# Patient Record
Sex: FEMALE | Race: WHITE | NOT HISPANIC OR LATINO | Employment: FULL TIME | URBAN - METROPOLITAN AREA
[De-identification: names, ages, dates, MRNs, and addresses within clinical notes are randomized per-mention and may not be internally consistent; named-entity substitution may affect disease eponyms.]

---

## 2017-02-20 ENCOUNTER — GENERIC CONVERSION - ENCOUNTER (OUTPATIENT)
Dept: OTHER | Facility: OTHER | Age: 62
End: 2017-02-20

## 2017-02-23 ENCOUNTER — GENERIC CONVERSION - ENCOUNTER (OUTPATIENT)
Dept: OTHER | Facility: OTHER | Age: 62
End: 2017-02-23

## 2017-03-29 ENCOUNTER — ALLSCRIPTS OFFICE VISIT (OUTPATIENT)
Dept: OTHER | Facility: OTHER | Age: 62
End: 2017-03-29

## 2017-04-05 ENCOUNTER — ALLSCRIPTS OFFICE VISIT (OUTPATIENT)
Dept: OTHER | Facility: OTHER | Age: 62
End: 2017-04-05

## 2017-04-05 DIAGNOSIS — R60.0 LOCALIZED EDEMA: ICD-10-CM

## 2017-06-07 ENCOUNTER — HOSPITAL ENCOUNTER (OUTPATIENT)
Dept: RADIOLOGY | Facility: HOSPITAL | Age: 62
Discharge: HOME | End: 2017-06-07
Payer: COMMERCIAL

## 2017-06-07 ENCOUNTER — GENERIC CONVERSION - ENCOUNTER (OUTPATIENT)
Dept: OTHER | Facility: OTHER | Age: 62
End: 2017-06-07

## 2017-06-07 DIAGNOSIS — R60.0 LOCALIZED EDEMA: ICD-10-CM

## 2017-06-07 LAB
A/G RATIO (HISTORICAL): 1.6 (ref 1.2–2.2)
ALBUMIN SERPL BCP-MCNC: 4.3 G/DL (ref 3.5–5.5)
ALP SERPL-CCNC: 69 IU/L (ref 39–117)
AST SERPL W P-5'-P-CCNC: 15 IU/L (ref 0–40)
BILIRUB SERPL-MCNC: 0.8 MG/DL (ref 0–1.2)
BUN SERPL-MCNC: 10 MG/DL (ref 6–24)
BUN/CREA RATIO (HISTORICAL): 16 (ref 9–23)
CALCIUM SERPL-MCNC: 8.9 MG/DL (ref 8.7–10.2)
CHLORIDE SERPL-SCNC: 103 MMOL/L (ref 96–106)
CO2 SERPL-SCNC: 25 MMOL/L (ref 18–29)
CREAT SERPL-MCNC: 0.64 MG/DL (ref 0.57–1)
EGFR AFRICAN AMERICAN (HISTORICAL): 113 ML/MIN/1.73
EGFR-AMERICAN CALC (HISTORICAL): 98 ML/MIN/1.73
GLUCOSE SERPL-MCNC: 109 MG/DL (ref 65–99)
POTASSIUM SERPL-SCNC: 4.7 MMOL/L (ref 3.5–5.2)
SODIUM SERPL-SCNC: 144 MMOL/L (ref 134–144)
TOT. GLOBULIN, SERUM (HISTORICAL): 2.7 G/DL (ref 1.5–4.5)
TOTAL PROTEIN (HISTORICAL): 7 G/DL (ref 6–8.5)

## 2017-06-07 PROCEDURE — 93970 EXTREMITY STUDY: CPT

## 2017-06-08 ENCOUNTER — GENERIC CONVERSION - ENCOUNTER (OUTPATIENT)
Dept: OTHER | Facility: OTHER | Age: 62
End: 2017-06-08

## 2017-06-08 LAB — TSH SERPL DL<=0.05 MIU/L-ACNC: 1.72 UIU/ML (ref 0.45–4.5)

## 2017-06-22 ENCOUNTER — ALLSCRIPTS OFFICE VISIT (OUTPATIENT)
Dept: OTHER | Facility: OTHER | Age: 62
End: 2017-06-22

## 2017-06-22 LAB — HBA1C MFR BLD HPLC: 5.7 %

## 2017-07-06 ENCOUNTER — GENERIC CONVERSION - ENCOUNTER (OUTPATIENT)
Dept: OTHER | Facility: OTHER | Age: 62
End: 2017-07-06

## 2017-07-06 LAB
BASOPHILS # BLD AUTO: 0 X10E3/UL (ref 0–0.2)
BASOPHILS # BLD AUTO: 1 %
BUN SERPL-MCNC: 14 MG/DL (ref 6–24)
BUN/CREA RATIO (HISTORICAL): 21 (ref 9–23)
CHLORIDE SERPL-SCNC: 104 MMOL/L (ref 96–106)
CHOLEST SERPL-MCNC: 130 MG/DL (ref 100–199)
CO2 SERPL-SCNC: 22 MMOL/L (ref 18–29)
CREAT SERPL-MCNC: 0.66 MG/DL (ref 0.57–1)
DEPRECATED RDW RBC AUTO: 13.9 % (ref 12.3–15.4)
EGFR AFRICAN AMERICAN (HISTORICAL): 112 ML/MIN/1.73
EGFR-AMERICAN CALC (HISTORICAL): 97 ML/MIN/1.73
EOSINOPHIL # BLD AUTO: 0.2 X10E3/UL (ref 0–0.4)
EOSINOPHIL # BLD AUTO: 3 %
GLUCOSE SERPL-MCNC: 123 MG/DL (ref 65–99)
HCT VFR BLD AUTO: 39.7 % (ref 34–46.6)
HDLC SERPL-MCNC: 52 MG/DL
HGB BLD-MCNC: 13 G/DL (ref 11.1–15.9)
IMM.GRANULOCYTES (CD4/8) (HISTORICAL): 0 %
IMM.GRANULOCYTES (CD4/8) (HISTORICAL): 0 X10E3/UL (ref 0–0.1)
LDLC SERPL CALC-MCNC: 62 MG/DL (ref 0–99)
LYMPHOCYTES # BLD AUTO: 2.7 X10E3/UL (ref 0.7–3.1)
LYMPHOCYTES # BLD AUTO: 35 %
MCH RBC QN AUTO: 28.3 PG (ref 26.6–33)
MCHC RBC AUTO-ENTMCNC: 32.7 G/DL (ref 31.5–35.7)
MCV RBC AUTO: 87 FL (ref 79–97)
MONOCYTES # BLD AUTO: 0.5 X10E3/UL (ref 0.1–0.9)
MONOCYTES (HISTORICAL): 6 %
NEUTROPHILS # BLD AUTO: 4.2 X10E3/UL (ref 1.4–7)
NEUTROPHILS # BLD AUTO: 55 %
PLATELET # BLD AUTO: 304 X10E3/UL (ref 150–379)
POTASSIUM SERPL-SCNC: 4.5 MMOL/L (ref 3.5–5.2)
RBC (HISTORICAL): 4.59 X10E6/UL (ref 3.77–5.28)
SODIUM SERPL-SCNC: 143 MMOL/L (ref 134–144)
TRIGL SERPL-MCNC: 81 MG/DL (ref 0–149)
VLDLC SERPL CALC-MCNC: 16 MG/DL (ref 5–40)
WBC # BLD AUTO: 7.7 X10E3/UL (ref 3.4–10.8)

## 2017-07-07 LAB
CREATININE, URINE (HISTORICAL): 116 MG/DL
INTERPRETATION (HISTORICAL): NORMAL
MICROALBUM.,U,RANDOM (HISTORICAL): 32.2 UG/ML
MICROALBUMIN/CREATININE RATIO (HISTORICAL): 27.8 MG/G CREAT (ref 0–30)

## 2017-07-08 LAB
EST. AVERAGE GLUCOSE BLD GHB EST-MCNC: 128 MG/DL
HBA1C MFR BLD HPLC: 6.1 %HB

## 2017-07-11 ENCOUNTER — GENERIC CONVERSION - ENCOUNTER (OUTPATIENT)
Dept: OTHER | Facility: OTHER | Age: 62
End: 2017-07-11

## 2017-07-13 ENCOUNTER — ALLSCRIPTS OFFICE VISIT (OUTPATIENT)
Dept: OTHER | Facility: OTHER | Age: 62
End: 2017-07-13

## 2017-07-13 DIAGNOSIS — Z12.31 ENCOUNTER FOR SCREENING MAMMOGRAM FOR MALIGNANT NEOPLASM OF BREAST: ICD-10-CM

## 2017-07-20 ENCOUNTER — HOSPITAL ENCOUNTER (OUTPATIENT)
Dept: RADIOLOGY | Facility: HOSPITAL | Age: 62
Discharge: HOME | End: 2017-07-20
Payer: COMMERCIAL

## 2017-07-20 DIAGNOSIS — Z12.31 ENCOUNTER FOR SCREENING MAMMOGRAM FOR MALIGNANT NEOPLASM OF BREAST: ICD-10-CM

## 2017-07-20 PROCEDURE — G0202 SCR MAMMO BI INCL CAD: HCPCS

## 2017-07-24 ENCOUNTER — GENERIC CONVERSION - ENCOUNTER (OUTPATIENT)
Dept: OTHER | Facility: OTHER | Age: 62
End: 2017-07-24

## 2017-07-31 ENCOUNTER — GENERIC CONVERSION - ENCOUNTER (OUTPATIENT)
Dept: OTHER | Facility: OTHER | Age: 62
End: 2017-07-31

## 2017-08-09 ENCOUNTER — GENERIC CONVERSION - ENCOUNTER (OUTPATIENT)
Dept: OTHER | Facility: OTHER | Age: 62
End: 2017-08-09

## 2017-08-16 ENCOUNTER — ALLSCRIPTS OFFICE VISIT (OUTPATIENT)
Dept: OTHER | Facility: OTHER | Age: 62
End: 2017-08-16

## 2017-09-11 LAB
BUN SERPL-MCNC: 9 MG/DL (ref 6–24)
BUN/CREA RATIO (HISTORICAL): 13 (ref 9–23)
CHLORIDE SERPL-SCNC: 105 MMOL/L (ref 96–106)
CO2 SERPL-SCNC: 25 MMOL/L (ref 18–29)
CREAT SERPL-MCNC: 0.71 MG/DL (ref 0.57–1)
EGFR AFRICAN AMERICAN (HISTORICAL): 108 ML/MIN/1.73
EGFR-AMERICAN CALC (HISTORICAL): 94 ML/MIN/1.73
GLUCOSE SERPL-MCNC: 121 MG/DL (ref 65–99)
POTASSIUM SERPL-SCNC: 4.7 MMOL/L (ref 3.5–5.2)
SODIUM SERPL-SCNC: 145 MMOL/L (ref 134–144)

## 2017-09-12 ENCOUNTER — GENERIC CONVERSION - ENCOUNTER (OUTPATIENT)
Dept: OTHER | Facility: OTHER | Age: 62
End: 2017-09-12

## 2017-09-20 ENCOUNTER — ALLSCRIPTS OFFICE VISIT (OUTPATIENT)
Dept: OTHER | Facility: OTHER | Age: 62
End: 2017-09-20

## 2017-12-26 LAB
A/G RATIO (HISTORICAL): 1.7 (ref 1.2–2.2)
ALBUMIN SERPL BCP-MCNC: 4.3 G/DL (ref 3.6–4.8)
ALP SERPL-CCNC: 80 IU/L (ref 39–117)
AST SERPL W P-5'-P-CCNC: 16 IU/L (ref 0–40)
BILIRUB SERPL-MCNC: 1 MG/DL (ref 0–1.2)
BUN SERPL-MCNC: 11 MG/DL (ref 8–27)
BUN/CREA RATIO (HISTORICAL): 15 (ref 12–28)
CALCIUM SERPL-MCNC: 9.3 MG/DL (ref 8.7–10.3)
CHLORIDE SERPL-SCNC: 101 MMOL/L (ref 96–106)
CO2 SERPL-SCNC: 27 MMOL/L (ref 18–29)
CREAT SERPL-MCNC: 0.73 MG/DL (ref 0.57–1)
EGFR AFRICAN AMERICAN (HISTORICAL): 104 ML/MIN/1.73
EGFR-AMERICAN CALC (HISTORICAL): 90 ML/MIN/1.73
GLUCOSE SERPL-MCNC: 105 MG/DL (ref 65–99)
POTASSIUM SERPL-SCNC: 5 MMOL/L (ref 3.5–5.2)
SODIUM SERPL-SCNC: 142 MMOL/L (ref 134–144)
TOT. GLOBULIN, SERUM (HISTORICAL): 2.6 G/DL (ref 1.5–4.5)
TOTAL PROTEIN (HISTORICAL): 6.9 G/DL (ref 6–8.5)

## 2017-12-27 ENCOUNTER — GENERIC CONVERSION - ENCOUNTER (OUTPATIENT)
Dept: OTHER | Facility: OTHER | Age: 62
End: 2017-12-27

## 2018-01-09 NOTE — RESULT NOTES
Verified Results  COLONOSCOPY 20Jul2016 12:00AM Maribeth Kwan     Test Name Result Flag Reference   Colonoscopy SEE SCANNED IMAGE        Summary / No summary entered :      No summary entered   Documents attached :      Tobias Najera; Enc: [id number] - Image Encounter -      Maribeth Kwan - (Family Medicine) (Result Document)    Plan  Encounter for screening for malignant neoplasm of colon    · COLONOSCOPY ; every 5 years;  Last [id number]; Next 20Jul2021; Status:Active

## 2018-01-10 NOTE — RESULT NOTES
Verified Results  * XR HIP 2+ VIEW RIGHT [id number] 08:11AM Alisa Lacy Order Number: [id number]     Test Name Result Flag Reference   * XR HIP/PELV 2-3 VWS RIGHT (Report)     RIGHT HIP     INDICATION: Right hip pain  COMPARISON: None     VIEWS: AP pelvis and 2 coned down views of the hip; 3 images     FINDINGS:     There is no acute fracture or dislocation  No degenerative changes  No lytic or blastic lesions are seen  Soft tissues are unremarkable  IMPRESSION:     No acute osseous abnormality  Workstation performed: [id number]     Signed by:   Susan Rodriguez MD   5/16/16     * XR SPINE LUMBAR MINIMUM 4 VIEWS [id number] 08:11AM Alisa Lacy Order Number: [id number]     Test Name Result Flag Reference   XR SPINE LUMBAR MINIMUM 4 VIEWS (Report)     LUMBAR SPINE     INDICATION: Lumbar spine radiculopathy  Left sciatic pain  COMPARISON: None     VIEWS: AP, lateral, bilateral oblique and coned down projections; 5 images     FINDINGS:     There is minimal rotatory levoscoliosis  No subluxation  There is no radiographic evidence of acute fracture or destructive osseous lesion  Mild disc degenerative changes at L3-4  Visualized soft tissues appear unremarkable  IMPRESSION:     Mild degenerative changes at L3-4  Minimal rotatory levoscoliosis         Workstation performed: [id number]     Signed by:   Susan Rodriguez MD   5/16/16

## 2018-01-11 NOTE — RESULT NOTES
Discussion/Summary   NOrmal mammogram  [facility]     Verified Results  * MAMMO SCREENING BILATERAL W CAD [id number] 04:40PM Myriam Patiño Order Number: [id number]    - Patient Instructions: To schedule this appointment, please contact Central Scheduling at [phone]  Do not wear any perfume, powder, lotion or deodorant on breast or underarm area  Please bring your doctors order, referral (if needed) and insurance information with you on the day of the test  Failure to bring this information may result in this test being rescheduled  Arrive 15 minutes prior to your appointment time to register  On the day of your test, please bring any prior mammogram or breast studies with you that were not performed at a [facility]  Failure to bring prior exams may result in your test needing to be rescheduled  Test Name Result Flag Reference   MAMMO SCREENING BILATERAL W CAD (Report)     Patient History:   Patient is postmenopausal    Family history of unknown cancer in mother  Patient's BMI is 36 8  Reason for exam: screening, asymptomatic  Screening     Mammo Screening Bilateral W CAD: July 20, 2017 - Check In #:    [de-identified]   Bilateral CC and MLO view(s) were taken  Technologist: Miguel Ashby   Prior study comparison: May 16, 2016, mammo screening bilateral W   CAD, performed at [address]  [facility]  December 8, 2014, left breast diagnostic mammogram performed at    [place] [address]  November 19, 2014, left    breast screening mammogram performed at [place] [address]  September 5, 2013, bilateral screening mammogram    performed at [place] [address]  Lynette 15, 2012,   bilateral screening mammogram performed at [place] [address]  June 11, 2011, bilateral screening mammogram    performed at [place] [address]   January 15,    2010, bilateral screening mammogram performed at [facility]  There are scattered fibroglandular densities  No dominant soft    tissue mass, architectural distortion or suspicious    calcifications are noted  The skin and nipple contours are    within normal limits  No evidence of malignancy  No significant   change when compared to the prior studies  1  No evidence of malignancy  2  No significant change when compared with the prior study  ACR BI-RADSï¾® Assessments: BiRad:1 - Negative     Recommendation:   Routine screening mammogram of both breasts in 1 year  Analyzed by CAD     The patient is scheduled in a reminder system  8-10% of cancers will be missed on mammography  Management of a    palpable abnormality must be based on clinical grounds  Patients   will be notified of their results via letter from our facility  Accredited by [employer] and FDA       Transcription Location: [facility] 98: [id number]     Risk Value(s):   Tyrer-Cuzick 10 Year: 1 300%, Tyrer-Cuzick Lifetime: 3 500%,    Myriad Table: 1 5%, ELIZABETH 5 Year: 1 1%, NCI Lifetime: 6 2%   Signed by:   Yenifer Damon MD   7/20/17

## 2018-01-11 NOTE — RESULT NOTES
Verified Results  ([address] [facility]) Comp   Metabolic Panel (13) [id number] 08:04AM Alexandria Bruner     Test Name Result Flag Reference   Glucose, Serum 109 mg/dL H 65-99   BUN 10 mg/dL  6-24   Creatinine, Serum 0 64 mg/dL  0 57-1 00   BUN/Creatinine Ratio 16  9-23   Sodium, Serum 144 mmol/L  134-144   Potassium, Serum 4 7 mmol/L  3 5-5 2   Chloride, Serum 103 mmol/L     Carbon Dioxide, Total 25 mmol/L  18-29   Calcium, Serum 8 9 mg/dL  8 7-10 2   Protein, Total, Serum 7 0 g/dL  6 0-8 5   Albumin, Serum 4 3 g/dL  3 5-5 5   Globulin, Total 2 7 g/dL  1 5-4 5   A/G Ratio 1 6  1 2-2 2   Bilirubin, Total 0 8 mg/dL  0 0-1 2   Alkaline Phosphatase, S 69 IU/L     AST (SGOT) 15 IU/L  0-40   eGFR If NonAfricn Am 98 mL/min/1 73  >59   eGFR If Africn Am 113 mL/min/1 73  >59     (LC) TSH Rfx on Abnormal to Free T4 [id number] 08:04AM Alexandria Bruner     Test Name Result Flag Reference   TSH 1 720 uIU/mL  0 450-4 500

## 2018-01-12 VITALS
WEIGHT: 212 LBS | RESPIRATION RATE: 12 BRPM | HEART RATE: 76 BPM | SYSTOLIC BLOOD PRESSURE: 140 MMHG | TEMPERATURE: 97.5 F | DIASTOLIC BLOOD PRESSURE: 80 MMHG | HEIGHT: 61 IN | BODY MASS INDEX: 40.02 KG/M2

## 2018-01-12 VITALS
HEART RATE: 88 BPM | BODY MASS INDEX: 40.4 KG/M2 | TEMPERATURE: 97.8 F | HEIGHT: 61 IN | SYSTOLIC BLOOD PRESSURE: 140 MMHG | DIASTOLIC BLOOD PRESSURE: 80 MMHG | OXYGEN SATURATION: 98 % | WEIGHT: 214 LBS | RESPIRATION RATE: 16 BRPM

## 2018-01-12 NOTE — RESULT NOTES
Verified Results  COLONOSCOPY 20Jul2017 12:00AM Diamond Peterson     Test Name Result Flag Reference   Colonoscopy SEE SCANNED IMAGE        Summary / No summary entered :      No summary entered   Documents attached :      Radha Ortega; Enc: [id number] - Image Encounter -      Diamond Peterson - (Family Medicine) (Result Document)    Plan  Encounter for screening for malignant neoplasm of colon    · COLONOSCOPY ; every 5 years;  Last [id number]; Next [id number]; Status:Active

## 2018-01-12 NOTE — RESULT NOTES
Verified Results  ([address] [facility]) Lipid Panel [id number] 07:55AM [employer]     Test Name Result Flag Reference   Cholesterol, Total 130 mg/dL  100-199   Triglycerides 81 mg/dL  0-149   HDL Cholesterol 52 mg/dL  >39   VLDL Cholesterol Neil 16 mg/dL  5-40   LDL Cholesterol Calc 62 mg/dL  0-99     () Hemoglobin A1c [id number] 07:55AM [employer]     Test Name Result Flag Reference   Hemoglobin A1c 6 1 %Hb     Reference Range:  American Diabetes Association (ADA) Guidelines:  <5 7: Decreased risk for diabetes  5 7 - 6 4: Increased risk for diabetes  >6 4: Ongoing Hyperglycemia of any cause  <7 0: Glycemic control for adults with diabetes   Estimated Average Glucose 128 mg/dL       (1) MICROALBUMIN CREATININE RATIO, RANDOM URINE [id number] 07:55AM [employer]     Test Name Result Flag Reference   Creatinine, Urine 116 0 mg/dL  Not Estab  Microalbumin, Urine 32 2 ug/mL  Not Estab  Microalb/Creat Ratio 27 8 mg/g creat  0 0-30 0     () Basic Metabolic Panel (7) [id number] 07:55AM [employer]     Test Name Result Flag Reference   Glucose, Serum 123 mg/dL H 65-99   BUN 14 mg/dL  6-24   Creatinine, Serum 0 66 mg/dL  0 57-1 00   BUN/Creatinine Ratio 21  9-23   Sodium, Serum 143 mmol/L  134-144   Potassium, Serum 4 5 mmol/L  3 5-5 2   Chloride, Serum 104 mmol/L     Carbon Dioxide, Total 22 mmol/L  18-29   eGFR If NonAfricn Am 97 mL/min/1 73  >59   eGFR If Africn Am 112 mL/min/1 73  >59     [facility]) Cardiovascular Risk Assessment [id number] 07:55AM [employer]     Test Name Result Flag Reference   Interpretation Note     -------------------------------  CARDIOVASCULAR REPORT:  -------------------------------  Current available clinical information suggests the  patient's risk is at least LOW  One major CHD risk factor is  present (age over 54)  If the patient has CHD or a CHD risk  equivalent, the risk category is high   If patient does not  have CHD or a CHD risk equivalent, consider use of the  Pooled Cohort Equations to estimate 10-year CVD risk, as  individuals with greater than 7 5% risk may warrant more  intensive therapy  The calculator can be found at:  http://tools  cardiosource org/RDZDK-Hcgs-Ebpjcwhfh/  -  Insulin resistance, obesity, excessive alcohol use, smoking,  liver disease, and certain medications can cause secondary  dyslipidemia  Consider evaluation if clinically indicated  -  Therapeutic lifestyle changes are always valuable to achieve  optimal blood lipid status (diet, exercise, weight  management)  -------------------------------  LIPID MANAGEMENT  Select one patient risk category based upon medical history  and clinical judgment  Additional risk factors such as  personal or family history of premature CHD, smoking, and  hypertension modify a patient's goals of therapy  In CVD  prevention, the intensity of therapy should be adjusted to  the level of patient risk  MODERATE intensity statin therapy  generally results in an average LDL-C reduction of 30% to  less than 50% from the untreated baseline  Examples include  (daily doses): atorvastatin 10-20 mg, rosuvastatin 5-10 mg,  simvastatin 20-40 mg, pravastatin 40-80 mg, lovastatin 40  mg  HIGH intensity statin therapy generally results in an  average LDL-C reduction of 50% or more from the untreated  baseline  Examples include (daily doses): atorvastatin 40-80  mg and rosuvastatin 20 mg   -------------------------------  LOW RISK ASSESSMENT AND TREATMENT SUGGESTIONS  -------------------------------  LDL-C is optimal, was 111 and now is 62 mg/dL  Non-HDL  Cholesterol is optimal, was 131 and now is 78 mg/dL    -  Considerations for use of statin therapy include family  history of premature atherosclerotic disease, elevated  coronary artery calcium score, ankle-brachial index < 0 9,  elevated CRP, or elevated 10-year or lifetime CVD risk   -------------------------------  INTERMEDIATE RISK ASSESSMENT AND TREATMENT SUGGESTIONS  -------------------------------  LDL-C is optimal, was 111 and now is 62 mg/dL  Non-HDL  Cholesterol is optimal, was 131 and now is 78 mg/dL  -  Consider measurement of LDL particle number or Apo B to  adjudicate need for further LDL lowering therapy  Factors  that may influence statin use include family history of  premature atherosclerotic disease, elevated coronary artery  calcium score, ankle-brachial index < 0 9, elevated CRP, or  elevated 10-year or lifetime CVD risk  If statin cannot be  tolerated or increased, alternatives include use of an  intestinal agent (ezetimibe or bile acid sequestrant) or  niacin   -------------------------------  HIGH RISK ASSESSMENT AND TREATMENT SUGGESTIONS  -------------------------------  LDL-C is optimal, was 111 and now is 62 mg/dL  Non-HDL  Cholesterol is optimal, was 131 and now is 78 mg/dL  -  Continue statin if in use  Consider measurement of LDL  particle number or Apo B to adjudicate need for further LDL  lowering therapy  If statin cannot be tolerated or  increased, alternatives include use of an intestinal agent  (ezetimibe or bile acid sequestrant) or niacin   -------------------------------  DISCLAIMER  These assessments and treatment suggestions are provided as  a convenience in support of the physician-patient  relationship and are not intended to replace the physician's  clinical judgment  They are derived from the national  guidelines in addition to other evidence and expert opinion  The clinician should consider this information within the  context of clinical opinion and the individual patient  SEE GUIDANCE FOR CARDIOVASCULAR REPORT: National Heart,  Lung, and Blood [place]'s Third Report of the NCEP Expert  Panel on Detection, Evaluation and Treatment of High Blood  Cholesterol in Adults (ATP III) (2002  NIH publication  );  Alisson et al  Diabetes Care 2008; 31(4):811-82;  Yobani et al  Clin Chem 2009; 55(3):407-419; Lissy Oropeza et  al  2013 ACC/AHA guideline on the treatment of blood  cholesterol to reduce atherosclerotic cardiovascular risk in  adults: a report of the [employer] of  Cardiology/American Heart Association Task Force on Practice  Guidelines  Circulation 9663;330(DVDJI 2):S1? S45  PDF Image

## 2018-01-12 NOTE — RESULT NOTES
Verified Results  [facility]) Basic Metabolic Panel (7) [id number] 06:39AM Jose Rosas     Test Name Result Flag Reference   Glucose, Serum 121 mg/dL H 65-99   BUN 9 mg/dL  6-24   Creatinine, Serum 0 71 mg/dL  0 57-1 00   BUN/Creatinine Ratio 13  9-23   Sodium, Serum 145 mmol/L H 134-144   Potassium, Serum 4 7 mmol/L  3 5-5 2   Chloride, Serum 105 mmol/L     Carbon Dioxide, Total 25 mmol/L  18-29   eGFR If NonAfricn Am 94 mL/min/1 73  >59   eGFR If Africn Am 108 mL/min/1 73  >59

## 2018-01-13 VITALS
BODY MASS INDEX: 40.22 KG/M2 | SYSTOLIC BLOOD PRESSURE: 160 MMHG | HEIGHT: 61 IN | RESPIRATION RATE: 16 BRPM | DIASTOLIC BLOOD PRESSURE: 90 MMHG | WEIGHT: 213 LBS | TEMPERATURE: 97.6 F | HEART RATE: 88 BPM

## 2018-01-13 VITALS
WEIGHT: 208 LBS | RESPIRATION RATE: 14 BRPM | SYSTOLIC BLOOD PRESSURE: 120 MMHG | HEIGHT: 61 IN | HEART RATE: 84 BPM | BODY MASS INDEX: 39.27 KG/M2 | DIASTOLIC BLOOD PRESSURE: 80 MMHG | TEMPERATURE: 98.1 F

## 2018-01-14 VITALS
HEIGHT: 61 IN | WEIGHT: 209 LBS | SYSTOLIC BLOOD PRESSURE: 112 MMHG | TEMPERATURE: 97.2 F | HEART RATE: 64 BPM | BODY MASS INDEX: 39.46 KG/M2 | RESPIRATION RATE: 12 BRPM | DIASTOLIC BLOOD PRESSURE: 70 MMHG

## 2018-01-14 NOTE — RESULT NOTES
Discussion/Summary   No DVT/deep vein thrombosis/clots on doppler study  [facility]     Verified Results  VAS LOWER LIMB VENOUS DUPLEX STUDY, COMPLETE BILATERAL 07Jun2017 01:57PM Lily Ho Order Number: [id number]    - Patient Instructions: To schedule this appointment, please contact Central Scheduling at [phone]  Test Name Result Flag Reference   VAS LOWER LIMB VENOUS DUPLEX STUDY, COMPLETE BILATERAL (Report)     [facility] REPORT   CLINICAL:   Indications: Localized edema [R60 0]  Patient presents with bilateral lower extremity edema and pain, left > right x   2 months  Patient has Lupus   The patient has no history of CAD, DVT or malignancy  The patient current BMI   is 39 67, Weight is 210 lb and Height is 61 in  FINDINGS:      Segment Right      Left          Impression    Impression       CFV   Normal (Patent) Normal (Patent)             CONCLUSION:   Impression:   RIGHT LOWER LIMB:   No evidence of acute or chronic deep vein thrombosis  No evidence of superficial thrombophlebitis noted  Doppler evaluation shows a normal response to distal augmentation maneuvers  Popliteal, posterior tibial and anterior tibial arterial Doppler waveforms are   triphasic      LEFT LOWER LIMB:   No evidence of acute or chronic deep vein thrombosis  No evidence of superficial thrombophlebitis noted  Doppler evaluation shows a normal response to distal augmentation maneuvers  Popliteal, posterior tibial and anterior tibial arterial Doppler waveforms are   triphasic      Pulsatile waveforms in the venous system may suggest heart failure or tricuspid   valve insufficiency        SIGNATURE:   Electronically Signed by: Lesly Weaver MD, RPVI on 2017-06-07 04:50:06 PM

## 2018-01-15 VITALS
WEIGHT: 215 LBS | SYSTOLIC BLOOD PRESSURE: 140 MMHG | BODY MASS INDEX: 40.59 KG/M2 | TEMPERATURE: 97.8 F | HEIGHT: 61 IN | DIASTOLIC BLOOD PRESSURE: 90 MMHG | RESPIRATION RATE: 16 BRPM | HEART RATE: 86 BPM

## 2018-01-15 NOTE — RESULT NOTES
Verified Results  * MAMMO SCREENING BILATERAL W CAD [id number] 08:33AM Bautista Porter Order Number: [id number]     Test Name Result Flag Reference   MAMMO SCREENING BILATERAL W CAD (Report)     Patient History:   Patient is postmenopausal    Patient's BMI is 36 8  Reason for exam: screening (asymptomatic)  Mammo Screening Bilateral W CAD: May 16, 2016 - Check In #:    [de-identified]   Bilateral MLO, CC, and XCCL view(s) were taken  Technologist: RT Kiet(R)(M)   Prior study comparison: December 8, 2014, left breast diagnostic    mammogram, performed at [place] [address]  November 19, 2014, left breast screening mammogram, performed at    [place] [address]  There are scattered fibroglandular densities  No dominant soft tissue mass, architectural distortion or    suspicious calcifications are noted  The skin and nipple    contours are within normal limits  Left breast focal asymmetry is unchanged in size for at least 3    years suggesting benign etiology  No evidence of malignancy  No significant changes   when compared with prior studies  ASSESSMENT: BiRad:1 - Negative     Recommendation:   Routine screening mammogram of both breasts in 1 year  A    reminder letter will be scheduled  Analyzed by CAD     8-10% of cancers will be missed on mammography  Management of a    palpable abnormality must be based on clinical grounds  Patients   will be notified of their results via letter from our facility  Accredited by [employer] and FDA       Transcription Location:  [place] 98: [id number]     Risk Value(s):   Tyrer-Cuzick 10 Year: 1 292%, Tyrer-Cuzick Lifetime: 3 600%,    Myriad Table: 1 5%, ELIZABETH 5 Year: 1 1%, NCI Lifetime: 6 3%   Signed by:   Esdras Umana MD   7/12/16

## 2018-01-16 NOTE — RESULT NOTES
Verified Results  * MAMMO SCREENING BILATERAL W CAD [id number] 08:33AM Angela Juarez Order Number: [id number]     Test Name Result Flag Reference   MAMMO SCREENING BILATERAL W CAD (Report)     Patient History:   Patient is postmenopausal    Patient's BMI is 36 8  Reason for exam: screening (asymptomatic)  Mammo Screening Bilateral W CAD: May 16, 2016 - Check In #:    [de-identified]   Bilateral MLO, CC, and XCCL view(s) were taken  Technologist: Haily Mcdermott RT(R)(M)   Prior study comparison: December 8, 2014, left breast diagnostic    mammogram performed at [place] [address]  November 19, 2014, left breast screening mammogram performed at    [place] [address]  There are scattered fibroglandular densities  No dominant soft tissue mass, architectural distortion or    suspicious calcifications are noted  The skin and nipple    contours are within normal limits  Left breast focal asymmetry is unchanged in size for at least 3    years suggesting benign etiology  No evidence of malignancy  No significant changes   when compared with prior studies  ASSESSMENT: BiRad:1 - Negative     Recommendation:   Routine screening mammogram of both breasts in 1 year  A    reminder letter will be scheduled  Analyzed by CAD     8-10% of cancers will be missed on mammography  Management of a    palpable abnormality must be based on clinical grounds  Patients   will be notified of their results via letter from our facility  Accredited by [employer] and FDA       Transcription Location:  [place] 98: [id number]     Risk Value(s):   Tyrer-Cuzick 10 Year: 1 292%, Tyrer-Cuzick Lifetime: 3 600%,    Myriad Table: 1 5%, ELIZABETH 5 Year: 1 1%, NCI Lifetime: 6 3%   Signed by:   Shane Tinoco MD   5/16/16

## 2018-01-17 NOTE — RESULT NOTES
Discussion/Summary   CBC normal  KH     Verified Results  (1) CBC/PLT/DIFF [id number] 07:55AM Megan Portillo     Test Name Result Flag Reference   WBC 7 7 x10E3/uL  3 4-10 8   RBC 4 59 x10E6/uL  3 77-5 28   Hemoglobin 13 0 g/dL  11 1-15 9   Hematocrit 39 7 %  34 0-46  6   MCV 87 fL  79-97   MCH 28 3 pg  26 6-33 0   MCHC 32 7 g/dL  31 5-35 7   RDW 13 9 %  12 3-15 4   Platelets 778 C88X0/AY  150-379   Neutrophils 55 %     Lymphs 35 %     Monocytes 6 %     Eos 3 %     Basos 1 %     Neutrophils (Absolute) 4 2 x10E3/uL  1 4-7 0   Lymphs (Absolute) 2 7 x10E3/uL  0 7-3 1   Monocytes(Absolute) 0 5 x10E3/uL  0 1-0 9   Eos (Absolute) 0 2 x10E3/uL  0 0-0 4   Baso (Absolute) 0 0 x10E3/uL  0 0-0 2   Immature Granulocytes 0 %     Immature Grans (Abs) 0 0 x10E3/uL  0 0-0 1

## 2018-01-17 NOTE — RESULT NOTES
Message   pap normal, notify pt   Verified Results  ([address] [facility]) Pap IG, rfx HPV all pth 09Aug2016 12:00AM Jadon Dumont   [id number]  No  of containers  [place] 01 CYTYC Thin Prep Vial     Test Name Result Flag Reference   DIAGNOSIS: Comment     NEGATIVE FOR INTRAEPITHELIAL LESION AND MALIGNANCY  Specimen adequacy: Comment     Satisfactory for evaluation  Endocervical component may not be  distinguished in cases of atrophy  Clinician provided ICD10: Comment     Z01 [id number]   Performed by: Rajat Polk Cytotechnologist (ASCP)     [facility] Note: Comment     The Pap smear is a screening test designed to aid in the detection of  premalignant and malignant conditions of the uterine cervix  It is not a  diagnostic procedure and should not be used as the sole means of detecting  cervical cancer  Both false-positive and false-negative reports do occur  Test Methodology: Comment     This liquid based ThinPrep(R) pap test was screened with the  use of an image guided system    Comment     The HPV DNA reflex criteria were not met with this specimen result  therefore, no HPV testing was performed  Plan  Encounter for gynecological examination without abnormal finding    · (LC) Pap IG, rfx HPV all pth ; every 1 year;  Last 09Aug2016; Next 09Aug2017;  Status:Active

## 2018-01-23 NOTE — RESULT NOTES
Verified Results  ([address] [facility]) Comp   Metabolic Panel (13) [id number] 09:49AM [place]     Test Name Result Flag Reference   Glucose, Serum 105 mg/dL H 65-99   BUN 11 mg/dL  8-27   Creatinine, Serum 0 73 mg/dL  0 57-1 00   BUN/Creatinine Ratio 15  12-28   Sodium, Serum 142 mmol/L  134-144   Potassium, Serum 5 0 mmol/L  3 5-5 2   Chloride, Serum 101 mmol/L     Carbon Dioxide, Total 27 mmol/L  18-29   Calcium, Serum 9 3 mg/dL  8 7-10 3   Protein, Total, Serum 6 9 g/dL  6 0-8 5   Albumin, Serum 4 3 g/dL  3 6-4 8   Globulin, Total 2 6 g/dL  1 5-4 5   A/G Ratio 1 7  1 2-2 2   Bilirubin, Total 1 0 mg/dL  0 0-1 2   Alkaline Phosphatase, S 80 IU/L     AST (SGOT) 16 IU/L  0-40   eGFR If NonAfricn Am 90 mL/min/1 73  >59   eGFR If Africn Am 104 mL/min/1 73  >59

## 2018-02-13 PROBLEM — R60.0 EDEMA EXTREMITIES: Status: ACTIVE | Noted: 2017-04-05

## 2018-02-16 ENCOUNTER — OFFICE VISIT (OUTPATIENT)
Dept: FAMILY MEDICINE CLINIC | Facility: CLINIC | Age: 63
End: 2018-02-16
Payer: COMMERCIAL

## 2018-02-16 VITALS
SYSTOLIC BLOOD PRESSURE: 140 MMHG | DIASTOLIC BLOOD PRESSURE: 80 MMHG | WEIGHT: 202 LBS | HEART RATE: 96 BPM | TEMPERATURE: 98.2 F | HEIGHT: 61 IN | BODY MASS INDEX: 38.14 KG/M2

## 2018-02-16 DIAGNOSIS — I10 BENIGN ESSENTIAL HYPERTENSION: ICD-10-CM

## 2018-02-16 DIAGNOSIS — B07.0 PLANTAR WART: Primary | ICD-10-CM

## 2018-02-16 DIAGNOSIS — R73.01 IMPAIRED FASTING GLUCOSE: ICD-10-CM

## 2018-02-16 DIAGNOSIS — E78.5 HYPERLIPIDEMIA, UNSPECIFIED HYPERLIPIDEMIA TYPE: ICD-10-CM

## 2018-02-16 PROCEDURE — 99213 OFFICE O/P EST LOW 20 MIN: CPT | Performed by: NURSE PRACTITIONER

## 2018-02-16 RX ORDER — LISINOPRIL 10 MG/1
TABLET ORAL
COMMUNITY
Start: 2017-06-22 | End: 2018-02-16 | Stop reason: SDUPTHER

## 2018-02-16 RX ORDER — LISINOPRIL 10 MG/1
10 TABLET ORAL DAILY
Qty: 90 TABLET | Refills: 1 | Status: SHIPPED | OUTPATIENT
Start: 2018-02-16 | End: 2018-03-29 | Stop reason: DRUGHIGH

## 2018-02-16 RX ORDER — EZETIMIBE 10 MG/1
TABLET ORAL DAILY
COMMUNITY
Start: 2016-08-24 | End: 2018-02-16 | Stop reason: SDUPTHER

## 2018-02-16 RX ORDER — EZETIMIBE 10 MG/1
10 TABLET ORAL DAILY
Qty: 90 TABLET | Refills: 1 | Status: SHIPPED | OUTPATIENT
Start: 2018-02-16 | End: 2018-08-20 | Stop reason: SDUPTHER

## 2018-02-16 RX ORDER — GLUCOSAMINE HCL 500 MG
1000 TABLET ORAL 2 TIMES DAILY
COMMUNITY
Start: 2014-11-05

## 2018-02-16 RX ORDER — AMPICILLIN TRIHYDRATE 250 MG
1 CAPSULE ORAL DAILY
COMMUNITY
Start: 2016-08-24

## 2018-02-16 RX ORDER — ASPIRIN 81 MG/1
81 TABLET ORAL DAILY
COMMUNITY

## 2018-02-16 RX ORDER — AMOXICILLIN 500 MG
2 CAPSULE ORAL 2 TIMES DAILY
COMMUNITY
Start: 2016-08-24 | End: 2022-02-23

## 2018-02-16 RX ORDER — ATORVASTATIN CALCIUM 20 MG/1
TABLET, FILM COATED ORAL
COMMUNITY
Start: 2016-08-24 | End: 2018-02-16 | Stop reason: SDUPTHER

## 2018-02-16 RX ORDER — ATORVASTATIN CALCIUM 20 MG/1
20 TABLET, FILM COATED ORAL DAILY
Qty: 90 TABLET | Refills: 1 | Status: SHIPPED | OUTPATIENT
Start: 2018-02-16 | End: 2018-02-19 | Stop reason: SDUPTHER

## 2018-02-16 NOTE — PROGRESS NOTES
Chief Complaint   Patient presents with    Follow-up     review labs        Patient ID: Vandana Rae is a 63 y o  female  HTN:Pt presents for follow up hypertension  Reports medication compliance with meds as listed in medication list  Pt denies cardiovascular sx of concern or new events at the time of the visit or since the previous visit  BP readings outside the office as reported by the patient are normotensive/at goal      New complaint over the past month patient reports pain in the left heel  She states the pain is worse when walking with vertical load  She has not treated this with anything at home  She does not identify any alleviating factors  The pattern is worse since onset  She also notes some sores in area where the pain is  Past Medical History:   Diagnosis Date    Arthropathy     ONSET: [id number]    Cellulitis     ONSET: 25RKS2491    Costochondritis     ONSET: 13THX0946    Dermatitis     ONSET: 69EGZ2337    Hemorrhagic detachment of retinal pigment epithelium     ONSET: 53DJO7368    Hypertension     LAST ASSESSED: 45UHH1471    Ingrown nail     LAST ASSESSED: 31NXL0824    Labyrinthitis     ONSET: 31NLA4041    Lymphadenopathy     ONSET: 59KFC7262    Myalgia     ONSET: 16CJG1455    Myositis     ONSET: 60JMP2656    Nonallopathic lesion     ONSET: 13EKV9095    Shortness of breath     ONSET: 52GOD8204    Systemic lupus erythematosus (HCC)     LAST ASSESSED: 74XIU5927    Tinea corporis     ONSET: 05POO9241    Vertigo     LAST ASSESSED: 25IIK4075       History reviewed  No pertinent surgical history      Patient Active Problem List   Diagnosis    Benign essential hypertension    Edema extremities    Hyperlipidemia    Impaired fasting glucose    MTHFR mutation (HCC)    Osteopenia    Systemic lupus erythematosus (HCC)    Thrombocytosis (HCC)    Vitamin D deficiency    Plantar wart       Family History   Problem Relation Age of Onset    Stomach cancer Mother MALIGNANT NEOPLASM    Hypertension Father     Coronary artery disease Sister     Coronary artery disease Brother     Other Brother      CARDIAC PACEMAKER, CARDIOMEGALY       Immunization History   Administered Date(s) Administered    Influenza Quadrivalent Preservative Free 3 years and older IM 09/25/2014    Influenza TIV (IM) 09/05/2009, 09/23/2010, 10/01/2011, 09/26/2013, 09/20/2017    Td (adult), adsorbed 07/14/1997    Tdap 05/26/2016       Allergies   Allergen Reactions    Other      Reaction Date: [id number]; Annotation - [id number]: rash   madlpn adhesive tape    Penicillins Rash     Reaction Date: [id number]; Annotation - [id number]: jstaceyw       Current Outpatient Prescriptions   Medication Sig Dispense Refill    aspirin (ECOTRIN LOW STRENGTH) 81 mg EC tablet Take 81 mg by mouth daily      atorvastatin (LIPITOR) 20 mg tablet Take by mouth      Cholecalciferol (VITAMIN D3) 3000 units TABS Take 1,000 Units by mouth 2 (two) times a day        Coenzyme Q10 (COQ10) 200 MG CAPS Take 1 capsule by mouth daily      ezetimibe (ZETIA) 10 mg tablet Take by mouth daily      lisinopril (ZESTRIL) 10 mg tablet Take by mouth      metFORMIN (GLUCOPHAGE) 500 mg tablet Take by mouth 2 (two) times a day      Omega-3 Fatty Acids (FISH OIL) 1200 MG CAPS Take 2 capsules by mouth 2 (two) times a day       No current facility-administered medications for this visit  Social History     Social History    Marital status: Single     Spouse name: N/A    Number of children: N/A    Years of education: N/A     Social History Main Topics    Smoking status: Never Smoker    Smokeless tobacco: Never Used    Alcohol use Yes      Comment: rare    Drug use: No    Sexual activity: Not Asked     Other Topics Concern    None     Social History Narrative    DAILY COLA CONSUMPTION (1 CANS/DAY)       Review of Systems   Constitutional: Negative  HENT: Negative  Eyes: Negative  Respiratory: Negative      Cardiovascular: Negative  Gastrointestinal: Negative  Endocrine: Negative  Genitourinary: Negative  Musculoskeletal: Positive for arthralgias  Skin: Negative  Allergic/Immunologic: Negative  Neurological: Negative  Hematological: Negative  Psychiatric/Behavioral: Negative  Objective:    /80 (BP Location: Left arm, Patient Position: Sitting, Cuff Size: Adult)   Pulse 96   Temp 98 2 °F (36 8 °C) (Temporal)   Ht 5' 1" (1 549 m)   Wt 91 6 kg (202 lb)   BMI 38 17 kg/m²        Physical Exam   Constitutional: She is oriented to person, place, and time  She appears well-developed and well-nourished  No distress  HENT:   Head: Normocephalic and atraumatic  Right Ear: External ear normal    Left Ear: External ear normal    Eyes: Conjunctivae are normal  No scleral icterus  Neck: Neck supple  No JVD present  Cardiovascular: Normal rate, regular rhythm and normal heart sounds  Pulmonary/Chest: Effort normal and breath sounds normal    Abdominal: Soft  There is no tenderness  Musculoskeletal: She exhibits no edema  Left heel tenderness with a very large cluster of multiple plantar warts  Neurological: She is alert and oriented to person, place, and time  Skin: Skin is warm and dry  Psychiatric: She has a normal mood and affect  Assessment/Plan:    No problem-specific Assessment & Plan notes found for this encounter  Diagnoses and all orders for this visit:    Plantar wart  Comments:  Self schedule with Dr Lisandro Gongora for further evaluation and management  Orders:  -     Ambulatory referral to Podiatry; Future    Benign essential hypertension  Comments:  Mild elevation in blood pressure today  This is out of character  Return to the office in one month for blood pressure check after lifestyle modification in p    Impaired fasting glucose    Other orders  -     atorvastatin (LIPITOR) 20 mg tablet; Take by mouth  -     Coenzyme Q10 (COQ10) 200 MG CAPS;  Take 1 capsule by mouth daily  -     ezetimibe (ZETIA) 10 mg tablet; Take by mouth daily  -     Omega-3 Fatty Acids (FISH OIL) 1200 MG CAPS; Take 2 capsules by mouth 2 (two) times a day  -     lisinopril (ZESTRIL) 10 mg tablet; Take by mouth  -     metFORMIN (GLUCOPHAGE) 500 mg tablet; Take by mouth 2 (two) times a day  -     Cholecalciferol (VITAMIN D3) 3000 units TABS; Take 1,000 Units by mouth 2 (two) times a day    -     aspirin (ECOTRIN LOW STRENGTH) 81 mg EC tablet; Take 81 mg by mouth daily            Patient Instructions   Return in one month for blood pressure check  I am hopeful she will not need an increase in blood pressure medication once her lifestyle modification is in place  Begin Paleo diet and the Wh[employer] protocol lifestyle template  Work toward the ketogenic template this will also help her impaired fasting glucose  Continue to monitor glucose levels as lifestyle modification is implemented  Schedule with Dr  group so to have a planter's wart is a dressed                      Lockie Edgard

## 2018-02-16 NOTE — PATIENT INSTRUCTIONS
Return in one month for blood pressure check  I am hopeful she will not need an increase in blood pressure medication once her lifestyle modification is in place  Begin Paleo diet and the Wh[employer] protocol lifestyle template  Work toward the ketogenic template this will also help her impaired fasting glucose  Continue to monitor glucose levels as lifestyle modification is implemented  Schedule with Dr  group so to have a planter's wart is a dressed

## 2018-02-19 DIAGNOSIS — E78.5 HYPERLIPIDEMIA, UNSPECIFIED HYPERLIPIDEMIA TYPE: ICD-10-CM

## 2018-02-19 RX ORDER — ATORVASTATIN CALCIUM 20 MG/1
20 TABLET, FILM COATED ORAL
Qty: 90 TABLET | Refills: 1 | Status: SHIPPED | OUTPATIENT
Start: 2018-02-19 | End: 2018-08-15 | Stop reason: SDUPTHER

## 2018-03-29 ENCOUNTER — OFFICE VISIT (OUTPATIENT)
Dept: FAMILY MEDICINE CLINIC | Facility: CLINIC | Age: 63
End: 2018-03-29
Payer: COMMERCIAL

## 2018-03-29 VITALS
HEIGHT: 61 IN | HEART RATE: 92 BPM | BODY MASS INDEX: 38.33 KG/M2 | WEIGHT: 203 LBS | SYSTOLIC BLOOD PRESSURE: 140 MMHG | TEMPERATURE: 97.4 F | RESPIRATION RATE: 16 BRPM | DIASTOLIC BLOOD PRESSURE: 80 MMHG

## 2018-03-29 DIAGNOSIS — I10 BENIGN ESSENTIAL HYPERTENSION: ICD-10-CM

## 2018-03-29 PROCEDURE — 99213 OFFICE O/P EST LOW 20 MIN: CPT | Performed by: NURSE PRACTITIONER

## 2018-03-29 RX ORDER — LISINOPRIL 20 MG/1
20 TABLET ORAL DAILY
Qty: 30 TABLET | Refills: 3 | Status: SHIPPED | OUTPATIENT
Start: 2018-03-29 | End: 2018-05-25 | Stop reason: SDUPTHER

## 2018-03-29 NOTE — PROGRESS NOTES
Chief Complaint   Patient presents with    Blood Pressure Check        Patient ID: Jim Galloway is a 63 y o  female  HTN: Pt presents for follow up hypertension  Reports medication compliance with meds as listed in medication list  Pt denies cardiovascular sx of concern or new events at the time of the visit or since the previous visit  BP readings outside the office as reported by the patient are at or near stage I htn  BP readings today and at the last office visit were stage I hypertension range  Past Medical History:   Diagnosis Date    Arthropathy     ONSET: [id number]    Cellulitis     ONSET: 02UWC6667    Costochondritis     ONSET: 63PPH9693    Dermatitis     ONSET: 22QJI9224    Hemorrhagic detachment of retinal pigment epithelium     ONSET: 53WYS7458    Hypertension     LAST ASSESSED: 95VDM1728    Ingrown nail     LAST ASSESSED: 23UGW3720    Labyrinthitis     ONSET: 79HID7755    Lymphadenopathy     ONSET: 08FCQ2102    Myalgia     ONSET: 17GYN1633    Myositis     ONSET: 57UID6398    Nonallopathic lesion     ONSET: 79DIY3703    Shortness of breath     ONSET: 96MSX0479    Systemic lupus erythematosus (HCC)     LAST ASSESSED: 08TEA7302    Tinea corporis     ONSET: 33GBK1621    Vertigo     LAST ASSESSED: 17EEF7716       History reviewed  No pertinent surgical history      Patient Active Problem List   Diagnosis    Benign essential hypertension    Edema extremities    Hyperlipidemia    Impaired fasting glucose    MTHFR mutation (HCC)    Osteopenia    Systemic lupus erythematosus (HCC)    Thrombocytosis (HCC)    Vitamin D deficiency    Plantar wart       Family History   Problem Relation Age of Onset    Stomach cancer Mother      MALIGNANT NEOPLASM    Hypertension Father     Coronary artery disease Sister     Coronary artery disease Brother     Other Brother      CARDIAC PACEMAKER, CARDIOMEGALY       Immunization History   Administered Date(s) Administered    Influenza Quadrivalent Preservative Free 3 years and older IM 09/25/2014    Influenza TIV (IM) 09/05/2009, 09/23/2010, 10/01/2011, 09/26/2013, 09/20/2017    Td (adult), adsorbed 07/14/1997    Tdap 05/26/2016       Allergies   Allergen Reactions    Other      Reaction Date: [id number]; Annotation - [id number]: rash   madlpn adhesive tape    Penicillins Rash     Reaction Date: [id number]; Annotation - [id number]: jjw       Current Outpatient Prescriptions   Medication Sig Dispense Refill    aspirin (ECOTRIN LOW STRENGTH) 81 mg EC tablet Take 81 mg by mouth daily      atorvastatin (LIPITOR) 20 mg tablet Take 1 tablet (20 mg total) by mouth daily at bedtime for 90 days 90 tablet 1    Cholecalciferol (VITAMIN D3) 3000 units TABS Take 1,000 Units by mouth 2 (two) times a day        Coenzyme Q10 (COQ10) 200 MG CAPS Take 1 capsule by mouth daily      ezetimibe (ZETIA) 10 mg tablet Take 1 tablet (10 mg total) by mouth daily 90 tablet 1    metFORMIN (GLUCOPHAGE) 500 mg tablet Take 1 tablet (500 mg total) by mouth 2 (two) times a day 180 tablet 1    Omega-3 Fatty Acids (FISH OIL) 1200 MG CAPS Take 2 capsules by mouth 2 (two) times a day      lisinopril (ZESTRIL) 20 mg tablet Take 1 tablet (20 mg total) by mouth daily 30 tablet 3     No current facility-administered medications for this visit  Social History     Social History    Marital status: Single     Spouse name: N/A    Number of children: N/A    Years of education: N/A     Social History Main Topics    Smoking status: Never Smoker    Smokeless tobacco: Never Used    Alcohol use Yes      Comment: rare    Drug use: No    Sexual activity: Not Asked     Other Topics Concern    None     Social History Narrative    DAILY COLA CONSUMPTION (1 CANS/DAY)       Review of Systems   Constitutional: Negative  HENT: Negative  Eyes: Negative  Respiratory: Negative  Cardiovascular: Negative  Gastrointestinal: Negative  Endocrine: Negative  Genitourinary: Negative  Musculoskeletal: Negative  Skin: Negative  Allergic/Immunologic: Negative  Neurological: Negative  Hematological: Negative  Psychiatric/Behavioral: Negative  Objective:    /80 (BP Location: Left arm, Patient Position: Sitting, Cuff Size: Large)   Pulse 92   Temp (!) 97 4 °F (36 3 °C) (Tympanic)   Resp 16   Ht 5' 1" (1 549 m)   Wt 92 1 kg (203 lb)   BMI 38 36 kg/m²        Physical Exam   Constitutional: She is oriented to person, place, and time  She appears well-developed and well-nourished  No distress  HENT:   Head: Normocephalic  Right Ear: External ear normal    Left Ear: External ear normal    Eyes: Conjunctivae are normal  No scleral icterus  Neck: No JVD present  Cardiovascular: Normal rate, regular rhythm and normal heart sounds  Pulmonary/Chest: Effort normal and breath sounds normal    Musculoskeletal: She exhibits no edema  Neurological: She is alert and oriented to person, place, and time  Skin: Skin is warm and dry  Psychiatric: She has a normal mood and affect  Assessment/Plan:    No problem-specific Assessment & Plan notes found for this encounter  Diagnoses and all orders for this visit:    Benign essential hypertension  Comments:  Mild elevation in blood pressure today  This is out of character  Return to the office in one month for blood pressure check after lifestyle modification in p  Orders:  -     lisinopril (ZESTRIL) 20 mg tablet; Take 1 tablet (20 mg total) by mouth daily            There are no Patient Instructions on file for this visit                    Rod Redmond

## 2018-05-25 DIAGNOSIS — I10 BENIGN ESSENTIAL HYPERTENSION: ICD-10-CM

## 2018-05-25 RX ORDER — LISINOPRIL 20 MG/1
20 TABLET ORAL DAILY
Qty: 30 TABLET | Refills: 0 | Status: SHIPPED | OUTPATIENT
Start: 2018-05-25 | End: 2018-05-30 | Stop reason: SDUPTHER

## 2018-05-30 DIAGNOSIS — I10 BENIGN ESSENTIAL HYPERTENSION: ICD-10-CM

## 2018-05-30 RX ORDER — LISINOPRIL 20 MG/1
20 TABLET ORAL DAILY
Qty: 90 TABLET | Refills: 0 | Status: SHIPPED | OUTPATIENT
Start: 2018-05-30 | End: 2018-08-29 | Stop reason: SDUPTHER

## 2018-06-13 ENCOUNTER — OFFICE VISIT (OUTPATIENT)
Dept: FAMILY MEDICINE CLINIC | Facility: CLINIC | Age: 63
End: 2018-06-13
Payer: COMMERCIAL

## 2018-06-13 VITALS
HEART RATE: 76 BPM | HEIGHT: 61 IN | DIASTOLIC BLOOD PRESSURE: 72 MMHG | BODY MASS INDEX: 38.21 KG/M2 | SYSTOLIC BLOOD PRESSURE: 132 MMHG | WEIGHT: 202.4 LBS | RESPIRATION RATE: 14 BRPM

## 2018-06-13 DIAGNOSIS — E78.5 HYPERLIPIDEMIA, UNSPECIFIED HYPERLIPIDEMIA TYPE: ICD-10-CM

## 2018-06-13 DIAGNOSIS — E55.9 VITAMIN D DEFICIENCY: ICD-10-CM

## 2018-06-13 DIAGNOSIS — R73.01 IMPAIRED FASTING GLUCOSE: ICD-10-CM

## 2018-06-13 DIAGNOSIS — Z15.89 MTHFR MUTATION: ICD-10-CM

## 2018-06-13 DIAGNOSIS — Z13.820 ENCOUNTER FOR SCREENING FOR OSTEOPOROSIS: Primary | ICD-10-CM

## 2018-06-13 DIAGNOSIS — I10 BENIGN ESSENTIAL HYPERTENSION: ICD-10-CM

## 2018-06-13 PROCEDURE — 3075F SYST BP GE 130 - 139MM HG: CPT | Performed by: NURSE PRACTITIONER

## 2018-06-13 PROCEDURE — 99213 OFFICE O/P EST LOW 20 MIN: CPT | Performed by: NURSE PRACTITIONER

## 2018-06-13 PROCEDURE — 3078F DIAST BP <80 MM HG: CPT | Performed by: NURSE PRACTITIONER

## 2018-06-13 NOTE — PATIENT INSTRUCTIONS
Do due for blood work any time on or after the 7th of July  After that blood work is complete see me for a checkup and a repeat blood pressure check

## 2018-06-13 NOTE — PROGRESS NOTES
Chief Complaint   Patient presents with    Follow-up     hypertension        Patient ID: Mali Pickard is a 63 y o  female  Hypertension: Pt presents for follow up hypertension  Reports medication compliance with meds as listed in medication list  Pt denies cardiovascular sx of concern or new events at the time of the visit or since the previous visit  BP readings outside the office as reported by the patient are normotensive/at goal   Patient had an incremental increase in lisinopril after the last visit  There is an interval improvement in her blood pressure today  Past Medical History:   Diagnosis Date    Arthropathy     ONSET: [id number]    Cellulitis     ONSET: 72HGP9784    Costochondritis     ONSET: 61PTC1995    Dermatitis     ONSET: 19ORQ1860    Hemorrhagic detachment of retinal pigment epithelium     ONSET: 00QBI2737    Hypertension     LAST ASSESSED: 48CPH6266    Ingrown nail     LAST ASSESSED: 95IMB6139    Labyrinthitis     ONSET: 98PPO0223    Lymphadenopathy     ONSET: 69CMO5108    Myalgia     ONSET: 06OAD9467    Myositis     ONSET: 62LXY0340    Nonallopathic lesion     ONSET: 99VOV6381    Shortness of breath     ONSET: 16XWN4510    Systemic lupus erythematosus (HCC)     LAST ASSESSED: 45WPO2704    Tinea corporis     ONSET: 95GBT2257    Vertigo     LAST ASSESSED: 63MHZ4665       History reviewed  No pertinent surgical history      Patient Active Problem List   Diagnosis    Benign essential hypertension    Edema extremities    Hyperlipidemia    Impaired fasting glucose    MTHFR mutation (HCC)    Osteopenia    Systemic lupus erythematosus (HCC)    Thrombocytosis (HCC)    Vitamin D deficiency    Plantar wart       Family History   Problem Relation Age of Onset    Stomach cancer Mother      MALIGNANT NEOPLASM    Hypertension Father     Coronary artery disease Sister     Coronary artery disease Brother     Other Brother      CARDIAC PACEMAKER, CARDIOMEGALY Immunization History   Administered Date(s) Administered    Influenza Quadrivalent Preservative Free 3 years and older IM 09/25/2014    Influenza TIV (IM) 09/05/2009, 09/23/2010, 10/01/2011, 09/26/2013, 09/20/2017    Td (adult), adsorbed 07/14/1997    Tdap 05/26/2016       Allergies   Allergen Reactions    Other      Reaction Date: [id number]; Annotation - [id number]: rash   madlpn adhesive tape    Penicillins Rash     Reaction Date: [id number]; Annotation - [id number]: [email or web address]       Current Outpatient Prescriptions   Medication Sig Dispense Refill    aspirin (ECOTRIN LOW STRENGTH) 81 mg EC tablet Take 81 mg by mouth daily      Cholecalciferol (VITAMIN D3) 3000 units TABS Take 1,000 Units by mouth 2 (two) times a day        Coenzyme Q10 (COQ10) 200 MG CAPS Take 1 capsule by mouth daily      ezetimibe (ZETIA) 10 mg tablet Take 1 tablet (10 mg total) by mouth daily 90 tablet 1    lisinopril (ZESTRIL) 20 mg tablet Take 1 tablet (20 mg total) by mouth daily (Patient taking differently: Take 10 mg by mouth daily  ) 90 tablet 0    metFORMIN (GLUCOPHAGE) 500 mg tablet Take 1 tablet (500 mg total) by mouth 2 (two) times a day 180 tablet 1    Omega-3 Fatty Acids (FISH OIL) 1200 MG CAPS Take 2 capsules by mouth 2 (two) times a day      atorvastatin (LIPITOR) 20 mg tablet Take 1 tablet (20 mg total) by mouth daily at bedtime for 90 days 90 tablet 1     No current facility-administered medications for this visit  Social History     Social History    Marital status: Single     Spouse name: N/A    Number of children: N/A    Years of education: N/A     Social History Main Topics    Smoking status: Never Smoker    Smokeless tobacco: Never Used    Alcohol use Yes      Comment: rare    Drug use: No    Sexual activity: Not Asked     Other Topics Concern    None     Social History Narrative    DAILY COLA CONSUMPTION (1 CANS/DAY)       Review of Systems   Constitutional: Negative  HENT: Negative      Eyes: Negative  Respiratory: Negative  Cardiovascular: Negative  Gastrointestinal: Negative  Endocrine: Negative  Genitourinary: Negative  Musculoskeletal: Negative  Skin: Negative  Allergic/Immunologic: Negative  Neurological: Negative  Hematological: Negative  Psychiatric/Behavioral: Negative  Objective:    /72 (BP Location: Left arm, Patient Position: Sitting, Cuff Size: Large)   Pulse 76   Resp 14   Ht 5' 1" (1 549 m)   Wt 91 8 kg (202 lb 6 4 oz)   BMI 38 24 kg/m²        Physical Exam   Constitutional: She is oriented to person, place, and time  She appears well-developed and well-nourished  No distress  HENT:   Head: Normocephalic  Right Ear: External ear normal    Left Ear: External ear normal    Eyes: Conjunctivae are normal  No scleral icterus  Neck: No JVD present  Cardiovascular: Normal rate, regular rhythm and normal heart sounds  Pulmonary/Chest: Effort normal and breath sounds normal    Musculoskeletal: She exhibits no edema  Neurological: She is alert and oriented to person, place, and time  Skin: Skin is warm and dry  Psychiatric: She has a normal mood and affect  Assessment/Plan:    No problem-specific Assessment & Plan notes found for this encounter  Diagnoses and all orders for this visit:    Encounter for screening for osteoporosis  -     DXA bone density spine hip and pelvis; Future    Benign essential hypertension  Comments:  Interval improvement post increase in lisinopril, repeat blood pressure in one month  Orders:  -     Lipid panel; Future  -     Comprehensive metabolic panel; Future  -     Microalbumin / creatinine urine ratio    Hyperlipidemia, unspecified hyperlipidemia type  Comments:  Lipid levels at next lab interval   Orders:  -     Lipid panel; Future  -     Comprehensive metabolic panel; Future  -     TSH, 3rd generation with T4 reflex;  Future    MTHFR mutation [facility])  Comments:  Check homocystine with next lab interval   Orders:  -     Comprehensive metabolic panel; Future  -     Homocysteine, serum; Future    Vitamin D deficiency  Comments:  Check level with next lab interval   Orders:  -     Vitamin D 25 hydroxy; Future    Impaired fasting glucose  Comments:  Check fasting glucose and A1c with next lab interval   Orders:  -     Comprehensive metabolic panel; Future  -     Microalbumin / creatinine urine ratio  -     Hemoglobin A1C; Future            Patient Instructions   Do due for blood work any time on or after the 7th of July  After that blood work is complete see me for a checkup and a repeat blood pressure check                      Karie De La O

## 2018-07-12 LAB
25(OH)D3+25(OH)D2 SERPL-MCNC: 27.9 NG/ML (ref 30–100)
ALBUMIN SERPL-MCNC: 3.9 G/DL (ref 3.6–4.8)
ALBUMIN/GLOB SERPL: 1.4 {RATIO} (ref 1.2–2.2)
ALP SERPL-CCNC: 79 IU/L (ref 39–117)
ALT SERPL-CCNC: 17 IU/L (ref 0–32)
AMBIG ABBREV DEFAULT: NORMAL
AST SERPL-CCNC: 15 IU/L (ref 0–40)
BILIRUB SERPL-MCNC: 0.9 MG/DL (ref 0–1.2)
BUN SERPL-MCNC: 10 MG/DL (ref 8–27)
BUN/CREAT SERPL: 14 (ref 12–28)
CALCIUM SERPL-MCNC: 9 MG/DL (ref 8.7–10.3)
CHLORIDE SERPL-SCNC: 103 MMOL/L (ref 96–106)
CHOLEST SERPL-MCNC: 143 MG/DL (ref 100–199)
CO2 SERPL-SCNC: 26 MMOL/L (ref 20–29)
CREAT SERPL-MCNC: 0.74 MG/DL (ref 0.57–1)
GLOBULIN SER-MCNC: 2.7 G/DL (ref 1.5–4.5)
GLUCOSE SERPL-MCNC: 116 MG/DL (ref 65–99)
HBA1C MFR BLD: 5.8 % (ref 4.8–5.6)
HCYS SERPL-SCNC: 8.9 UMOL/L (ref 0–15)
HDLC SERPL-MCNC: 46 MG/DL
LDLC SERPL CALC-MCNC: 77 MG/DL (ref 0–99)
MICRODELETION SYND BLD/T FISH: NORMAL
POTASSIUM SERPL-SCNC: 5.3 MMOL/L (ref 3.5–5.2)
PROT SERPL-MCNC: 6.6 G/DL (ref 6–8.5)
SL AMB EGFR AFRICAN AMERICAN: 102 ML/MIN/1.73
SL AMB EGFR NON AFRICAN AMERICAN: 88 ML/MIN/1.73
SL AMB VLDL CHOLESTEROL CALC: 20 MG/DL (ref 5–40)
SODIUM SERPL-SCNC: 143 MMOL/L (ref 134–144)
TRIGL SERPL-MCNC: 99 MG/DL (ref 0–149)
TSH SERPL DL<=0.005 MIU/L-ACNC: 2.57 UIU/ML (ref 0.45–4.5)

## 2018-07-17 ENCOUNTER — OFFICE VISIT (OUTPATIENT)
Dept: OBGYN CLINIC | Facility: CLINIC | Age: 63
End: 2018-07-17
Payer: OTHER MISCELLANEOUS

## 2018-07-17 ENCOUNTER — APPOINTMENT (OUTPATIENT)
Dept: RADIOLOGY | Facility: CLINIC | Age: 63
End: 2018-07-17
Payer: OTHER MISCELLANEOUS

## 2018-07-17 VITALS
SYSTOLIC BLOOD PRESSURE: 128 MMHG | HEART RATE: 76 BPM | WEIGHT: 201 LBS | DIASTOLIC BLOOD PRESSURE: 81 MMHG | BODY MASS INDEX: 36.99 KG/M2 | HEIGHT: 62 IN

## 2018-07-17 DIAGNOSIS — M22.41 CHONDROMALACIA OF PATELLOFEMORAL JOINT, RIGHT: Primary | ICD-10-CM

## 2018-07-17 DIAGNOSIS — M22.2X1 PATELLOFEMORAL SYNDROME OF RIGHT KNEE: ICD-10-CM

## 2018-07-17 DIAGNOSIS — M25.561 RIGHT KNEE PAIN, UNSPECIFIED CHRONICITY: ICD-10-CM

## 2018-07-17 PROCEDURE — 73562 X-RAY EXAM OF KNEE 3: CPT

## 2018-07-17 PROCEDURE — 99204 OFFICE O/P NEW MOD 45 MIN: CPT | Performed by: ORTHOPAEDIC SURGERY

## 2018-07-17 RX ORDER — ATORVASTATIN CALCIUM 20 MG/1
20 TABLET, FILM COATED ORAL DAILY
COMMUNITY
End: 2018-09-19 | Stop reason: SDUPTHER

## 2018-07-17 RX ORDER — MELOXICAM 7.5 MG/1
7.5 TABLET ORAL DAILY
Qty: 30 TABLET | Refills: 1 | Status: SHIPPED | OUTPATIENT
Start: 2018-07-17 | End: 2018-09-19

## 2018-07-17 NOTE — PROGRESS NOTES
Assessment/Plan:  1  Chondromalacia of patellofemoral joint, right  meloxicam (MOBIC) 7 5 mg tablet    Ambulatory referral to Physical Therapy   2  Patellofemoral syndrome of right knee  meloxicam (MOBIC) 7 5 mg tablet    Ambulatory referral to Physical Therapy   3  Right knee pain, unspecified chronicity  XR knee 3 vw right non injury     Gillian Morales is a pleasant 51-year-old female presenting for right knee pain after a fall at work 3 months ago  After review of her history, exam, and all available imaging, we believe that she has chondromalacia of the right patellofemoral joint with mild osteoarthritis  We explained that she does not have a surgical issue, and that should be amenable to conservative treatment  We fit her today with a lateral J brace that she should wear while active to help improve the patellofemoral tracking  We have also referred her to formal physical therapy for strengthening and stretching of the right lower extremity with the focus on the quadriceps  Additionally, we have given her a daily anti-inflammatory to take with food  She was educated about potential side effects and reasons to discontinue the medication  We would like to see her back in 2 months for a clinical re-evaluation, she will not need new x-rays at that time  All of her questions were answered today  Subjective: Right knee pain    Patient ID: Marlyn Vo is a 63 y o  female  Maria E Valadez is a pleasant 51-year-old female presenting for evaluation of right knee pain  She was at work on April 8, 2018, when she was struck by car door and fell directly onto her right knee onto a concrete slab  She was able to ambulate afterwards  She was evaluated by providers at the doctor is in, and given anti-inflammatories  She underwent an MRI at the end of May which she brought with her today  She has not tried any physical therapy or braces    She is not currently taking an anti-inflammatory, but rather Tylenol, which does provide benefit  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  She has difficulty after prolonged standing  She describes mostly anterior knee pain and pain with bending, squatting, kneeling, and lifting  Occasionally, she will have a sensation that the knee may give way but it does not  She does not have any locking or catching  Review of Systems   Constitutional: Negative  HENT: Negative  Eyes: Negative  Respiratory: Negative  Cardiovascular: Negative  Gastrointestinal: Negative  Endocrine: Negative  Genitourinary: Negative  Musculoskeletal: Positive for arthralgias, joint swelling and myalgias  Skin: Negative  Allergic/Immunologic: Negative  Neurological: Negative  Hematological: Negative  Psychiatric/Behavioral: Negative  Past Medical History:   Diagnosis Date    Arthropathy     ONSET: [id number]    Cellulitis     ONSET: 86MDW5933    Costochondritis     ONSET: 28RVC8575    Dermatitis     ONSET: 86DKH3945    Hemorrhagic detachment of retinal pigment epithelium     ONSET: 99GLY7586    Hypertension     LAST ASSESSED: 08CAZ0768    Ingrown nail     LAST ASSESSED: 59FLC1454    Labyrinthitis     ONSET: 64ZBZ9360    Lymphadenopathy     ONSET: 14QCA5964    Myalgia     ONSET: 96BOP8885    Myositis     ONSET: 39XBK2532    Nonallopathic lesion     ONSET: 62TGF2773    Shortness of breath     ONSET: 96LJV2243    Systemic lupus erythematosus (HCC)     LAST ASSESSED: 21TIC6754    Tinea corporis     ONSET: 98YQE6928    Vertigo     LAST ASSESSED: 84LNO9046       No past surgical history on file  Family History   Problem Relation Age of Onset    Stomach cancer Mother         MALIGNANT NEOPLASM    Hypertension Father     Coronary artery disease Sister     Coronary artery disease Brother     Other Brother         CARDIAC PACEMAKER, CARDIOMEGALY       Social History     Occupational History    Not on file  Social History Main Topics    Smoking status: Never Smoker    Smokeless tobacco: Never Used    Alcohol use Yes      Comment: rare    Drug use: No    Sexual activity: Not on file         Current Outpatient Prescriptions:     aspirin (ECOTRIN LOW STRENGTH) 81 mg EC tablet, Take 81 mg by mouth daily, Disp: , Rfl:     atorvastatin (LIPITOR) 20 mg tablet, Take 20 mg by mouth daily, Disp: , Rfl:     Cholecalciferol (VITAMIN D3) 3000 units TABS, Take 1,000 Units by mouth 2 (two) times a day  , Disp: , Rfl:     Coenzyme Q10 (COQ10) 200 MG CAPS, Take 1 capsule by mouth daily, Disp: , Rfl:     lisinopril (ZESTRIL) 20 mg tablet, Take 1 tablet (20 mg total) by mouth daily (Patient taking differently: Take 10 mg by mouth daily  ), Disp: 90 tablet, Rfl: 0    metFORMIN (GLUCOPHAGE) 500 mg tablet, Take 1 tablet (500 mg total) by mouth 2 (two) times a day, Disp: 180 tablet, Rfl: 1    Omega-3 Fatty Acids (FISH OIL) 1200 MG CAPS, Take 2 capsules by mouth 2 (two) times a day, Disp: , Rfl:     atorvastatin (LIPITOR) 20 mg tablet, Take 1 tablet (20 mg total) by mouth daily at bedtime for 90 days, Disp: 90 tablet, Rfl: 1    ezetimibe (ZETIA) 10 mg tablet, Take 1 tablet (10 mg total) by mouth daily, Disp: 90 tablet, Rfl: 1    meloxicam (MOBIC) 7 5 mg tablet, Take 1 tablet (7 5 mg total) by mouth daily, Disp: 30 tablet, Rfl: 1    Allergies   Allergen Reactions    Other      Reaction Date: [id number]; Annotation - [id number]: rash   madlpn adhesive tape    Penicillins Rash     Reaction Date: [id number]; Annotation - [id number]: jjw       Objective:  Vitals:    07/17/18 1711   BP: 128/81   Pulse: 76       Body mass index is 36 76 kg/m²  Right Knee Exam     Tenderness   The patient is experiencing tenderness in the medial joint line, lateral joint line and patella (tender lateral patellar facet)      Range of Motion   Extension:  0 normal   Flexion:  120 (positive PFG, mild crepitus) normal     Muscle Strength     The patient has normal right knee strength  Tests   Kim:  Medial - negative Lateral - negative  Lachman:  Anterior - negative      Drawer:       Anterior - negative      Varus: negative  Valgus: negative  Patellar Apprehension: negative    Other   Erythema: absent  Scars: absent  Sensation: normal  Pulse: present  Swelling: none  Other tests: no effusion present    Comments:  Collateral ligaments stable at 0, 30, and 90 degrees  Negative Apley/Kim  Thigh and calf soft and nontender  Normal sensation L2-S1  Ambulates with slightly antalgic gait on the right          Observations     Right Knee   Negative for effusion  Physical Exam   Constitutional: She is oriented to person, place, and time  She appears well-developed and well-nourished  Body mass index is 36 76 kg/m²  HENT:   Head: Normocephalic and atraumatic  Eyes: EOM are normal    Neck: Normal range of motion  Cardiovascular: Intact distal pulses  Pulmonary/Chest: Effort normal    Musculoskeletal:        Right knee: She exhibits no effusion  See ortho exam   Neurological: She is alert and oriented to person, place, and time  Skin: Skin is warm and dry  Psychiatric: She has a normal mood and affect  Her behavior is normal  Judgment and thought content normal        I have personally reviewed pertinent films in PACS of the x-rays taken today of her right knee which demonstrate mild degenerative changes of the medial and patellofemoral compartments with mild joint space narrowing  There is marginal osteophytes of each compartment  There is a slight lateral tilt to the patella on the sunrise view  We also reviewed the previous MRI, but there is no report available  By our interpretation, there is no damage to the collateral or cruciate ligaments  The menisci appear intact  She does have chondromalacia of the medial and patellofemoral compartments with a small joint effusion

## 2018-07-20 ENCOUNTER — TELEPHONE (OUTPATIENT)
Dept: OBGYN CLINIC | Facility: CLINIC | Age: 63
End: 2018-07-20

## 2018-07-20 NOTE — TELEPHONE ENCOUNTER
Call from Jenelle Mckinnon, Workers comp  Call back # [phone]  Fax # [phone]  Dr Gail Baker is requesting an updated work status note  Please fax to [phone]  Thank you

## 2018-07-23 ENCOUNTER — TELEPHONE (OUTPATIENT)
Dept: OBGYN CLINIC | Facility: HOSPITAL | Age: 63
End: 2018-07-23

## 2018-07-23 NOTE — TELEPHONE ENCOUNTER
Please write/fax a note stating that she may return to work without any restrictions dated today  She may wear the knee brace for comfort  Thank you!

## 2018-07-23 NOTE — TELEPHONE ENCOUNTER
Pt  Called stating that her J brace is too tight and it takes her a half an hour to get on since getting it, but now it cant go on and it has given her blisters and cuts  Pt  Stated that she feels this is the wrong size brace and would like to know what to do since the brace cannot go on  Please advise  Thank You

## 2018-07-24 ENCOUNTER — HOSPITAL ENCOUNTER (OUTPATIENT)
Dept: RADIOLOGY | Facility: HOSPITAL | Age: 63
Discharge: HOME | End: 2018-07-24
Payer: COMMERCIAL

## 2018-07-24 DIAGNOSIS — Z13.820 ENCOUNTER FOR SCREENING FOR OSTEOPOROSIS: ICD-10-CM

## 2018-07-24 PROCEDURE — 77080 DXA BONE DENSITY AXIAL: CPT

## 2018-07-24 NOTE — TELEPHONE ENCOUNTER
She may come back to the office on a weds or Friday to have the brace adjusted and re-fitted  Until she sees one of our fitters she should discontinue its use

## 2018-07-24 NOTE — TELEPHONE ENCOUNTER
She is not available until 7/31  I put her on the schedule  Let me know if she does not need an appt and is just able to drop in  I will take her out of the slot

## 2018-07-25 NOTE — TELEPHONE ENCOUNTER
She does not need an appointment with Dr Jennifer Lucas or me for the brace adjustment  The brace fitting reps are in our office on Wednesdays and Fridays from approximately 8-4:30  She can drop in on one of those days, and they will help adjust the brace  I can give the fitters a heads up if Jody Jamie knows when she will be able to come in

## 2018-07-26 ENCOUNTER — EVALUATION (OUTPATIENT)
Dept: PHYSICAL THERAPY | Facility: CLINIC | Age: 63
End: 2018-07-26
Payer: OTHER MISCELLANEOUS

## 2018-07-26 DIAGNOSIS — M22.41 CHONDROMALACIA OF PATELLOFEMORAL JOINT, RIGHT: Primary | ICD-10-CM

## 2018-07-26 DIAGNOSIS — M22.2X1 PATELLOFEMORAL SYNDROME OF RIGHT KNEE: ICD-10-CM

## 2018-07-26 PROCEDURE — 97110 THERAPEUTIC EXERCISES: CPT | Performed by: PHYSICAL THERAPIST

## 2018-07-26 PROCEDURE — G8990 OTHER PT/OT CURRENT STATUS: HCPCS | Performed by: PHYSICAL THERAPIST

## 2018-07-26 PROCEDURE — G8991 OTHER PT/OT GOAL STATUS: HCPCS | Performed by: PHYSICAL THERAPIST

## 2018-07-26 PROCEDURE — 97161 PT EVAL LOW COMPLEX 20 MIN: CPT | Performed by: PHYSICAL THERAPIST

## 2018-07-26 NOTE — PROGRESS NOTES
PT Evaluation     Today's date: 2018  Patient name: Pedro Phillips  : 1955  MRN: [id number]  Referring provider: Renay Flaherty  Dx:   Encounter Diagnosis     ICD-10-CM    1  Chondromalacia of patellofemoral joint, right M22 41 Ambulatory referral to Physical Therapy   2  Patellofemoral syndrome of right knee M22 2X1 Ambulatory referral to Physical Therapy                  Assessment  Impairments: abnormal muscle firing, abnormal or restricted ROM, activity intolerance, impaired physical strength, lacks appropriate home exercise program, pain with function, weight-bearing intolerance and poor body mechanics    Assessment details: Pedro Phillips is a 63 y o  female who presents to the clinic with signs and symptoms consistent with patella femoral pain syndrome and possible arthritic changes s/p fall in April  Patient reports having great days with her knee, but notes days with pain and stiffness after prolonged sitting or standing  Patient presents with the above listed impairments  She is very motivated to be rid of pain and should benefit from skilled physical therapy  Thank you for the referral       Barriers to therapy: Impairment Goals:  1  Patient will decrease complaints of pain to 3/10 at worst in 2 weeks  2  Patient will increase hip abduction MMT to 4/5 in 4 weeks  3  Patient will increase right knee extension to 0 degrees in 4 weeks     Functional Goals:  1  Patient will be independent with HEP in 2 weeks  2  Patient will ascend/descend flight of stairs without crawling in 4 weeks  3  Patient will sleep for full 8 hours without pain in 4 weeks  4    Patient will complete full 8 hour work shift standing with minimal complaints of pain in 4 weeks    Understanding of Dx/Px/POC: good   Prognosis: good    Plan  Patient would benefit from: skilled physical therapy  Planned modality interventions: electrical stimulation/Russian stimulation, cryotherapy and TENS  Planned therapy interventions: abdominal trunk stabilization, balance/weight bearing training, body mechanics training, community reintegration, flexibility, functional ROM exercises, graded activity, graded exercise, home exercise program, work reintegration, transfer training, therapeutic training, therapeutic activities, therapeutic exercise, strengthening, stretching, postural training, neuromuscular re-education, Moody taping, manual therapy, joint mobilization and massage  Frequency: 2x week  Duration in weeks: 4  Treatment plan discussed with: patient        Subjective Evaluation    History of Present Illness  Mechanism of injury: Patient reports her injury occurred  getting hit by a car door at work and falling on both knees  She reports she was having check-ups every Friday at a doctors office through her work  She then received an MRI  where a medial and lateral meniscal tear was found  She was also found (+) for patellar chondromalacia  She underwent an x-ray  which was (-) for a fracture and was then referred to physical therapy  She received a knee brace, but it began to break down her skin  She is scheduled to get a new brace this week  Pain Location:  No prior limitations noted   Occupation:   at [employer] - primarily sedentary, [place] [id number] on weekends - primarily on feet   Prior Functional Limitations:  AGG:  Prolonged sitting, prolonged standing, ascending/descending steps, unable to sleep a full night   Ease:  Walking around, Ice   Patient Goals:  "I want to be able to sleep a full night and I want to walk up the steps without having to crawl "  Pain  Current pain ratin  At best pain ratin  At worst pain ratin  Quality: throbbing          Objective     Tenderness     Right Knee   Tenderness in the inferior patella       Active Range of Motion   Left Knee   Flexion: 117 degrees   Extension: 0 degrees     Right Knee   Flexion: 122 degrees   Extension: -3 degrees     Strength/Myotome Testing     Right Hip   Planes of Motion   Flexion: 3+  Abduction: 3+    Left Knee   Flexion: 4  Extension: 4    Right Knee   Flexion: 4  Extension: 3+    Tests     Right Knee   Positive medial Kim  Negative active quad and lateral Kim             Precautions:  Lupus     Daily Treatment Diary     Manuals 7/26            PROM             Mobs                                       Exercise Diary              Bike             Recumbent Bike             Quad Sets 1x10            SLR             SL Hip ABD             Clamshells             Hamstring Curls             X-Walks             Mini-Squats             Terminal Knee Extension             Hamstring S             Prone Quad S                                                                                           HEP Review RAT                                                   Modalities             CP PRN

## 2018-07-30 ENCOUNTER — APPOINTMENT (OUTPATIENT)
Dept: PHYSICAL THERAPY | Facility: CLINIC | Age: 63
End: 2018-07-30
Payer: OTHER MISCELLANEOUS

## 2018-08-01 ENCOUNTER — OFFICE VISIT (OUTPATIENT)
Dept: PHYSICAL THERAPY | Facility: CLINIC | Age: 63
End: 2018-08-01
Payer: OTHER MISCELLANEOUS

## 2018-08-01 DIAGNOSIS — M22.2X1 PATELLOFEMORAL SYNDROME OF RIGHT KNEE: ICD-10-CM

## 2018-08-01 DIAGNOSIS — M22.41 CHONDROMALACIA OF PATELLOFEMORAL JOINT, RIGHT: Primary | ICD-10-CM

## 2018-08-01 PROCEDURE — 97140 MANUAL THERAPY 1/> REGIONS: CPT | Performed by: PHYSICAL THERAPIST

## 2018-08-01 PROCEDURE — 97112 NEUROMUSCULAR REEDUCATION: CPT | Performed by: PHYSICAL THERAPIST

## 2018-08-01 PROCEDURE — 97110 THERAPEUTIC EXERCISES: CPT | Performed by: PHYSICAL THERAPIST

## 2018-08-01 NOTE — PROGRESS NOTES
Daily Note     Today's date: 2018  Patient name: Manny Serrano  : 1955  MRN: [id number]  Referring provider: Jane Chiu  Dx:   Encounter Diagnosis     ICD-10-CM    1  Chondromalacia of patellofemoral joint, right M22 41    2  Patellofemoral syndrome of right knee M22 2X1        Start Time: 1745  Stop Time: 1830  Total time in clinic (min): 45 minutes    Subjective:  "I worked over the weekend and was on my feet all day long  It bothered me a bit, but it's been a little bit better "      Objective: See treatment diary below      Assessment: Tolerated treatment well  Patient demonstrated fatigue post treatment and would benefit from continued PT  Higher level activities including step-up and single leg stance on foam gave patient an increase in pain today  Focus needs to be on increased strengthening of the quadriceps as well as hip for increased joint stability  The patient received a bigger brace today, and we discussed the goals of eventually not needing the brace  She should continue to see progress with future visits  Plan: Continue per plan of care  Progress treatment as tolerated          Precautions:  Lupus     Daily Treatment Diary     Manuals            PROM  RT, PT           Mobs  Patellar, RT                                     Exercise Diary              Bike             Recumbent Bike             Quad Sets 1x10 X10, 5" holds           SLR             SL Hip ABD  2x10           Clamshells  Red, 2x10           Hamstring Curls             X-Walks  Red 20' x 2           Mini-Squats             Terminal Knee Extension  Green 2x10           Hamstring S             Prone Quad S             Single Leg Stance  30"x3           Single Leg Stance Foam  30"x1           Step Up  x3 - pain                                                  HEP Review RAT                                                   Modalities             CP PRN

## 2018-08-06 ENCOUNTER — APPOINTMENT (OUTPATIENT)
Dept: PHYSICAL THERAPY | Facility: CLINIC | Age: 63
End: 2018-08-06
Payer: OTHER MISCELLANEOUS

## 2018-08-08 ENCOUNTER — APPOINTMENT (OUTPATIENT)
Dept: PHYSICAL THERAPY | Facility: CLINIC | Age: 63
End: 2018-08-08
Payer: OTHER MISCELLANEOUS

## 2018-08-13 ENCOUNTER — APPOINTMENT (OUTPATIENT)
Dept: PHYSICAL THERAPY | Facility: CLINIC | Age: 63
End: 2018-08-13
Payer: OTHER MISCELLANEOUS

## 2018-08-15 ENCOUNTER — OFFICE VISIT (OUTPATIENT)
Dept: PHYSICAL THERAPY | Facility: CLINIC | Age: 63
End: 2018-08-15
Payer: OTHER MISCELLANEOUS

## 2018-08-15 ENCOUNTER — OFFICE VISIT (OUTPATIENT)
Dept: FAMILY MEDICINE CLINIC | Facility: CLINIC | Age: 63
End: 2018-08-15
Payer: COMMERCIAL

## 2018-08-15 VITALS
BODY MASS INDEX: 36.76 KG/M2 | SYSTOLIC BLOOD PRESSURE: 142 MMHG | DIASTOLIC BLOOD PRESSURE: 70 MMHG | WEIGHT: 201 LBS | TEMPERATURE: 98.1 F | RESPIRATION RATE: 14 BRPM | HEART RATE: 72 BPM

## 2018-08-15 DIAGNOSIS — I10 BENIGN ESSENTIAL HYPERTENSION: ICD-10-CM

## 2018-08-15 DIAGNOSIS — R73.01 IMPAIRED FASTING GLUCOSE: Primary | ICD-10-CM

## 2018-08-15 DIAGNOSIS — Z12.39 BREAST CANCER SCREENING: ICD-10-CM

## 2018-08-15 DIAGNOSIS — E87.5 SERUM POTASSIUM ELEVATED: ICD-10-CM

## 2018-08-15 DIAGNOSIS — M22.41 CHONDROMALACIA OF PATELLOFEMORAL JOINT, RIGHT: Primary | ICD-10-CM

## 2018-08-15 DIAGNOSIS — M22.2X1 PATELLOFEMORAL SYNDROME OF RIGHT KNEE: ICD-10-CM

## 2018-08-15 DIAGNOSIS — E78.5 HYPERLIPIDEMIA, UNSPECIFIED HYPERLIPIDEMIA TYPE: ICD-10-CM

## 2018-08-15 DIAGNOSIS — E55.9 VITAMIN D DEFICIENCY: ICD-10-CM

## 2018-08-15 DIAGNOSIS — Z15.89 MTHFR MUTATION: ICD-10-CM

## 2018-08-15 PROCEDURE — 97112 NEUROMUSCULAR REEDUCATION: CPT | Performed by: PHYSICAL THERAPIST

## 2018-08-15 PROCEDURE — 97110 THERAPEUTIC EXERCISES: CPT | Performed by: PHYSICAL THERAPIST

## 2018-08-15 PROCEDURE — 97140 MANUAL THERAPY 1/> REGIONS: CPT | Performed by: PHYSICAL THERAPIST

## 2018-08-15 PROCEDURE — 99214 OFFICE O/P EST MOD 30 MIN: CPT | Performed by: NURSE PRACTITIONER

## 2018-08-15 NOTE — PROGRESS NOTES
Chief Complaint   Patient presents with    Blood Pressure Check     review labs, dexa        Patient ID: Pedro Phillips is a 63 y o  female  HTN:  Patient's blood pressure today is stage I hypertension  She does not monitor blood pressure at home  She states she is not currently taking meloxicam for her musculoskeletal pain/lupus  She does report compliance with her blood pressure medication and denies side effects on same  She denies any cardiovascular symptoms of concern at the time of the visit  She states she feels this out of character elevation in her blood pressure may be due to working a lot of overtime and not sleeping well  She denies any pain at the time of the visit  Of note there is a mild increase in her serum potassium on her most recent lab assessment  She has no known history of renal disease  Some question if the specimen could of been somewhat hemolyzed  Hyperlipidemia/MTHFR SNP:Pt presents for follow up hyperlipidemia  Pt reports medication compliance to medications for disorder as seen in medication legend  Pt denies cardiovascular symptoms of concern at the time of the visit or events of concern at the time of the visit or since the last visit  Pt denies medication side effects  Most recent lipid assessment shows her numbers to be at goal   Her most recent homocystine level is also at goal between eight and nine  Impaired fasting glucose:  Patient's most recent blood work shows interval improvement in her glycohemoglobin  Her glucose continues to be elevated over 100  Her current management is lifestyle management only  Her low glycemic diet has provided an interval reduction in her glycohemoglobin  Vit D deficiency:  Patient's most recent vitamin-D level is sub optimal   She also has a coexisting autoimmune disorder/lupus  Her vitamin-D supplementation at home is that contained within her calcium only    She does not take additional vitamin D on a daily basis  Past Medical History:   Diagnosis Date    Arthropathy     ONSET: [id number]    Cellulitis     ONSET: [id number]    Costochondritis     ONSET: 33QLI3083    Dermatitis     ONSET: 33TXB1547    Hemorrhagic detachment of retinal pigment epithelium     ONSET: 77CZH1790    Hypertension     LAST ASSESSED: 93ZGU6791    Ingrown nail     LAST ASSESSED: 84MZS6232    Labyrinthitis     ONSET: 49KZX1671    Lymphadenopathy     ONSET: 12PVM2060    Myalgia     ONSET: 20TFM3756    Myositis     ONSET: 21ULJ3103    Nonallopathic lesion     ONSET: 96SWF7738    Shortness of breath     ONSET: 89ZON8462    Systemic lupus erythematosus (HCC)     LAST ASSESSED: 72SAM1920    Tinea corporis     ONSET: 00ZVD6141    Vertigo     LAST ASSESSED: 00SZQ3616       History reviewed  No pertinent surgical history  Patient Active Problem List   Diagnosis    Benign essential hypertension    Edema extremities    Hyperlipidemia    Impaired fasting glucose    MTHFR mutation (HCC)    Osteopenia    Systemic lupus erythematosus (HCC)    Thrombocytosis (HCC)    Vitamin D deficiency    Plantar wart    Serum potassium elevated    Breast cancer screening       Family History   Problem Relation Age of Onset    Stomach cancer Mother         MALIGNANT NEOPLASM    Hypertension Father     Coronary artery disease Sister     Coronary artery disease Brother     Other Brother         CARDIAC PACEMAKER, CARDIOMEGALY       Immunization History   Administered Date(s) Administered    Influenza Quadrivalent Preservative Free 3 years and older IM 09/25/2014    Influenza TIV (IM) 09/05/2009, 09/23/2010, 10/01/2011, 09/26/2013, 09/20/2017    Td (adult), adsorbed 07/14/1997    Tdap 05/26/2016       Allergies   Allergen Reactions    Other      Reaction Date: [id number]; Annotation - [id number]: rash   madlpn adhesive tape    Penicillins Rash     Reaction Date: [id number];  Annotation - [id number]: dayron       Current Outpatient Prescriptions   Medication Sig Dispense Refill    aspirin (ECOTRIN LOW STRENGTH) 81 mg EC tablet Take 81 mg by mouth daily      atorvastatin (LIPITOR) 20 mg tablet Take 20 mg by mouth daily      Cholecalciferol (VITAMIN D3) 3000 units TABS Take 1,000 Units by mouth 2 (two) times a day        Coenzyme Q10 (COQ10) 200 MG CAPS Take 1 capsule by mouth daily      ezetimibe (ZETIA) 10 mg tablet Take 1 tablet (10 mg total) by mouth daily 90 tablet 1    lisinopril (ZESTRIL) 20 mg tablet Take 1 tablet (20 mg total) by mouth daily (Patient taking differently: Take 10 mg by mouth daily  ) 90 tablet 0    meloxicam (MOBIC) 7 5 mg tablet Take 1 tablet (7 5 mg total) by mouth daily 30 tablet 1    metFORMIN (GLUCOPHAGE) 500 mg tablet Take 1 tablet (500 mg total) by mouth 2 (two) times a day 180 tablet 1    Omega-3 Fatty Acids (FISH OIL) 1200 MG CAPS Take 2 capsules by mouth 2 (two) times a day       No current facility-administered medications for this visit  Social History     Social History    Marital status: Single     Spouse name: N/A    Number of children: N/A    Years of education: N/A     Social History Main Topics    Smoking status: Never Smoker    Smokeless tobacco: Never Used    Alcohol use Yes      Comment: rare    Drug use: No    Sexual activity: Not Asked     Other Topics Concern    None     Social History Narrative    DAILY COLA CONSUMPTION (1 CANS/DAY)       Review of Systems   Constitutional: Negative  HENT: Negative  Eyes: Negative  Respiratory: Negative  Cardiovascular: Negative  Gastrointestinal: Negative  Endocrine: Negative  Genitourinary: Negative  Musculoskeletal: Negative  Skin: Negative  Allergic/Immunologic: Negative  Neurological: Negative  Hematological: Negative  Psychiatric/Behavioral: Negative            Objective:    /70 (BP Location: Left arm, Patient Position: Sitting, Cuff Size: Adult)   Pulse 72   Temp 98 1 °F (36 7 °C) (Temporal)   Resp 14   Wt 91 2 kg (201 lb)   BMI 36 76 kg/m²        Physical Exam   Constitutional: She is oriented to person, place, and time  She appears well-developed and well-nourished  No distress  HENT:   Head: Normocephalic  Right Ear: External ear normal    Left Ear: External ear normal    Eyes: Conjunctivae are normal  No scleral icterus  Neck: No JVD present  Cardiovascular: Normal rate, regular rhythm and normal heart sounds  Pulmonary/Chest: Effort normal and breath sounds normal    Musculoskeletal: She exhibits no edema  Neurological: She is alert and oriented to person, place, and time  Skin: Skin is warm and dry  Psychiatric: She has a normal mood and affect           Orders Only on 07/11/2018   Component Date Value Ref Range Status    SL AMB GLUCOSE 07/11/2018 116* 65 - 99 mg/dL Final    BUN 07/11/2018 10  8 - 27 mg/dL Final    Creatinine, Serum 07/11/2018 0 74  0 57 - 1 00 mg/dL Final    eGFR Non  07/11/2018 88  >59 mL/min/1 73 Final    SL AMB EGFR  07/11/2018 102  >59 mL/min/1 73 Final    SL AMB BUN/CREATININE RATIO 07/11/2018 14  12 - 28 Final    SL AMB SODIUM 07/11/2018 143  134 - 144 mmol/L Final    SL AMB POTASSIUM 07/11/2018 5 3* 3 5 - 5 2 mmol/L Final    SL AMB CHLORIDE 07/11/2018 103  96 - 106 mmol/L Final    SL AMB CARBON DIOXIDE 07/11/2018 26  20 - 29 mmol/L Final    CALCIUM 07/11/2018 9 0  8 7 - 10 3 mg/dL Final    SL AMB PROTEIN, TOTAL 07/11/2018 6 6  6 0 - 8 5 g/dL Final    Serum Albumin 07/11/2018 3 9  3 6 - 4 8 g/dL Final    Globulin, Total 07/11/2018 2 7  1 5 - 4 5 g/dL Final    SL AMB ALBUMIN/GLOBULIN RATIO 07/11/2018 1 4  1 2 - 2 2 Final    SL AMB BILIRUBIN, TOTAL 07/11/2018 0 9  0 0 - 1 2 mg/dL Final    Alk Phos Isoenzymes 07/11/2018 79  39 - 117 IU/L Final    SL AMB AST 07/11/2018 15  0 - 40 IU/L Final    SL AMB ALT 07/11/2018 17  0 - 32 IU/L Final    Cholesterol, Total 07/11/2018 143  100 - 199 mg/dL Final  Triglycerides 07/11/2018 99  0 - 149 mg/dL Final    HDL 07/11/2018 46  >39 mg/dL Final     AMB VLDL CHOLESTEROL CALC 07/11/2018 20  5 - 40 mg/dL Final    LDL Direct 07/11/2018 77  0 - 99 mg/dL Final    Hemoglobin A1C 07/11/2018 5 8* 4 8 - 5 6 % Final    Comment:          Pre-diabetes: 5 7 - 6 4           Diabetes: >6 4           Glycemic control for adults with diabetes: <7 0      25-HYDROXY VIT D 07/11/2018 27 9* 30 0 - 100 0 ng/mL Final    Comment: Vitamin D deficiency has been defined by the [place] of  Medicine and an Endocrine Society practice guideline as a  level of serum 25-OH vitamin D less than 20 ng/mL (1,2)  The Endocrine Society went on to further define vitamin D  insufficiency as a level between 21 and 29 ng/mL (2)  1  IOM ([place] of Medicine)  2010  Dietary reference     intakes for calcium and D  [address] [facility]: The     [facility]  2  Ervin MF, Tanesha VALDEZ, Michelle BROWNE, et al      Evaluation, treatment, and prevention of vitamin D     deficiency: an Endocrine Society clinical practice     guideline  JCEM  2011 Jul; 96(7):1911-30   TSH 07/11/2018 2 570  0 450 - 4 500 uIU/mL Final    Homocyst(e)ine, P/S 07/11/2018 8 9  0 0 - 15 0 umol/L Final    AMBIG ABBREV DEFAULT 07/11/2018 Comment   Final    Comment: A hand-written panel/profile was received from your office  In  accordance with the [facility] Ambiguous Test Code Policy dated July 9547, we have completed your order by using the closest currently  or formerly recognized AMA panel  We have assigned Lipid Panel,  Test Code #399892 to this request  If this is not the testing you  wished to receive on this specimen, please contact the [facility]  Client Inquiry/Technical Services Department to clarify the test  order  We appreciate your business         AMB INTERPRETATION 07/11/2018 Note   Final    Comment: -------------------------------  CARDIOVASCULAR REPORT:  -------------------------------  Current available clinical information suggests the  patient's risk is at least LOW  One major CHD risk factor is  present (age over 54)  If the patient has CHD or a CHD risk  equivalent, the risk category is high  If patient does not  have CHD or a CHD risk equivalent, consider use of the  Pooled Cohort Equations to estimate 10-year CVD risk, as  individuals with greater than 7 5% risk may warrant more  intensive therapy  The calculator can be found at:  http://tools  cardiosource org/HEFEC-Jznz-Climadobt/  -  Insulin resistance, obesity, excessive alcohol use, smoking,  liver disease, and certain medications can cause secondary  dyslipidemia  Consider evaluation if clinically indicated  -  Patient was not fasting, interpret assessment and treatment  suggestions with caution  Therapeutic lifestyle changes are  always valuable to achieve optimal blood lipid status (diet,  exercise, weight manageme                           nt)   -------------------------------  LIPID MANAGEMENT  Select one patient risk category based upon medical history  and clinical judgment  Additional risk factors such as  personal or family history of premature CHD, smoking, and  hypertension modify a patient's goals of therapy  In CVD  prevention, the intensity of therapy should be adjusted to  the level of patient risk  MODERATE intensity statin therapy  generally results in an average LDL-C reduction of 30% to  less than 50% from the untreated baseline  Examples include  (daily doses): atorvastatin 10-20 mg, rosuvastatin 5-10 mg,  simvastatin 20-40 mg, pravastatin 40-80 mg, lovastatin 40  mg  HIGH intensity statin therapy generally results in an  average LDL-C reduction of 50% or more from the untreated  baseline   Examples include (daily doses): atorvastatin 40-80  mg and rosuvastatin 20 mg   -------------------------------  LOW RISK ASSESSMENT AND TREATMENT SUGGESTIONS  -------------------------------  LDL-C is optimal, was 62 a nd now is 77 mg/dL  Non-HDL  Cholesterol is optimal, was 78 and now is 97 mg/dL  -  Considerations for use of statin therapy include family  history of premature atherosclerotic disease, elevated  coronary artery calcium score, ankle-brachial index < 0 9,  elevated CRP, or elevated 10-year CVD risk   -------------------------------  INTERMEDIATE RISK ASSESSMENT AND TREATMENT SUGGESTIONS  -------------------------------  LDL-C is optimal, was 62 and now is 77 mg/dL  Non-HDL  Cholesterol is optimal, was 78 and now is 97 mg/dL  -  Consider measurement of LDL particle number or Apo B to  adjudicate need for further LDL lowering therapy  Factors  that may influence statin use include family history of  premature atherosclerotic disease, elevated coronary artery  calcium score, ankle-brachial index < 0 9, elevated CRP, or  elevated 10-year CVD risk  If statin cannot be tolerated or  increased, alternatives include use of an intestinal agent  (ezetimibe or bile acid sequestrant) or niacin                              -------------------------------  HIGH RISK ASSESSMENT AND TREATMENT SUGGESTIONS  -------------------------------  LDL-C is normal, was 62 and now is 77 mg/dL  Non-HDL  Cholesterol is optimal, was 78 and now is 97 mg/dL  -  If at least a 50% LDL reduction from baseline has not been  achieved, begin or increase statin  Consider measurement of  LDL particle number or Apo B to adjudicate need for further  LDL lowering therapy  If statin cannot be tolerated or  increased, alternatives include use of an intestinal agent  (ezetimibe or bile acid sequestrant) or niacin   -------------------------------  DISCLAIMER  These assessments and treatment suggestions are provided as  a convenience in support of the physician-patient  relationship and are not intended to replace the physician's  clinical judgment  They are derived from national guidelines  in addition to other evidence and expert opinion  The  clinician should consider this information within the  context of clinical opinion and the                            individual patient  SEE GUIDANCE FOR CARDIOVASCULAR REPORT: Roberto Segovia et al  2013  ACC/AHA guideline on the treatment of blood cholesterol to  reduce atherosclerotic cardiovascular risk in adults: a  report of the [employer] of Cardiology/American Heart  Association Task Force on Practice Guidelines  Circulation  2014; 129 (suppl 2): S1?S45; Yobani et al  Clin Chem 2009;  55(3):407-419; Alisson et al  Diabetes Care 2008;  31(4):811-82  Assessment/Plan:    No problem-specific Assessment & Plan notes found for this encounter  Diagnoses and all orders for this visit:    Impaired fasting glucose  Comments:  Interval improvement in glycohemoglobin  [address] [facility]  Benign essential hypertension  Comments:  Stage I hypertension reading today  Monitor at home and bring log to repeat office eval one month  Hyperlipidemia, unspecified hyperlipidemia type  Comments:  Current lipids at goal with current medications  No changes today  MTHFR mutation ([facility] [address] )  Comments:  Homocystine at goal     Vitamin D deficiency  Comments:  Begin 2000 international units of vitamin-D with food daily  Repeat level in three months  Serum potassium elevated  Comments:  Out of character, no history of renal disease, no pertinent medications, question hemolysis of the sample  Repeat level to be sure  Orders:  -     Potassium; Future    Breast cancer screening  Comments:  Complete mammogram  Orders:  -     Mammo screening bilateral w cad; Future            Patient Instructions   Your due for repeat potassium level in a week or two  You do not have to fast for that  Monitor blood pressure at home and write them down bring that log to your blood pressure check in a month  Call me if he need any medicine refills                      Anna Sanchez

## 2018-08-15 NOTE — PATIENT INSTRUCTIONS
Your due for repeat potassium level in a week or two  You do not have to fast for that  Monitor blood pressure at home and write them down bring that log to your blood pressure check in a month  Call me if he need any medicine refills

## 2018-08-15 NOTE — PROGRESS NOTES
Daily Note     Today's date: 8/15/2018  Patient name: Sin Lockhart  : 1955  MRN: [id number]  Referring provider: Jaleesa Albright  Dx:   Encounter Diagnosis     ICD-10-CM    1  Chondromalacia of patellofemoral joint, right M22 41    2  Patellofemoral syndrome of right knee M22 2X1        Start Time: 1800  Stop Time: 1845  Total time in clinic (min): 45 minutes    Subjective:  "My knee is still bothering me  My hardest thing is still going up and down the steps  I'm excited to be back in therapy and hopefully get my knee better "      Objective: See treatment diary below      Assessment: Tolerated treatment well  Patient demonstrated fatigue post treatment and would benefit from continued PT  Patient has finally been cleared to return to physical therapy  Main complaints continue to be instability and issues gong up and down steps  Manual overpressure was provided to the inferior patella during lunges on the step with decreased complaints of pain  Patient may benefit from taping to the inferior patella/fat pad  Needs to continue strengthening to support her knee  Plan: Continue per plan of care  Progress treatment as tolerated          Daily Treatment Diary     Manuals 7/26 8/1 8/15          PROM  RT, PT           Mobs  Patellar, RT Patella, RT                                     Exercise Diary              Bike             Recumbent Bike             Quad Sets 1x10 X10, 5" holds NP          SLR   2 5# 2x10          SL Hip ABD  2x10 1 5# 3x10          Clamshells  Red, 2x10 Red 2x10          Hamstring Curls             X-Walks  Red 20' x 2 Green 15'x4          Mini-Squats             Terminal Knee Extension  Green 2x10 NP          Hamstring S             Prone Quad S             Single Leg Stance  30"x3 NP          Single Leg Stance Foam  30"x1 NP          Step Up  x3 - pain Right leg lunging with increased WB           Bridges    2x10          Wobble Board    Both Directions 30" x 5 HEP Review RAT                                                   Modalities             CP PRN

## 2018-08-20 ENCOUNTER — OFFICE VISIT (OUTPATIENT)
Dept: PHYSICAL THERAPY | Facility: CLINIC | Age: 63
End: 2018-08-20
Payer: OTHER MISCELLANEOUS

## 2018-08-20 DIAGNOSIS — M22.41 CHONDROMALACIA OF PATELLOFEMORAL JOINT, RIGHT: Primary | ICD-10-CM

## 2018-08-20 DIAGNOSIS — E78.5 HYPERLIPIDEMIA, UNSPECIFIED HYPERLIPIDEMIA TYPE: ICD-10-CM

## 2018-08-20 DIAGNOSIS — R73.01 IMPAIRED FASTING GLUCOSE: ICD-10-CM

## 2018-08-20 DIAGNOSIS — M22.2X1 PATELLOFEMORAL SYNDROME OF RIGHT KNEE: ICD-10-CM

## 2018-08-20 DIAGNOSIS — I10 BENIGN ESSENTIAL HYPERTENSION: ICD-10-CM

## 2018-08-20 PROCEDURE — 97110 THERAPEUTIC EXERCISES: CPT | Performed by: PHYSICAL THERAPIST

## 2018-08-20 PROCEDURE — 97140 MANUAL THERAPY 1/> REGIONS: CPT | Performed by: PHYSICAL THERAPIST

## 2018-08-20 PROCEDURE — 97112 NEUROMUSCULAR REEDUCATION: CPT | Performed by: PHYSICAL THERAPIST

## 2018-08-20 RX ORDER — LISINOPRIL 10 MG/1
10 TABLET ORAL DAILY
Qty: 90 TABLET | Refills: 1 | Status: SHIPPED | OUTPATIENT
Start: 2018-08-20 | End: 2018-09-19 | Stop reason: SDUPTHER

## 2018-08-20 RX ORDER — EZETIMIBE 10 MG/1
10 TABLET ORAL DAILY
Qty: 90 TABLET | Refills: 1 | Status: SHIPPED | OUTPATIENT
Start: 2018-08-20 | End: 2020-03-25 | Stop reason: SDUPTHER

## 2018-08-20 NOTE — PROGRESS NOTES
Daily Note     Today's date: 2018  Patient name: Zulema Haas  : 1955  MRN: [id number]  Referring provider: Fernando Turner  Dx:   Encounter Diagnosis     ICD-10-CM    1  Chondromalacia of patellofemoral joint, right M22 41    2  Patellofemoral syndrome of right knee M22 2X1        Start Time: 1710  Stop Time: 1755  Total time in clinic (min): 45 minutes    Subjective:  "My knee popped at work yesterday but it actually felt good  I was sore after the last visit "      Objective: See treatment diary below      Assessment: Tolerated treatment well  Patient would benefit from continued PT  Patient was able to complete step ups today without any complaints of pain  General soreness noted throughout the session, but no pain  Patient should continue to progress with further skilled physical therapy  Quad strengthening needs to be increased to help support the knee  Plan: Progress treatment as tolerated  Daily Treatment Diary     Manuals 7/26 8/1 8/15 8/20         PROM  RT, PT  RAT, hip add, hip abd, hams           Mobs  Patellar, RT Patella, RT  Patella, RAT                                   Exercise Diary              Bike             Recumbent Bike             Quad Sets 1x10 X10, 5" holds NP NP         SLR   2 5# 2x10 2 5# 2x10         SL Hip ABD  2x10 1 5# 3x10 1 5# 3x10         SL Hip Adduciton    0# 2x10         Clamshells  Red, 2x10 Red 2x10 Green 2x10         Hamstring Curls             X-Walks  Red 20' x 2 Green 15'x4 NP         Mini-Squats             Terminal Knee Extension  Green 2x10 NP NP         Hamstring S             Prone Quad S             Single Leg Stance  30"x3 NP DC         Single Leg Stance Foam  30"x1 NP 30"x3         Step Up  x3 - pain Right leg lunging with increased WB  Right leg full step up x 15         Bridges    2x10 2x10         Wobble Board    Both Directions 30" x 5          Lateral step down     x20         HEP Review RAT Modalities             CP PRN

## 2018-08-22 ENCOUNTER — EVALUATION (OUTPATIENT)
Dept: PHYSICAL THERAPY | Facility: CLINIC | Age: 63
End: 2018-08-22
Payer: OTHER MISCELLANEOUS

## 2018-08-22 DIAGNOSIS — M22.41 CHONDROMALACIA OF PATELLOFEMORAL JOINT, RIGHT: Primary | ICD-10-CM

## 2018-08-22 DIAGNOSIS — M22.2X1 PATELLOFEMORAL SYNDROME OF RIGHT KNEE: ICD-10-CM

## 2018-08-22 PROCEDURE — 97110 THERAPEUTIC EXERCISES: CPT | Performed by: PHYSICAL THERAPIST

## 2018-08-22 PROCEDURE — G8991 OTHER PT/OT GOAL STATUS: HCPCS | Performed by: PHYSICAL THERAPIST

## 2018-08-22 PROCEDURE — 97140 MANUAL THERAPY 1/> REGIONS: CPT | Performed by: PHYSICAL THERAPIST

## 2018-08-22 PROCEDURE — G8990 OTHER PT/OT CURRENT STATUS: HCPCS | Performed by: PHYSICAL THERAPIST

## 2018-08-22 PROCEDURE — 97112 NEUROMUSCULAR REEDUCATION: CPT | Performed by: PHYSICAL THERAPIST

## 2018-08-22 NOTE — PROGRESS NOTES
PT Re-Evaluation     Today's date: 2018  Patient name: Demetrius Vigil  : 1955  MRN: [id number]  Referring provider: Franci Bob  Dx:   Encounter Diagnosis     ICD-10-CM    1  Chondromalacia of patellofemoral joint, right M22 41    2  Patellofemoral syndrome of right knee M22 2X1        Start Time: 1700  Stop Time: 1755  Total time in clinic (min): 55 minutes    Assessment  Impairments: abnormal muscle firing, activity intolerance, impaired physical strength, pain with function, weight-bearing intolerance and poor body mechanics    Assessment details: Demetrius Vigil is a 63 y o  female who has had 5 physical therapy visits so far for her right knee  There has been slight delays in her ability to attend therapy logistical issues  At this point, the patient has increased her pain free ROM, but still has pain with prolonged weight bearing and with ascending and descending steps  Most of her pain complaints are focused around the inferior patella  The patient has shown independence with her HEP and is motivated to continue therapy to reach all of her functional goals  At this time, the patient is slowly progressing and should meet her goals with continued physical therapy  Understanding of Dx/Px/POC: good   Prognosis: good    Goals  Impairment Goals:  1  Patient will decrease complaints of pain to 3/10 at worst in 2 weeks - PARTIALLY MET   2  Patient will increase hip abduction MMT to 4/5 in 4 weeks - PARTIALLY MET   3  Patient will increase right knee extension to 0 degrees in 4 weeks - MET     Functional Goals:  1  Patient will be independent with HEP in 2 weeks - MET   2  Patient will ascend/descend flight of stairs without crawling in 4 weeks - PARTIALLY MET   3  Patient will sleep for full 8 hours without pain in 4 weeks - MET   4    Patient will complete full 8 hour work shift standing with minimal complaints of pain in 4 weeks - PARTIALLY MET         Plan  Patient would benefit from: skilled physical therapy  Planned modality interventions: electrical stimulation/Russian stimulation, cryotherapy and TENS  Planned therapy interventions: abdominal trunk stabilization, balance/weight bearing training, body mechanics training, community reintegration, flexibility, functional ROM exercises, graded activity, graded exercise, home exercise program, work reintegration, transfer training, therapeutic training, therapeutic activities, therapeutic exercise, strengthening, stretching, postural training, neuromuscular re-education, Moody taping, manual therapy, joint mobilization, massage, patient education, ADL retraining and muscle pump exercises  Frequency: 2x week  Duration in weeks: 4  Treatment plan discussed with: patient        Subjective Evaluation    History of Present Illness  Mechanism of injury: Patient reports she is 50% improved, 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"  She reports she wants to know why she has days in a row where she feels "nothing" but other days she has pain  She is encouraged by her progress and wants to continue  Pain Location:  Lateral knee cap  Occupation:   at [employer] - primarily sedentary, [place] on weekends - primarily on feet   Prior Functional Limitations:  No limitations prior   AGG: Prolonged standing > 3 hours, ascending/descending steps,   Ease:  Relaxing and elevating  Patient Goals:  "I want to be able to sleep a full night and I want to walk up the steps without having to crawl "  Pain  Current pain ratin  At best pain ratin  At worst pain ratin  Quality: throbbing          Objective     Tenderness     Right Knee   Tenderness in the inferior patella       Active Range of Motion   Left Knee   Flexion: 125 degrees   Extension: 0 degrees     Right Knee   Flexion: 125 degrees   Extension: 0 degrees     Strength/Myotome Testing     Right Hip   Planes of Motion   Flexion: 3+  Abduction: 3+    Left Knee   Flexion: 4+  Extension: 4+    Right Knee   Flexion: 4  Extension: 4+    Tests     Right Knee   Negative active quad, lateral Kim and medial Kim  Flowsheet Rows      Most Recent Value   PT/OT G-Codes   Current Score  51   Projected Score  79   FOTO information reviewed  Yes   Assessment Type  Re-evaluation   G code set  Other PT/OT Primary   Other PT Primary Current Status ()  CK   Other PT Primary Goal Status ()  CH          Precautions:  Lupus     Daily Treatment Diary     Daily Treatment Diary     Manuals 7/26 8/1 8/15 8/20 8/22        PROM  RT, PT  RAT, hip add, hip abd, hams   NP        Mobs  Patellar, RT Patella, RT  Patella, RAT Patellar Mobs         Taping      Fat Pad         IASTM      RAT - Distal IT band        Exercise Diary              Bike             Recumbent Bike             Quad Sets 1x10 X10, 5" holds NP NP         SLR   2 5# 2x10 2 5# 2x10         SL Hip ABD  2x10 1 5# 3x10 1 5# 3x10         SL Hip Adduciton    0# 2x10         Clamshells  Red, 2x10 Red 2x10 Green 2x10         Hamstring Curls             X-Walks  Red 20' x 2 Green 15'x4 NP         Mini-Squats             Terminal Knee Extension  Green 2x10 NP NP         Hamstring S             Prone Quad S             Single Leg Stance  30"x3 NP DC         Single Leg Stance Foam  30"x1 NP 30"x3         Step Up  x3 - pain Right leg lunging with increased WB  Right leg full step up x 15 Right Leg full step up and step down x 20        Bridges    2x10 2x10 NP        Wobble Board    Both Directions 30" x 5          Lateral step down     x20 NP        Lunges on Bosu Ball     x20                     HEP Review RAT    RAT        Review of Foto / Re-Evaluation     RAT                                  Modalities             CP PRN

## 2018-08-27 ENCOUNTER — OFFICE VISIT (OUTPATIENT)
Dept: PHYSICAL THERAPY | Facility: CLINIC | Age: 63
End: 2018-08-27
Payer: OTHER MISCELLANEOUS

## 2018-08-27 DIAGNOSIS — M22.2X1 PATELLOFEMORAL SYNDROME OF RIGHT KNEE: ICD-10-CM

## 2018-08-27 DIAGNOSIS — M22.41 CHONDROMALACIA OF PATELLOFEMORAL JOINT, RIGHT: Primary | ICD-10-CM

## 2018-08-27 PROCEDURE — 97140 MANUAL THERAPY 1/> REGIONS: CPT | Performed by: PHYSICAL THERAPIST

## 2018-08-27 PROCEDURE — 97112 NEUROMUSCULAR REEDUCATION: CPT | Performed by: PHYSICAL THERAPIST

## 2018-08-27 PROCEDURE — 97110 THERAPEUTIC EXERCISES: CPT | Performed by: PHYSICAL THERAPIST

## 2018-08-27 NOTE — PROGRESS NOTES
Daily Note     Today's date: 2018  Patient name: Pedro Phillips  : 1955  MRN: [id number]  Referring provider: Renay Flaherty  Dx:   Encounter Diagnosis     ICD-10-CM    1  Chondromalacia of patellofemoral joint, right M22 41    2  Patellofemoral syndrome of right knee M22 2X1        Start Time: 1715  Stop Time: 1800  Total time in clinic (min): 45 minutes    Subjective:  "The worst pain I had over the weekend was like a 4/10"      Objective: See treatment diary below      Assessment: Tolerated treatment well  Patient would benefit from continued PT  Patient demonstrated difficulty completing hamstring focused bridges  This muscle imbalance may be contributing to her knee pain  Patient is reporting decreased symptoms and ability to complete full shift at work without increase in pain  Patient was able to complete step-ups today without increase in pain and without resting  We are making progress and patient should continue to benefit from physical therapy  Plan: Progress treatment as tolerated  Precautions:  Lupus     Daily Treatment Diary     Manuals 7/26 8/1 8/15 8/20 8/22 8/27       PROM  RT, PT  RAT, hip add, hip abd, hams   NP Hamstrings       Mobs  Patellar, RT Patella, RT  Patella, RAT Patellar Mobs  Patellar Mobs       Taping      Fat Pad  Fat Pad        IASTM      RAT - Distal IT band        Exercise Diary              Bike             Recumbent Bike             Quad Sets 1x10 X10, 5" holds NP NP         SLR   2 5# 2x10 2 5# 2x10  3# 2x10       SL Hip ABD  2x10 1 5# 3x10 1 5# 3x10  3# 2x10       SL Hip Adduciton    0# 2x10         Clamshells  Red, 2x10 Red 2x10 Green 2x10  Green 3x10       Hamstring Curls             X-Walks  Red 20' x 2 Green 15'x4 NP  NP       Mini-Squats             Terminal Knee Extension  Green 2x10 NP NP  DC       Hamstring S             Prone Quad S             Single Leg Stance  30"x3 NP DC         Single Leg Stance Foam  30"x1 NP 30"x3  NP Step Up  x3 - pain Right leg lunging with increased WB  Right leg full step up x 15 Right Leg full step up and step down x 20 Right leg full step up and down x 30       Bridges    2x10 2x10 NP x30 full leg on ball / x   30 hamstring on ball       Wobble Board    Both Directions 30" x 5   NP       Lateral step down     x20 NP NP       Lunges on Bosu Ball     x20        Leg Press       55# 3x10       HEP Review RAT    RAT        Review of Foto / Re-Evaluation     RAT                                  Modalities             CP PRN

## 2018-08-29 ENCOUNTER — APPOINTMENT (OUTPATIENT)
Dept: PHYSICAL THERAPY | Facility: CLINIC | Age: 63
End: 2018-08-29
Payer: OTHER MISCELLANEOUS

## 2018-08-29 DIAGNOSIS — I10 BENIGN ESSENTIAL HYPERTENSION: ICD-10-CM

## 2018-08-29 RX ORDER — LISINOPRIL 20 MG/1
TABLET ORAL
Qty: 90 TABLET | Refills: 0 | Status: SHIPPED | OUTPATIENT
Start: 2018-08-29 | End: 2018-09-19

## 2018-08-30 ENCOUNTER — OFFICE VISIT (OUTPATIENT)
Dept: PHYSICAL THERAPY | Facility: CLINIC | Age: 63
End: 2018-08-30
Payer: OTHER MISCELLANEOUS

## 2018-08-30 DIAGNOSIS — M22.41 CHONDROMALACIA OF PATELLOFEMORAL JOINT, RIGHT: Primary | ICD-10-CM

## 2018-08-30 DIAGNOSIS — M22.2X1 PATELLOFEMORAL SYNDROME OF RIGHT KNEE: ICD-10-CM

## 2018-08-30 PROCEDURE — 97140 MANUAL THERAPY 1/> REGIONS: CPT | Performed by: PHYSICAL THERAPIST

## 2018-08-30 PROCEDURE — 97112 NEUROMUSCULAR REEDUCATION: CPT | Performed by: PHYSICAL THERAPIST

## 2018-08-30 PROCEDURE — 97110 THERAPEUTIC EXERCISES: CPT | Performed by: PHYSICAL THERAPIST

## 2018-08-30 NOTE — PROGRESS NOTES
Daily Note     Today's date: 2018  Patient name: Owen Drake  : 1955  MRN: [id number]  Referring provider: Mainor Rojas  Dx:   Encounter Diagnosis     ICD-10-CM    1  Chondromalacia of patellofemoral joint, right M22 41    2  Patellofemoral syndrome of right knee M22 2X1        Start Time: 93  Stop Time:   Total time in clinic (min): 54 minutes    Subjective:  "I was able to climb up my steps without crawling  I really didn't have any pain in my knee "      Objective: See treatment diary below      Assessment: Tolerated treatment well  Patient would benefit from continued PT  Patient has done well with therapy  She continues to report decreased symptoms in her knee and increased ability to perform functional activities at home and at work  We will look to continue strengthening her LE for continued increased functional performance  Plan: Progress treatment as tolerated  Precautions:  Lupus     Daily Treatment Diary     Manuals 7/26 8/1 8/15 8/20 8/22 8/27 8/30      PROM  RT, PT  RAT, hip add, hip abd, hams   NP Hamstrings Hamstrings       Mobs  Patellar, RT Patella, RT  Patella, RAT Patellar Mobs  Patellar Mobs NP      Taping      Fat Pad  Fat Pad  Fat Pad      IASTM      RAT - Distal IT band  NP      Exercise Diary              Bike             Recumbent Bike             Quad Sets 1x10 X10, 5" holds NP NP         SLR   2 5# 2x10 2 5# 2x10  3# 2x10 3# 3x10      SL Hip ABD  2x10 1 5# 3x10 1 5# 3x10  3# 2x10 3# 3x10      SL Hip Adduciton    0# 2x10         Clamshells  Red, 2x10 Red 2x10 Green 2x10  Green 3x10 Blue 3x10      Hamstring Curls             X-Walks  Red 20' x 2 Green 15'x4 NP  NP       Mini-Squats             Terminal Knee Extension  Green 2x10 NP NP  DC       Hamstring S             Prone Quad S             Single Leg Stance  30"x3 NP DC         Single Leg Stance Foam  30"x1 NP 30"x3  NP       Step Up  x3 - pain Right leg lunging with increased WB  Right leg full step up x 15 Right Leg full step up and step down x 20 Right leg full step up and down x 30 x30      Bridges    2x10 2x10 NP x30 full leg on ball / x   30 hamstring on ball x50 full leg on ball - unable to tolerate hamsrings today      Wobble Board    Both Directions 30" x 5   NP NP      Lateral step down     x20 NP NP NP      Lunges on Bosu Ball     x20        Leg Press       55# 3x10 80# 3x10      HEP Review RAT    RAT        Review of Foto / Re-Evaluation     RAT                                  Modalities             CP PRN

## 2018-09-06 ENCOUNTER — OFFICE VISIT (OUTPATIENT)
Dept: PHYSICAL THERAPY | Facility: CLINIC | Age: 63
End: 2018-09-06
Payer: OTHER MISCELLANEOUS

## 2018-09-06 DIAGNOSIS — M22.41 CHONDROMALACIA OF PATELLOFEMORAL JOINT, RIGHT: Primary | ICD-10-CM

## 2018-09-06 DIAGNOSIS — M22.2X1 PATELLOFEMORAL SYNDROME OF RIGHT KNEE: ICD-10-CM

## 2018-09-06 PROCEDURE — 97112 NEUROMUSCULAR REEDUCATION: CPT | Performed by: PHYSICAL THERAPIST

## 2018-09-06 PROCEDURE — 97110 THERAPEUTIC EXERCISES: CPT | Performed by: PHYSICAL THERAPIST

## 2018-09-06 PROCEDURE — 97140 MANUAL THERAPY 1/> REGIONS: CPT | Performed by: PHYSICAL THERAPIST

## 2018-09-06 NOTE — PROGRESS NOTES
Daily Note     Today's date: 2018  Patient name: Chapis Carrillo  : 1955  MRN: [id number]  Referring provider: Chris Bullard  Dx:   Encounter Diagnosis     ICD-10-CM    1  Chondromalacia of patellofemoral joint, right M22 41    2  Patellofemoral syndrome of right knee M22 2X1        Start Time: 162  Stop Time:   Total time in clinic (min): 60 minutes    Subjective:  "My knee is really tender and sore today  At work over the weekend I was kneeling a lot and I a lot of moving around "      Objective: See treatment diary below      Assessment: Tolerated treatment well  Patient would benefit from continued PT  Patient has been steadily improving with physical therapy  She has been able to attend more consistently and has had the results to show for it  Her pain continues to decrease and she is able to report increased functional performance  Her main complaints at this time are deep squats and ascending/descending stairs  Plan: Progress treatment as tolerated  Precautions:  Lupus     Daily Treatment Diary     Manuals 7/26 8/1 8/15 8/20 8/22 8/27 8/30 9/6     PROM  RT, PT  RAT, hip add, hip abd, hams   NP Hamstrings Hamstrings  Hamstrings, Patellar Tendon S      Mobs  Patellar, RT Patella, RT  Patella, RAT Patellar Mobs  Patellar Mobs NP NP     Taping      Fat Pad  Fat Pad  Fat Pad Fat Pad      IASTM      RAT - Distal IT band  NP Distal IT Band     Exercise Diary              Bike             Recumbent Bike             Quad Sets 1x10 X10, 5" holds NP NP         SLR   2 5# 2x10 2 5# 2x10  3# 2x10 3# 3x10 3# 3x10     SL Hip ABD  2x10 1 5# 3x10 1 5# 3x10  3# 2x10 3# 3x10 3# 3x10     SL Hip Adduciton    0# 2x10         Clamshells  Red, 2x10 Red 2x10 Green 2x10  Green 3x10 Blue 3x10 Blue 3x10     Hamstring Curls             X-Walks  Red 20' x 2 Green 15'x4 NP  NP       Mini-Squats             Terminal Knee Extension  Green 2x10 NP NP  DC       Hamstring S             Prone Quad S Single Leg Stance  30"x3 NP DC         Single Leg Stance Foam  30"x1 NP 30"x3  NP       Step Up  x3 - pain Right leg lunging with increased WB  Right leg full step up x 15 Right Leg full step up and step down x 20 Right leg full step up and down x 30 x30 x10     Bridges    2x10 2x10 NP x30 full leg on ball / x   30 hamstring on ball x50 full leg on ball - unable to tolerate hamsrings today x30 full leg / x15 hamstring     Wobble Board    Both Directions 30" x 5   NP NP NP     Lateral step down     x20 NP NP NP NP     Lunges on Bosu Ball     x20        Leg Press       55# 3x10 80# 3x10 NP     Squats         x30     HEP Review RAT    RAT        Review of Foto / Re-Evaluation     RAT                                  Modalities             CP PRN

## 2018-09-10 ENCOUNTER — OFFICE VISIT (OUTPATIENT)
Dept: PHYSICAL THERAPY | Facility: CLINIC | Age: 63
End: 2018-09-10
Payer: OTHER MISCELLANEOUS

## 2018-09-10 DIAGNOSIS — M22.2X1 PATELLOFEMORAL SYNDROME OF RIGHT KNEE: ICD-10-CM

## 2018-09-10 DIAGNOSIS — M22.41 CHONDROMALACIA OF PATELLOFEMORAL JOINT, RIGHT: Primary | ICD-10-CM

## 2018-09-10 PROCEDURE — 97140 MANUAL THERAPY 1/> REGIONS: CPT | Performed by: PHYSICAL THERAPIST

## 2018-09-10 PROCEDURE — 97112 NEUROMUSCULAR REEDUCATION: CPT | Performed by: PHYSICAL THERAPIST

## 2018-09-10 PROCEDURE — 97110 THERAPEUTIC EXERCISES: CPT | Performed by: PHYSICAL THERAPIST

## 2018-09-10 NOTE — PROGRESS NOTES
Daily Note     Today's date: 9/10/2018  Patient name: Zach Phelan  : 1955  MRN: [id number]  Referring provider: César Meade  Dx:   Encounter Diagnosis     ICD-10-CM    1  Chondromalacia of patellofemoral joint, right M22 41    2  Patellofemoral syndrome of right knee M22 2X1        Start Time: 1718  Stop Time: 1805  Total time in clinic (min): 47 minutes    Subjective:  "My knee feels really good  I didn't have any pain with it at work over the weekend "      Objective: See treatment diary below      Assessment: Tolerated treatment well  Patient would benefit from continued PT  Patient did not note any pain at all during her therapy session today  Step-ups were completed safely with proper form and no verbal cues  Patient is progressing well towards all of her goals and will be nearing a discharge soon  Plan: Progress treatment as tolerated  Precautions:  Lupus     Daily Treatment Diary     Manuals 7/26 8/1 8/15 8/20 8/22 8/27 8/30 9/6 9/10    PROM  RT, PT  RAT, hip add, hip abd, hams   NP Hamstrings Hamstrings  Hamstrings, Patellar Tendon S  NP    Mobs  Patellar, RT Patella, RT  Patella, RAT Patellar Mobs  Patellar Mobs NP NP Patellar Mobs     Taping      Fat Pad  Fat Pad  Fat Pad Fat Pad  NP    IASTM      RAT - Distal IT band  NP Distal IT Band NP    STM          Fat Pad STM     Exercise Diary              Bike             Recumbent Bike             Quad Sets 1x10 X10, 5" holds NP NP         SLR   2 5# 2x10 2 5# 2x10  3# 2x10 3# 3x10 3# 3x10 3# 3x12    SL Hip ABD  2x10 1 5# 3x10 1 5# 3x10  3# 2x10 3# 3x10 3# 3x10 3# 3x12    SL Hip Adduciton    0# 2x10         Clamshells  Red, 2x10 Red 2x10 Green 2x10  Green 3x10 Blue 3x10 Blue 3x10 Black 3x12    Hamstring Curls             X-Walks  Red 20' x 2 Green 15'x4 NP  NP       Mini-Squats             Terminal Knee Extension  Green 2x10 NP NP  DC       Hamstring S             Prone Quad S             Single Leg Stance  30"x3 NP DC Single Leg Stance Foam  30"x1 NP 30"x3  NP       Step Up  x3 - pain Right leg lunging with increased WB  Right leg full step up x 15 Right Leg full step up and step down x 20 Right leg full step up and down x 30 x30 x10 x30    Bridges    2x10 2x10 NP x30 full leg on ball / x   30 hamstring on ball x50 full leg on ball - unable to tolerate hamsrings today x30 full leg / x15 hamstring x20 glutes / x 20 hamstring    Wobble Board    Both Directions 30" x 5   NP NP NP NP    Lateral step down     x20 NP NP NP NP     Lunges on Bosu Ball     x20        Leg Press       55# 3x10 80# 3x10 NP 80# 3x10    Squats         x30     HEP Review RAT    RAT        Review of Foto / Re-Evaluation     RAT                                  Modalities             CP PRN

## 2018-09-12 ENCOUNTER — OFFICE VISIT (OUTPATIENT)
Dept: PHYSICAL THERAPY | Facility: CLINIC | Age: 63
End: 2018-09-12
Payer: OTHER MISCELLANEOUS

## 2018-09-12 DIAGNOSIS — M22.2X1 PATELLOFEMORAL SYNDROME OF RIGHT KNEE: ICD-10-CM

## 2018-09-12 DIAGNOSIS — M22.41 CHONDROMALACIA OF PATELLOFEMORAL JOINT, RIGHT: Primary | ICD-10-CM

## 2018-09-12 PROCEDURE — 97112 NEUROMUSCULAR REEDUCATION: CPT | Performed by: PHYSICAL THERAPIST

## 2018-09-12 PROCEDURE — 97110 THERAPEUTIC EXERCISES: CPT | Performed by: PHYSICAL THERAPIST

## 2018-09-12 PROCEDURE — 97140 MANUAL THERAPY 1/> REGIONS: CPT | Performed by: PHYSICAL THERAPIST

## 2018-09-12 NOTE — PROGRESS NOTES
Daily Note     Today's date: 2018  Patient name: Becca Rowland  : 1955  MRN: [id number]  Referring provider: Ghassan Rodriguez  Dx:   Encounter Diagnosis     ICD-10-CM    1  Chondromalacia of patellofemoral joint, right M22 41    2  Patellofemoral syndrome of right knee M22 2X1        Start Time: 173  Stop Time:   Total time in clinic (min): 43 minutes    Subjective:  "I'm feeling really good  The most pain I've had is when I'm standing for over 4 hours on my shift at work on the weekend  I'm not allowed to sit down so it gets hard at the end of the day "      Objective: See treatment diary below      Assessment: Tolerated treatment well  Patient would benefit from continued PT  Patient tolerated high level moves very well today without any complaints of pain  No pain with bosu ball lunges, steps, or squats  Patient progressing well towards discharge  Plan: Progress treatment as tolerated  Precautions:  Lupus     Daily Treatment Diary     Manuals 7/26 8/1 8/15 8/20 8/22 8/27 8/30 9/6 9/10 9/12   PROM  RT, PT  RAT, hip add, hip abd, hams   NP Hamstrings Hamstrings  Hamstrings, Patellar Tendon S  NP Hamstrings   Mobs  Patellar, RT Patella, RT  Patella, RAT Patellar Mobs  Patellar Mobs NP NP Patellar Mobs  Patellar Mobs    Taping      Fat Pad  Fat Pad  Fat Pad Fat Pad  NP NP   IASTM      RAT - Distal IT band  NP Distal IT Band NP NP   STM          Fat Pad STM  NP   Exercise Diary              Bike             Recumbent Bike             Quad Sets 1x10 X10, 5" holds NP NP         SLR   2 5# 2x10 2 5# 2x10  3# 2x10 3# 3x10 3# 3x10 3# 3x12 4# 3x10   SL Hip ABD  2x10 1 5# 3x10 1 5# 3x10  3# 2x10 3# 3x10 3# 3x10 3# 3x12 4# 3x10   SL Hip Adduciton    0# 2x10         Clamshells  Red, 2x10 Red 2x10 Green 2x10  Green 3x10 Blue 3x10 Blue 3x10 Black 3x12 Black 3x12   Hamstring Curls             X-Walks  Red 20' x 2 Green 15'x4 NP  NP       Mini-Squats             Terminal Knee Extension Green 2x10 NP NP  DC       Hamstring S             Prone Quad S             Single Leg Stance  30"x3 NP DC         Single Leg Stance Foam  30"x1 NP 30"x3  NP       Step Up  x3 - pain Right leg lunging with increased WB  Right leg full step up x 15 Right Leg full step up and step down x 20 Right leg full step up and down x 30 x30 x10 x30 x45   Bridges    2x10 2x10 NP x30 full leg on ball / x   30 hamstring on ball x50 full leg on ball - unable to tolerate hamsrings today x30 full leg / x15 hamstring x20 glutes / x 20 hamstring NP   Wobble Board    Both Directions 30" x 5   NP NP NP NP    Lateral step down     x20 NP NP NP NP     Lunges on Bosu Ball     x20     x30   Bosu ball step up          x30   Leg Press       55# 3x10 80# 3x10 NP 80# 3x10 80# 3x12   Squats         x30     HEP Review RAT    RAT        Review of Foto / Re-Evaluation     RAT                                  Modalities             CP PRN

## 2018-09-17 ENCOUNTER — OFFICE VISIT (OUTPATIENT)
Dept: PHYSICAL THERAPY | Facility: CLINIC | Age: 63
End: 2018-09-17
Payer: OTHER MISCELLANEOUS

## 2018-09-17 DIAGNOSIS — M22.41 CHONDROMALACIA OF PATELLOFEMORAL JOINT, RIGHT: Primary | ICD-10-CM

## 2018-09-17 DIAGNOSIS — M22.2X1 PATELLOFEMORAL SYNDROME OF RIGHT KNEE: ICD-10-CM

## 2018-09-17 PROCEDURE — 97110 THERAPEUTIC EXERCISES: CPT | Performed by: PHYSICAL THERAPIST

## 2018-09-17 PROCEDURE — 97112 NEUROMUSCULAR REEDUCATION: CPT | Performed by: PHYSICAL THERAPIST

## 2018-09-17 PROCEDURE — 97140 MANUAL THERAPY 1/> REGIONS: CPT | Performed by: PHYSICAL THERAPIST

## 2018-09-17 NOTE — PROGRESS NOTES
Daily Note     Today's date: 2018  Patient name: Zach Phelan  : 1955  MRN: [id number]  Referring provider: César Meade  Dx:   Encounter Diagnosis     ICD-10-CM    1  Chondromalacia of patellofemoral joint, right M22 41    2  Patellofemoral syndrome of right knee M22 2X1        Start Time:   Stop Time:   Total time in clinic (min): 44 minutes    Subjective:  "My knee's a little tender today  It felt pretty good over the weekend at my job  I tried to take the brace off a bit, but I had to take it back on  I can't sit at work so it makes it really hard  I tried to pivot at work and I heard a pop in my knee  I think that's where it's tender at"      Objective: See treatment diary below      Assessment: Tolerated treatment well  Patient demonstrated fatigue post treatment  Patient was able to report fatigue s/p session today  There was some slight tenderness to the lateral joint line and the patient was able to note some tenderness during [place]  Patient has not had any high reports of pain and has been able to complete her jobs without any decrease in function  At this time, the patient will discharge from physical therapy at her next visit  Plan: Potential discharge next visit  Progress treatment as tolerated  Precautions:  Lupus     Daily Treatment Diary     Manuals 9/17 8/1 8/15 8/20 8/22 8/27 8/30 9/6 9/10 9/12   PROM NP RT, PT  RAT, hip add, hip abd, hams   NP Hamstrings Hamstrings  Hamstrings, Patellar Tendon S  NP Hamstrings   Mobs NP Patellar, RT Patella, RT  Patella, RAT Patellar Mobs  Patellar Mobs NP NP Patellar Mobs  Patellar Mobs    Taping      Fat Pad  Fat Pad  Fat Pad Fat Pad  NP NP   IASTM  Laeral knee joint line    RAT - Distal IT band  NP Distal IT Band NP NP   STM          Fat Pad STM  NP   Exercise Diary              Bike             Recumbent Bike             IT Band S 10" x 5            Quad Sets  X10, 5" holds NP NP         SLR NP  2 5# 2x10 2 5# 2x10  3# 2x10 3# 3x10 3# 3x10 3# 3x12 4# 3x10   SL Hip ABD NP 2x10 1 5# 3x10 1 5# 3x10  3# 2x10 3# 3x10 3# 3x10 3# 3x12 4# 3x10   Clamshells Black 3x12 Red, 2x10 Red 2x10 Green 2x10  Green 3x10 Blue 3x10 Blue 3x10 Black 3x12 Black 3x12   Hamstring Curls             X-Walks  Red 20' x 2 Green 15'x4 NP  NP       Mini-Squats             Terminal Knee Extension  Green 2x10 NP NP  DC       Hamstring S             Prone Quad S             Single Leg Stance  30"x3 NP DC         Single Leg Stance Foam 30" x 3 30"x1 NP 30"x3  NP       Step Up x30 x3 - pain Right leg lunging with increased WB  Right leg full step up x 15 Right Leg full step up and step down x 20 Right leg full step up and down x 30 x30 x10 x30 x45   Bridges  x30 glutes / x 20 hamstrings w/ rollouts  2x10 2x10 NP x30 full leg on ball / x   30 hamstring on ball x50 full leg on ball - unable to tolerate hamsrings today x30 full leg / x15 hamstring x20 glutes / x 20 hamstring NP   Wobble Board    Both Directions 30" x 5   NP NP NP NP    Lateral step down     x20 NP NP NP NP     Lunges on Bosu Ball     x20     x30   Bosu ball step up          x30   Leg Press  100# 3x10     55# 3x10 80# 3x10 NP 80# 3x10 80# 3x12   Squats         x30     HEP Review RAT    RAT        Review of Foto / Re-Evaluation     RAT                                  Modalities             CP PRN

## 2018-09-19 ENCOUNTER — OFFICE VISIT (OUTPATIENT)
Dept: FAMILY MEDICINE CLINIC | Facility: CLINIC | Age: 63
End: 2018-09-19
Payer: COMMERCIAL

## 2018-09-19 ENCOUNTER — EVALUATION (OUTPATIENT)
Dept: PHYSICAL THERAPY | Facility: CLINIC | Age: 63
End: 2018-09-19
Payer: OTHER MISCELLANEOUS

## 2018-09-19 VITALS
SYSTOLIC BLOOD PRESSURE: 140 MMHG | BODY MASS INDEX: 37.13 KG/M2 | TEMPERATURE: 97.6 F | HEIGHT: 62 IN | WEIGHT: 201.8 LBS | HEART RATE: 72 BPM | RESPIRATION RATE: 16 BRPM | DIASTOLIC BLOOD PRESSURE: 78 MMHG

## 2018-09-19 DIAGNOSIS — I10 BENIGN ESSENTIAL HYPERTENSION: ICD-10-CM

## 2018-09-19 DIAGNOSIS — M22.41 CHONDROMALACIA OF PATELLOFEMORAL JOINT, RIGHT: Primary | ICD-10-CM

## 2018-09-19 DIAGNOSIS — M22.2X1 PATELLOFEMORAL SYNDROME OF RIGHT KNEE: ICD-10-CM

## 2018-09-19 DIAGNOSIS — E78.5 HYPERLIPIDEMIA, UNSPECIFIED HYPERLIPIDEMIA TYPE: Primary | ICD-10-CM

## 2018-09-19 PROCEDURE — 97110 THERAPEUTIC EXERCISES: CPT | Performed by: PHYSICAL THERAPIST

## 2018-09-19 PROCEDURE — 99213 OFFICE O/P EST LOW 20 MIN: CPT | Performed by: NURSE PRACTITIONER

## 2018-09-19 PROCEDURE — G8991 OTHER PT/OT GOAL STATUS: HCPCS | Performed by: PHYSICAL THERAPIST

## 2018-09-19 PROCEDURE — 97112 NEUROMUSCULAR REEDUCATION: CPT | Performed by: PHYSICAL THERAPIST

## 2018-09-19 PROCEDURE — G8992 OTHER PT/OT  D/C STATUS: HCPCS | Performed by: PHYSICAL THERAPIST

## 2018-09-19 RX ORDER — LISINOPRIL 20 MG/1
20 TABLET ORAL DAILY
Qty: 90 TABLET | Refills: 1 | Status: SHIPPED | OUTPATIENT
Start: 2018-09-19 | End: 2020-03-25 | Stop reason: SDUPTHER

## 2018-09-19 RX ORDER — ATORVASTATIN CALCIUM 20 MG/1
20 TABLET, FILM COATED ORAL DAILY
Qty: 90 TABLET | Refills: 1 | Status: SHIPPED | OUTPATIENT
Start: 2018-09-19 | End: 2019-02-22 | Stop reason: SDUPTHER

## 2018-09-19 NOTE — PROGRESS NOTES
PT Discharge    Today's date: 2018  Patient name: Pedro Phillips  : 1955  MRN: [id number]  Referring provider: Renay Flaherty  Dx:   Encounter Diagnosis     ICD-10-CM    1  Chondromalacia of patellofemoral joint, right M22 41    2  Patellofemoral syndrome of right knee M22 2X1        Start Time: 1800  Stop Time: 1845  Total time in clinic (min): 45 minutes    Assessment  Impairments: pain with function and weight-bearing intolerance    Assessment details: Pedro Phillips is a 63 y o  female who has attended 12 physical therapy visits and has progressed very well  She has been able to meet all but one of her goals which she partially met  Her range of motion and strength of the right knee and lower extremity have improved since her initial evaluation  Patient's function has increased as she is now able to stand for 5+ hours before feeling discomfort in her knee, has been able to ascend and descends steps without pain, and has been able to perform squats with limited to no pain  At this time, the patient has shown independence with her HEP and agrees with and understands her discharge  The patient will discharge to home with her HEP  Understanding of Dx/Px/POC: good   Prognosis: good    Goals  Impairment Goals:  1  Patient will decrease complaints of pain to 3/10 at worst in 2 weeks - PARTIALLY MET   2  Patient will increase hip abduction MMT to 4/5 in 4 weeks - MET  3  Patient will increase right knee extension to 0 degrees in 4 weeks - MET     Functional Goals:  1  Patient will be independent with HEP in 2 weeks - MET   2  Patient will ascend/descend flight of stairs without crawling in 4 weeks - MET  3  Patient will sleep for full 8 hours without pain in 4 weeks - MET   4    Patient will complete full 8 hour work shift standing with minimal complaints of pain in 4 weeks - MET         Plan  Patient would benefit from: skilled physical therapy  Planned modality interventions: electrical stimulation/Russian stimulation, cryotherapy and TENS  Planned therapy interventions: abdominal trunk stabilization, flexibility, functional ROM exercises, home exercise program, work reintegration, transfer training, therapeutic exercise, strengthening, stretching, neuromuscular re-education, patient education, ADL retraining, muscle pump exercises and balance/weight bearing training  Treatment plan discussed with: patient        Subjective Evaluation    History of Present Illness  Mechanism of injury: Patient reports that she is 65-70% improved  Patient reports that she is not "hurting"  Patient has not missed a day of work and reports that her function has improved overall  She does state that it is hard to give a number on her progress due to her Lupus and sometimes is unable to discern between her knee vs  Lupus     Pain Location:  Lateral patella  Occupation:   at [employer] weekdays - primarily sedentary, [place] on weekends - primarily on feet   Prior Functional Limitations:  No limitations prior   AGG: Standing for more than 5+ hours, occasional pain with steps   Ease:  Relaxing and elevating  Patient Goals:  "I want to be able to sleep a full night and I want to walk up the steps without having to crawl "  Pain  Current pain ratin  At best pain ratin  At worst pain ratin (Varies 5-8; patient rpeorts it is very hard ot rate it  due to her diagnosis of Lupus and is unable to rate if knee vs  lupus)  Quality: throbbing ("Feels like I got punched in my leg and it's just bruised")          Objective     Tenderness     Right Knee   No tenderness in the inferior patella       Active Range of Motion   Left Knee   Flexion: 128 degrees   Extension: 0 degrees     Right Knee   Flexion: 132 degrees   Extension: 0 degrees     Strength/Myotome Testing     Right Hip   Planes of Motion   Flexion: 4+  Abduction: 4+    Left Knee   Flexion: 4+  Extension: 4+    Right Knee   Flexion: 4+  Extension: 4+    Tests     Right Knee   Positive Thessaly's test at 5 degrees and Thessaly's test at 20 degrees  Negative active quad, lateral Kim and medial Kim  Flowsheet Rows      Most Recent Value   PT/OT G-Codes   Current Score  65   Projected Score  67   Assessment Type  Discharge   G code set  Other PT/OT Primary   Other PT Primary Goal Status ()  [address]   Other PT Primary Discharge Status ()  CJ          Precautions:  Lupus     Daily Treatment Diary     Manuals 9/17 9/19 8/15 8/20 8/22 8/27 8/30 9/6 9/10 9/12   PROM NP NP  RAT, hip add, hip abd, hams   NP Hamstrings Hamstrings  Hamstrings, Patellar Tendon S  NP Hamstrings   Mobs NP NP Patella, RT  Patella, RAT Patellar Mobs  Patellar Mobs NP NP Patellar Mobs  Patellar Mobs    Taping      Fat Pad  Fat Pad  Fat Pad Fat Pad  NP NP   IASTM  Laeral knee joint line NP   RAT - Distal IT band  NP Distal IT Band NP NP   STM          Fat Pad STM  NP   Exercise Diary              Bike             Recumbent Bike             IT Band S 10" x 5            Quad Sets  NP NP NP         SLR NP  2 5# 2x10 2 5# 2x10  3# 2x10 3# 3x10 3# 3x10 3# 3x12 4# 3x10   SL Hip ABD NP NP 1 5# 3x10 1 5# 3x10  3# 2x10 3# 3x10 3# 3x10 3# 3x12 4# 3x10   Clamshells Black 3x12 Black x10 Red 2x10 Green 2x10  Green 3x10 Blue 3x10 Blue 3x10 Black 3x12 Black 3x12   Hamstring Curls             X-Walks  NP Green 15'x4 NP  NP       Mini-Squats             Terminal Knee Extension  NP NP NP  DC       Hamstring S             Prone Quad S             Single Leg Stance  NP NP DC         Single Leg Stance Foam 30" x 3 NP NP 30"x3  NP       Step Up x30 NP Right leg lunging with increased WB  Right leg full step up x 15 Right Leg full step up and step down x 20 Right leg full step up and down x 30 x30 x10 x30 x45   Bridges  x30 glutes / x 20 hamstrings w/ rollouts  2x10 2x10 NP x30 full leg on ball / x   30 hamstring on ball x50 full leg on ball - unable to tolerate hamsrings today x30 full leg / x15 hamstring x20 glutes / x 20 hamstring NP   Wobble Board    Both Directions 30" x 5   NP NP NP NP    Lateral step down     x20 NP NP NP NP     Lunges on Bosu Ball     x20     x30   Bosu ball step up          x30   Leg Press  100# 3x10     55# 3x10 80# 3x10 NP 80# 3x10 80# 3x12   Squats         x30     HEP Review RAT RAT   RAT        Review of Foto / Re-Evaluation / Discharge  RAT   RAT                                  Modalities             CP PRN

## 2018-09-19 NOTE — PROGRESS NOTES
Chief Complaint   Patient presents with    Follow-up     Bp check /med refill         Patient ID: Mali Pickard is a 66 y o  female  Patient is here today for re-eval of elevated blood pressure at the previous visit  She has a known history of hypertension  Patient's initial reading in the office today is stage I hypertension, she was stage I hypertension at the previous visit  She presents with her home blood pressure log today which shows average systolic blood pressure of 130 8 to 135 over 90s  She reports compliance with her blood pressure medications and denies cardiovascular symptoms of concern at the time of the visit  Past Medical History:   Diagnosis Date    Arthropathy     ONSET: [id number]    Cellulitis     ONSET: 26YBO3788    Costochondritis     ONSET: 51YDM9911    Dermatitis     ONSET: 20LGF4827    Hemorrhagic detachment of retinal pigment epithelium     ONSET: 93JPQ5904    Hypertension     LAST ASSESSED: 11RYG2466    Ingrown nail     LAST ASSESSED: 47UBO9087    Labyrinthitis     ONSET: 88UKD1605    Lymphadenopathy     ONSET: 34APG8373    Myalgia     ONSET: 14QAT8238    Myositis     ONSET: 56KUB0988    Nonallopathic lesion     ONSET: 30EOI8612    Shortness of breath     ONSET: 52OEF9087    Systemic lupus erythematosus (HCC)     LAST ASSESSED: 85BAV4016    Tinea corporis     ONSET: 89FON1098    Vertigo     LAST ASSESSED: 35LBX8449       History reviewed  No pertinent surgical history      Patient Active Problem List   Diagnosis    Benign essential hypertension    Edema extremities    Hyperlipidemia    Impaired fasting glucose    MTHFR mutation (HCC)    Osteopenia    Systemic lupus erythematosus (HCC)    Thrombocytosis (HCC)    Vitamin D deficiency    Plantar wart    Serum potassium elevated    Breast cancer screening       Family History   Problem Relation Age of Onset    Stomach cancer Mother         MALIGNANT NEOPLASM    Hypertension Father     Coronary artery disease Sister     Coronary artery disease Brother     Other Brother         CARDIAC PACEMAKER, CARDIOMEGALY       Immunization History   Administered Date(s) Administered    Influenza Quadrivalent Preservative Free 3 years and older IM 09/25/2014    Influenza TIV (IM) 09/05/2009, 09/23/2010, 10/01/2011, 09/26/2013, 09/20/2017    Td (adult), adsorbed 07/14/1997    Tdap 05/26/2016       Allergies   Allergen Reactions    Other      Reaction Date: [id number]; Annotation - [id number]: rash   madlpn adhesive tape    Penicillins Rash     Reaction Date: [id number]; Annotation - [id number]: dayron       Current Outpatient Prescriptions   Medication Sig Dispense Refill    aspirin (ECOTRIN LOW STRENGTH) 81 mg EC tablet Take 81 mg by mouth daily      atorvastatin (LIPITOR) 20 mg tablet Take 1 tablet (20 mg total) by mouth daily 90 tablet 1    Cholecalciferol (VITAMIN D3) 3000 units TABS Take 1,000 Units by mouth 2 (two) times a day        Coenzyme Q10 (COQ10) 200 MG CAPS Take 1 capsule by mouth daily      ezetimibe (ZETIA) 10 mg tablet TAKE 1 TABLET (10 MG TOTAL) BY MOUTH DAILY 90 tablet 1    lisinopril (ZESTRIL) 20 mg tablet Take 1 tablet (20 mg total) by mouth daily 90 tablet 1    metFORMIN (GLUCOPHAGE) 500 mg tablet TAKE 1 TABLET BY MOUTH TWICE A  tablet 1    Omega-3 Fatty Acids (FISH OIL) 1200 MG CAPS Take 2 capsules by mouth 2 (two) times a day       No current facility-administered medications for this visit  Social History     Social History    Marital status: Single     Spouse name: N/A    Number of children: N/A    Years of education: N/A     Social History Main Topics    Smoking status: Never Smoker    Smokeless tobacco: Never Used    Alcohol use Yes      Comment: rare    Drug use: No    Sexual activity: Not Asked     Other Topics Concern    None     Social History Narrative    DAILY COLA CONSUMPTION (1 CANS/DAY)       Review of Systems   Constitutional: Negative      HENT: Negative  Eyes: Negative  Respiratory: Negative  Cardiovascular: Negative  Gastrointestinal: Negative  Endocrine: Negative  Genitourinary: Negative  Musculoskeletal: Negative  Skin: Negative  Allergic/Immunologic: Negative  Neurological: Negative  Hematological: Negative  Psychiatric/Behavioral: Negative  Objective:    /78 (BP Location: Left arm, Patient Position: Sitting, Cuff Size: Standard)   Pulse 72   Temp 97 6 °F (36 4 °C)   Resp 16   Ht 5' 2" (1 575 m)   Wt 91 5 kg (201 lb 12 8 oz)   BMI 36 91 kg/m²        Physical Exam   Constitutional: She is oriented to person, place, and time  She appears well-developed and well-nourished  No distress  HENT:   Head: Normocephalic  Right Ear: External ear normal    Left Ear: External ear normal    Eyes: Conjunctivae are normal  No scleral icterus  Neck: No JVD present  Cardiovascular: Normal rate, regular rhythm and normal heart sounds  Pulmonary/Chest: Effort normal and breath sounds normal    Musculoskeletal: She exhibits no edema  Neurological: She is alert and oriented to person, place, and time  Skin: Skin is warm and dry  Psychiatric: She has a normal mood and affect  Assessment/Plan:    No problem-specific Assessment & Plan notes found for this encounter  Diagnoses and all orders for this visit:    Hyperlipidemia, unspecified hyperlipidemia type  Comments:  Atorvastatin renewed  Orders:  -     atorvastatin (LIPITOR) 20 mg tablet; Take 1 tablet (20 mg total) by mouth daily    Benign essential hypertension  Comments:  Increase in lisinopril today  Blood work in a month  Repeat BP check in six weeks  Continue home monitoring  Orders:  -     lisinopril (ZESTRIL) 20 mg tablet; Take 1 tablet (20 mg total) by mouth daily  -     Basic metabolic panel; Future            There are no Patient Instructions on file for this visit                    Neena Jimenez

## 2018-09-25 ENCOUNTER — OFFICE VISIT (OUTPATIENT)
Dept: OBGYN CLINIC | Facility: CLINIC | Age: 63
End: 2018-09-25
Payer: OTHER MISCELLANEOUS

## 2018-09-25 VITALS
BODY MASS INDEX: 37.25 KG/M2 | HEART RATE: 80 BPM | DIASTOLIC BLOOD PRESSURE: 85 MMHG | WEIGHT: 202.4 LBS | HEIGHT: 62 IN | SYSTOLIC BLOOD PRESSURE: 148 MMHG

## 2018-09-25 DIAGNOSIS — M22.2X1 PATELLOFEMORAL DISORDER OF RIGHT KNEE: Primary | ICD-10-CM

## 2018-09-25 PROCEDURE — 99213 OFFICE O/P EST LOW 20 MIN: CPT | Performed by: ORTHOPAEDIC SURGERY

## 2018-09-25 NOTE — PROGRESS NOTES
Assessment/Plan:  1  Patellofemoral disorder of right knee       Paula Leo is a pleasant 66year old female with right knee patellofemoral chondromalacia that has responded tremendously well to conservative treatment and is currently asymptomatic  Therefore, there is no need for further workup or escalation of care  She should continue with her home exercise program, anti-inflammatories as needed, and her knee brace while active  She can now return on an as needed basis  She can return to work without restrictions  All questions addressed  Subjective: Right knee follow up    Patient ID: Zulema Haas is a 66 y o  female  Paula Leo is a very pleasant 66year old female following up 2 months from her initial appointment for chondromalacia of the right patella  She reports that she has seen tremendous relief from mobic, physical therapy, and the lateral J brace that she wears at work  She has an occasional twinge but is not limited in her ability to perform ADLs or duties at work  Review of Systems   Constitutional: Negative  HENT: Negative  Eyes: Negative  Respiratory: Negative  Cardiovascular: Negative  Gastrointestinal: Negative  Endocrine: Negative  Genitourinary: Negative  Musculoskeletal: Negative  Skin: Negative  Allergic/Immunologic: Negative  Neurological: Negative  Hematological: Negative  Psychiatric/Behavioral: Negative            Past Medical History:   Diagnosis Date    Arthropathy     ONSET: 08PHD8134    Cellulitis     ONSET: 60VQH1277    Costochondritis     ONSET: 27PJH9916    Dermatitis     ONSET: 11EZT4260    Hemorrhagic detachment of retinal pigment epithelium     ONSET: 00NCD6667    Hypertension     LAST ASSESSED: 46LHV2091    Ingrown nail     LAST ASSESSED: 88SAN5819    Labyrinthitis     ONSET: 74HDR1237    Lymphadenopathy     ONSET: 10OCT2007    Myalgia     ONSET: 88QYP0172    Myositis     ONSET: 43QUE4210    Nonallopathic lesion     ONSET: 15ADZ8371    Shortness of breath     ONSET: 48WHE7865    Systemic lupus erythematosus ([facility] [address] )     LAST ASSESSED: 16VAF5165    Tinea corporis     ONSET: 65VKU3109    Vertigo     LAST ASSESSED: 18BDE2170       History reviewed  No pertinent surgical history  Family History   Problem Relation Age of Onset    Stomach cancer Mother         MALIGNANT NEOPLASM    Hypertension Father     Coronary artery disease Sister     Coronary artery disease Brother     Other Brother         CARDIAC PACEMAKER, CARDIOMEGALY       Social History     Occupational History    Not on file  Social History Main Topics    Smoking status: Never Smoker    Smokeless tobacco: Never Used    Alcohol use Yes      Comment: rare    Drug use: No    Sexual activity: Not on file         Current Outpatient Prescriptions:     aspirin (ECOTRIN LOW STRENGTH) 81 mg EC tablet, Take 81 mg by mouth daily, Disp: , Rfl:     atorvastatin (LIPITOR) 20 mg tablet, Take 1 tablet (20 mg total) by mouth daily, Disp: 90 tablet, Rfl: 1    Cholecalciferol (VITAMIN D3) 3000 units TABS, Take 1,000 Units by mouth 2 (two) times a day  , Disp: , Rfl:     Coenzyme Q10 (COQ10) 200 MG CAPS, Take 1 capsule by mouth daily, Disp: , Rfl:     ezetimibe (ZETIA) 10 mg tablet, TAKE 1 TABLET (10 MG TOTAL) BY MOUTH DAILY, Disp: 90 tablet, Rfl: 1    lisinopril (ZESTRIL) 20 mg tablet, Take 1 tablet (20 mg total) by mouth daily, Disp: 90 tablet, Rfl: 1    metFORMIN (GLUCOPHAGE) 500 mg tablet, TAKE 1 TABLET BY MOUTH TWICE A DAY, Disp: 180 tablet, Rfl: 1    Omega-3 Fatty Acids (FISH OIL) 1200 MG CAPS, Take 2 capsules by mouth 2 (two) times a day, Disp: , Rfl:     Allergies   Allergen Reactions    Other      Reaction Date: [id number]; Annotation - [id number]: rash   madlpn adhesive tape    Penicillins Rash     Reaction Date: [id number];  Annotation - [id number]: jjw       Objective:  Vitals:    09/25/18 1700   BP: 148/85   Pulse: 80       Body mass index is 37 02 kg/m²  Right Knee Exam     Tenderness   The patient is experiencing no tenderness  Range of Motion   Extension:  0 normal   Flexion:  130 normal     Muscle Strength     The patient has normal right knee strength  Tests   Kim:  Medial - negative Lateral - negative  Lachman:  Anterior - negative      Drawer:       Anterior - negative      Varus: negative  Valgus: negative  Patellar Apprehension: negative    Other   Erythema: absent  Scars: absent  Sensation: normal  Pulse: present  Swelling: none  Other tests: no effusion present    Comments:  - PFG  Mild crepitus with passive ROM  Stable collateral and cruciate ligaments  Grossly NVI          Observations     Right Knee   Negative for effusion  Physical Exam   Constitutional: She is oriented to person, place, and time  She appears well-developed and well-nourished  Body mass index is 37 02 kg/m²  HENT:   Head: Normocephalic and atraumatic  Eyes: EOM are normal    Neck: Normal range of motion  Cardiovascular: Intact distal pulses  Pulmonary/Chest: Effort normal    Musculoskeletal:        Right knee: She exhibits no effusion  See ortho exam   Neurological: She is alert and oriented to person, place, and time  Skin: Skin is warm and dry  Psychiatric: She has a normal mood and affect   Her behavior is normal  Judgment and thought content normal

## 2019-02-22 DIAGNOSIS — E78.5 HYPERLIPIDEMIA, UNSPECIFIED HYPERLIPIDEMIA TYPE: ICD-10-CM

## 2019-02-22 RX ORDER — ATORVASTATIN CALCIUM 20 MG/1
TABLET, FILM COATED ORAL
Qty: 90 TABLET | Refills: 0 | Status: SHIPPED | OUTPATIENT
Start: 2019-02-22 | End: 2020-03-25 | Stop reason: SDUPTHER

## 2019-03-27 DIAGNOSIS — R73.01 IMPAIRED FASTING GLUCOSE: ICD-10-CM

## 2019-08-09 LAB
LEFT EYE DIABETIC RETINOPATHY: NORMAL
RIGHT EYE DIABETIC RETINOPATHY: NORMAL

## 2019-09-07 ENCOUNTER — APPOINTMENT (OUTPATIENT)
Dept: RADIOLOGY | Facility: CLINIC | Age: 64
End: 2019-09-07
Payer: OTHER MISCELLANEOUS

## 2019-09-07 DIAGNOSIS — S69.92XA INJURY OF LEFT HAND, INITIAL ENCOUNTER: ICD-10-CM

## 2019-09-07 PROCEDURE — 73130 X-RAY EXAM OF HAND: CPT

## 2019-09-07 PROCEDURE — 73110 X-RAY EXAM OF WRIST: CPT

## 2019-10-01 DIAGNOSIS — R73.01 IMPAIRED FASTING GLUCOSE: ICD-10-CM

## 2020-01-04 ENCOUNTER — OFFICE VISIT (OUTPATIENT)
Dept: URGENT CARE | Facility: CLINIC | Age: 65
End: 2020-01-04
Payer: COMMERCIAL

## 2020-01-04 VITALS
WEIGHT: 210 LBS | BODY MASS INDEX: 38.64 KG/M2 | SYSTOLIC BLOOD PRESSURE: 188 MMHG | HEART RATE: 110 BPM | DIASTOLIC BLOOD PRESSURE: 90 MMHG | TEMPERATURE: 101 F | HEIGHT: 62 IN | OXYGEN SATURATION: 95 % | RESPIRATION RATE: 20 BRPM

## 2020-01-04 DIAGNOSIS — J11.1 INFLUENZA: Primary | ICD-10-CM

## 2020-01-04 PROCEDURE — 99213 OFFICE O/P EST LOW 20 MIN: CPT | Performed by: PHYSICIAN ASSISTANT

## 2020-01-04 RX ORDER — OSELTAMIVIR PHOSPHATE 75 MG/1
75 CAPSULE ORAL 2 TIMES DAILY
Qty: 10 CAPSULE | Refills: 0 | Status: SHIPPED | OUTPATIENT
Start: 2020-01-04 | End: 2020-01-09

## 2020-01-04 RX ORDER — ONDANSETRON 4 MG/1
4 TABLET, FILM COATED ORAL EVERY 8 HOURS PRN
Qty: 20 TABLET | Refills: 0 | Status: SHIPPED | OUTPATIENT
Start: 2020-01-04 | End: 2020-03-25

## 2020-01-04 NOTE — PATIENT INSTRUCTIONS
Most likely influenza, discussed send out test, patient declined  Rx tamiflu twice daily x 5 days sent via EMR  Rx zofran sent via EMR as needed for nausea/vomiting related to tamiflu  Recommend tylenol/ibuprofen as needed for pain/fever  Rest, fluids, and supportive care  Follow up with PCP in 3-5 days  Proceed to  ER if symptoms worsen  Influenza   AMBULATORY CARE:   Influenza  (the flu) is an infection caused by the influenza virus  The flu is easily spread when an infected person coughs, sneezes, or has close contact with others  You may be able to spread the flu to others for 1 week or longer after signs or symptoms appear  Common signs and symptoms include the following:   · Fever and chills    · Headaches, body aches, and muscle or joint pain    · Cough, runny nose, and sore throat    · Loss of appetite, nausea, vomiting, or diarrhea    · Tiredness    · Trouble breathing  Call 911 for any of the following:   · You have trouble breathing, and your lips look purple or blue  · You have a seizure  Seek care immediately if:   · You are dizzy, or you are urinating less or not at all  · You have a headache with a stiff neck, and you feel tired or confused  · You have new pain or pressure in your chest     · Your symptoms, such as shortness of breath, vomiting, or diarrhea, get worse  · Your symptoms, such as fever and coughing, seem to get better, but then get worse  Contact your healthcare provider if:   · You have new muscle pain or weakness  · You have questions or concerns about your condition or care  Treatment for influenza  may include any of the following:  · Acetaminophen  decreases pain and fever  It is available without a doctor's order  Ask how much to take and how often to take it  Follow directions  Acetaminophen can cause liver damage if not taken correctly  · NSAIDs , such as ibuprofen, help decrease swelling, pain, and fever   This medicine is available with or without a doctor's order  NSAIDs can cause stomach bleeding or kidney problems in certain people  If you take blood thinner medicine, always ask your healthcare provider if NSAIDs are safe for you  Always read the medicine label and follow directions  · Antivirals  help fight a viral infection  Manage your symptoms:   · Rest  as much as you can to help you recover  · Drink liquids as directed  to help prevent dehydration  Ask how much liquid to drink each day and which liquids are best for you  Prevent the spread of the flu:   · Wash your hands often  Use soap and water  Wash your hands after you use the bathroom, change a child's diapers, or sneeze  Wash your hands before you prepare or eat food  Use gel hand cleanser when soap and water are not available  Do not touch your eyes, nose, or mouth unless you have washed your hands first            · Cover your mouth when you sneeze or cough  Cough into a tissue or the bend of your arm  · Clean shared items with a germ-killing   Clean table surfaces, doorknobs, and light switches  Do not share towels, silverware, and dishes with people who are sick  Wash bed sheets, towels, silverware, and dishes with soap and water  · Wear a mask  over your mouth and nose if you are sick or are near anyone who is sick  · Stay away from others  if you are sick  · Influenza vaccine  helps prevent influenza (flu)  Everyone older than 6 months should get a yearly influenza vaccine  Get the vaccine as soon as it is available, usually in September or October each year  Follow up with your healthcare provider as directed:  Write down your questions so you remember to ask them during your visits  © 2017 [employer]  Information is for End User's use only and may not be sold, redistributed or otherwise used for commercial purposes   All illustrations and images included in CareNotes® are the copyrighted property of [employer]  or [employer]  The above information is an  only  It is not intended as medical advice for individual conditions or treatments  Talk to your doctor, nurse or pharmacist before following any medical regimen to see if it is safe and effective for you

## 2020-01-04 NOTE — PROGRESS NOTES
[facility]        NAME: Ameena Baker is a 60 y o  female  : 1955    MRN: [id number]  DATE: 2020  TIME: 8:52 PM    Assessment and Plan   Influenza [J11 1]  1  Influenza  oseltamivir (TAMIFLU) 75 mg capsule    ondansetron (ZOFRAN) 4 mg tablet         Patient Instructions   Most likely influenza, discussed send out test, patient declined  Rx tamiflu twice daily x 5 days sent via EMR  Rx zofran sent via EMR as needed for nausea/vomiting related to tamiflu  Recommend tylenol/ibuprofen as needed for pain/fever  Rest, fluids, and supportive care  Follow up with PCP in 3-5 days  Proceed to  ER if symptoms worsen  Chief Complaint     Chief Complaint   Patient presents with    Fever     Pt reports of chills, body aches and fever started last night  History of Present Illness       Darnell Downs is a 22-year-old female who presents to clinic complaining of sudden onset of cough, myalgias, and fever times 2 days  She states that the cough is dry and nonproductive  She has also been having on and off fever alternating with chills  She has also vomited 3 times over the last few days  She states that her daughter was recently diagnosed with the flu and she was taking care of her  She denies any ear pain, sore throat, sinus pain or pressure, abdominal pain, diarrhea, shortness of breath, or chest pain  Review of Systems   Review of Systems   Constitutional: Positive for chills, fatigue and fever  HENT: Positive for congestion  Negative for ear pain, sinus pressure, sinus pain and sore throat  Respiratory: Positive for cough  Negative for chest tightness and shortness of breath  Cardiovascular: Negative for chest pain  Gastrointestinal: Positive for nausea and vomiting  Negative for abdominal pain, constipation and diarrhea  Neurological: Negative for headaches       Current Medications       Current Outpatient Medications:     aspirin (ECOTRIN LOW STRENGTH) 81 mg EC tablet, Take 81 mg by mouth daily, Disp: , Rfl:     atorvastatin (LIPITOR) 20 mg tablet, TAKE 1 TABLET BY MOUTH EVERY DAY, Disp: 90 tablet, Rfl: 0    Cholecalciferol (VITAMIN D3) 3000 units TABS, Take 1,000 Units by mouth 2 (two) times a day  , Disp: , Rfl:     Coenzyme Q10 (COQ10) 200 MG CAPS, Take 1 capsule by mouth daily, Disp: , Rfl:     ezetimibe (ZETIA) 10 mg tablet, TAKE 1 TABLET (10 MG TOTAL) BY MOUTH DAILY, Disp: 90 tablet, Rfl: 1    lisinopril (ZESTRIL) 20 mg tablet, Take 1 tablet (20 mg total) by mouth daily, Disp: 90 tablet, Rfl: 1    metFORMIN (GLUCOPHAGE) 500 mg tablet, TAKE 1 TABLET BY MOUTH TWICE A DAY, Disp: 180 tablet, Rfl: 1    Omega-3 Fatty Acids (FISH OIL) 1200 MG CAPS, Take 2 capsules by mouth 2 (two) times a day, Disp: , Rfl:     ondansetron (ZOFRAN) 4 mg tablet, Take 1 tablet (4 mg total) by mouth every 8 (eight) hours as needed for nausea or vomiting, Disp: 20 tablet, Rfl: 0    oseltamivir (TAMIFLU) 75 mg capsule, Take 1 capsule (75 mg total) by mouth 2 (two) times a day for 5 days, Disp: 10 capsule, Rfl: 0    Current Allergies     Allergies as of 01/04/2020 - Reviewed 01/04/2020   Allergen Reaction Noted    Other  01/25/2005    Penicillins Rash 02/16/2004            The following portions of the patient's history were reviewed and updated as appropriate: allergies, current medications, past family history, past medical history, past social history, past surgical history and problem list      Past Medical History:   Diagnosis Date    Arthropathy     ONSET: [id number]    Cellulitis     ONSET: 76RJX1283    Costochondritis     ONSET: 33ATF8551    Dermatitis     ONSET: 50PXB0286    Hemorrhagic detachment of retinal pigment epithelium     ONSET: 63YAP5751    Hypertension     LAST ASSESSED: 54WYH1087    Ingrown nail     LAST ASSESSED: 01TYN5198    Labyrinthitis     ONSET: 26TMN7572    Lymphadenopathy     ONSET: 59BQL1082    Myalgia     ONSET: 62ZAI1679    Myositis     ONSET: 72PXF2439    Nonallopathic lesion     ONSET: 06KVC2512    Shortness of breath     ONSET: 94HTX3241    Systemic lupus erythematosus ([facility] [address] )     LAST ASSESSED: 84YTQ1670    Tinea corporis     ONSET: 08REI9565    Vertigo     LAST ASSESSED: 05KTC4619       History reviewed  No pertinent surgical history  Family History   Problem Relation Age of Onset    Stomach cancer Mother         MALIGNANT NEOPLASM    Hypertension Father     Coronary artery disease Sister     Coronary artery disease Brother     Other Brother         CARDIAC PACEMAKER, CARDIOMEGALY         Medications have been verified  Objective   BP (!) 188/90   Pulse (!) 110   Temp (!) 101 °F (38 3 °C)   Resp 20   Ht 5' 2" (1 575 m)   Wt 95 3 kg (210 lb)   SpO2 95%   BMI 38 41 kg/m²        Physical Exam     Physical Exam   Constitutional: She is oriented to person, place, and time  She appears well-developed and well-nourished  No distress  HENT:   Right Ear: Hearing, tympanic membrane, external ear and ear canal normal    Left Ear: Hearing, tympanic membrane, external ear and ear canal normal    Nose: Rhinorrhea present  Right sinus exhibits no maxillary sinus tenderness and no frontal sinus tenderness  Left sinus exhibits no maxillary sinus tenderness and no frontal sinus tenderness  Mouth/Throat: Uvula is midline, oropharynx is clear and moist and mucous membranes are normal  No tonsillar exudate  Cardiovascular: Normal rate, regular rhythm and normal heart sounds  Pulmonary/Chest: Effort normal and breath sounds normal  No stridor  No respiratory distress  She has no wheezes  She has no rales  Lymphadenopathy:     She has no cervical adenopathy  Neurological: She is alert and oriented to person, place, and time  Nursing note and vitals reviewed

## 2020-01-04 NOTE — LETTER
January 4, 2020     Patient: Tawana Moreno   YOB: 1955   Date of Visit: 1/4/2020       To Whom it May Concern: Mariano Paris was seen in my clinic on 1/4/2020  Please excuse from work on 1/4/2020 and 1/5/2020  If you have any questions or concerns, please don't hesitate to call           Sincerely,          Esteban Miller PA-C        CC: No Recipients

## 2020-03-25 ENCOUNTER — TELEMEDICINE (OUTPATIENT)
Dept: FAMILY MEDICINE CLINIC | Facility: CLINIC | Age: 65
End: 2020-03-25
Payer: COMMERCIAL

## 2020-03-25 DIAGNOSIS — R73.01 IMPAIRED FASTING GLUCOSE: ICD-10-CM

## 2020-03-25 DIAGNOSIS — Z12.31 SCREENING MAMMOGRAM, ENCOUNTER FOR: ICD-10-CM

## 2020-03-25 DIAGNOSIS — I10 BENIGN ESSENTIAL HYPERTENSION: ICD-10-CM

## 2020-03-25 DIAGNOSIS — M32.9 SYSTEMIC LUPUS ERYTHEMATOSUS, UNSPECIFIED SLE TYPE, UNSPECIFIED ORGAN INVOLVEMENT STATUS (HCC): Primary | ICD-10-CM

## 2020-03-25 DIAGNOSIS — E78.5 HYPERLIPIDEMIA, UNSPECIFIED HYPERLIPIDEMIA TYPE: ICD-10-CM

## 2020-03-25 PROCEDURE — 99214 OFFICE O/P EST MOD 30 MIN: CPT | Performed by: FAMILY MEDICINE

## 2020-03-25 PROCEDURE — 1036F TOBACCO NON-USER: CPT | Performed by: FAMILY MEDICINE

## 2020-03-25 PROCEDURE — 3077F SYST BP >= 140 MM HG: CPT | Performed by: FAMILY MEDICINE

## 2020-03-25 PROCEDURE — 3080F DIAST BP >= 90 MM HG: CPT | Performed by: FAMILY MEDICINE

## 2020-03-25 PROCEDURE — 4010F ACE/ARB THERAPY RXD/TAKEN: CPT | Performed by: FAMILY MEDICINE

## 2020-03-25 RX ORDER — ATORVASTATIN CALCIUM 20 MG/1
20 TABLET, FILM COATED ORAL DAILY
Qty: 90 TABLET | Refills: 0 | Status: SHIPPED | OUTPATIENT
Start: 2020-03-25 | End: 2020-06-15

## 2020-03-25 RX ORDER — LISINOPRIL 20 MG/1
20 TABLET ORAL DAILY
Qty: 90 TABLET | Refills: 1 | Status: SHIPPED | OUTPATIENT
Start: 2020-03-25 | End: 2020-09-24

## 2020-03-25 RX ORDER — EZETIMIBE 10 MG/1
10 TABLET ORAL DAILY
Qty: 90 TABLET | Refills: 1 | Status: SHIPPED | OUTPATIENT
Start: 2020-03-25 | End: 2020-09-24

## 2020-03-25 NOTE — PROGRESS NOTES
Virtual Regular Visit    Problem List Items Addressed This Visit        Endocrine    Impaired fasting glucose    Relevant Medications    metFORMIN (GLUCOPHAGE) 500 mg tablet    Other Relevant Orders    Hemoglobin A1C       Cardiovascular and Mediastinum    Benign essential hypertension    Relevant Medications    lisinopril (ZESTRIL) 20 mg tablet    Other Relevant Orders    Comprehensive metabolic panel    CBC and differential       Other    Hyperlipidemia    Relevant Medications    atorvastatin (LIPITOR) 20 mg tablet    ezetimibe (ZETIA) 10 mg tablet    Other Relevant Orders    Lipid panel    Systemic lupus erythematosus ([facility] [address] ) - Primary        1) SLE: Hasn't See Dr Russ Doshi since 2014; Off Plaquenil x 2 years  Given worsening joint stiffness, will prescribe medication as long as patient has had an UTD eye exam  Will Call Dr Sari London office for records  2) HTN: Off medications: NO BP given she has no machine  Advised to get BP machine for home  Will restart, and bring patient in for BP check  3) Impaired fasting: Repeat A1c educations given on weight loss  Has been taking Metformin 500 mg bid    4) HLD: Uncontrolled, off medications  Repeat FLP      IMPORTANT For patient to come in after COVID crisis or before given that we can have her come in downstairs, patient declined at this time  BMI Counseling: There is no height or weight on file to calculate BMI  The BMI is above normal  Nutrition recommendations include decreasing portion sizes and consuming healthier snacks  Exercise recommendations include exercising 3-5 times per week             Reason for visit is  Off medications, need refills      Encounter provider Vickey Pugh MD    Provider located at [address] [place] [id number]      Recent Visits  Date Type Provider Dept   03/25/20 Telemedicine Vickey Pugh  [facility] recent visits within past 7 days and meeting all other requirements     Future Appointments  No visits were found meeting these conditions  Showing future appointments within next 150 days and meeting all other requirements        After connecting through [employer], the patient was identified by name and date of birth  Matteo Major was informed that this is a telemedicine visit and that the visit is being conducted through Draft which may not be secure and therefore, might not be HIPAA-compliant  My office door was closed  No one else was in the room  She acknowledged consent and understanding of privacy and security of the video platform  The patient has agreed to participate and understands they can discontinue the visit at any time  Corazon Canela is a 60 y o  female calling to establish care  Patient has not seen a provider in almost 2 years given insurance has changed  Patient states that she originally was taking multiple medications but given that she had no insurance was off multiple meds  Currently the only medication the patient is taking his metformin 500 mg twice a day for pre diabetes  Patient has not been very compliant with her diet  Patient states that she is not taking her Lipitor 20 mg/ Zetia 10 mg  Patient states that she was diagnosed with SLE multiple years ago  Her pains right now or joint stiffness of the upper extremities and lower extremity  She has not seen a rheumatologist since 2014 given that her primary care at that time took over prescribing the medications  Patient gets eye exams every 6 months by Dr Amber Brown office  Patient states that she was taking Plaquenil once a day  Patient states that she has had chronic shortness of breath for multiple years  She does h head pressure 1 her blood pressure slightly elevated  She does not have a blood pressure cuff at home  Nor us sugar machine  Patient states that she denies any chest pain        Past Medical History: Diagnosis Date    Arthropathy     ONSET: [id number]    Cellulitis     ONSET: 81WFI2180    Costochondritis     ONSET: 47MZJ1339    Dermatitis     ONSET: 17EGP8567    Hemorrhagic detachment of retinal pigment epithelium     ONSET: 05ZHO3433    Hypertension     LAST ASSESSED: 71DNJ7802    Ingrown nail     LAST ASSESSED: 07ICU8026    Labyrinthitis     ONSET: 75JNY5103    Lymphadenopathy     ONSET: 13RGE3471    Myalgia     ONSET: 89JET3235    Myositis     ONSET: 54RUZ5671    Nonallopathic lesion     ONSET: 00IHV2909    Shortness of breath     ONSET: 54IXN2082    Systemic lupus erythematosus (HCC)     LAST ASSESSED: 57BOG4478    Tinea corporis     ONSET: 80VJU1467    Vertigo     LAST ASSESSED: 42PRU3918       History reviewed  No pertinent surgical history  Current Outpatient Medications   Medication Sig Dispense Refill    aspirin (ECOTRIN LOW STRENGTH) 81 mg EC tablet Take 81 mg by mouth daily      atorvastatin (LIPITOR) 20 mg tablet Take 1 tablet (20 mg total) by mouth daily 90 tablet 0    Cholecalciferol (VITAMIN D3) 3000 units TABS Take 1,000 Units by mouth 2 (two) times a day        Coenzyme Q10 (COQ10) 200 MG CAPS Take 1 capsule by mouth daily      ezetimibe (ZETIA) 10 mg tablet Take 1 tablet (10 mg total) by mouth daily 90 tablet 1    lisinopril (ZESTRIL) 20 mg tablet Take 1 tablet (20 mg total) by mouth daily 90 tablet 1    metFORMIN (GLUCOPHAGE) 500 mg tablet Take 1 tablet (500 mg total) by mouth 2 (two) times a day 180 tablet 1    Omega-3 Fatty Acids (FISH OIL) 1200 MG CAPS Take 2 capsules by mouth 2 (two) times a day       No current facility-administered medications for this visit  Allergies   Allergen Reactions    Other      Reaction Date: [id number]; Annotation - [id number]: rash   madlpn adhesive tape    Penicillins Rash     Reaction Date: [id number];  Annotation - [id number]: jjw       Review of Systems   Constitutional: Negative for activity change, appetite change, chills, fatigue and fever  HENT: Negative for congestion  Respiratory: Positive for shortness of breath  Negative for cough and chest tightness  Cardiovascular: Negative for chest pain and leg swelling  Gastrointestinal: Negative for abdominal distention, abdominal pain, constipation, diarrhea, nausea and vomiting  Musculoskeletal: Positive for arthralgias  All other systems reviewed and are negative  Physical Exam   Constitutional: She is oriented to person, place, and time  She appears well-developed and well-nourished  Pulmonary/Chest: Effort normal    Neurological: She is alert and oriented to person, place, and time  Psychiatric: She has a normal mood and affect  Her behavior is normal  Judgment and thought content normal    Vitals reviewed  I spent 40 minutes with the patient during this visit

## 2020-03-26 ENCOUNTER — TELEPHONE (OUTPATIENT)
Dept: ADMINISTRATIVE | Facility: OTHER | Age: 65
End: 2020-03-26

## 2020-03-26 ENCOUNTER — TELEPHONE (OUTPATIENT)
Dept: FAMILY MEDICINE CLINIC | Facility: CLINIC | Age: 65
End: 2020-03-26

## 2020-03-29 LAB
ALBUMIN SERPL-MCNC: 4.3 G/DL (ref 3.8–4.8)
ALBUMIN/GLOB SERPL: 1.8 {RATIO} (ref 1.2–2.2)
ALP SERPL-CCNC: 68 IU/L (ref 39–117)
ALT SERPL-CCNC: 18 IU/L (ref 0–32)
AST SERPL-CCNC: 15 IU/L (ref 0–40)
BASOPHILS # BLD AUTO: 0.1 X10E3/UL (ref 0–0.2)
BASOPHILS NFR BLD AUTO: 1 %
BILIRUB SERPL-MCNC: 0.5 MG/DL (ref 0–1.2)
BUN SERPL-MCNC: 12 MG/DL (ref 8–27)
BUN/CREAT SERPL: 17 (ref 12–28)
CALCIUM SERPL-MCNC: 9.1 MG/DL (ref 8.7–10.3)
CHLORIDE SERPL-SCNC: 106 MMOL/L (ref 96–106)
CHOLEST SERPL-MCNC: 171 MG/DL (ref 100–199)
CO2 SERPL-SCNC: 24 MMOL/L (ref 20–29)
CREAT SERPL-MCNC: 0.69 MG/DL (ref 0.57–1)
EOSINOPHIL # BLD AUTO: 0.4 X10E3/UL (ref 0–0.4)
EOSINOPHIL NFR BLD AUTO: 4 %
ERYTHROCYTE [DISTWIDTH] IN BLOOD BY AUTOMATED COUNT: 14.4 % (ref 11.7–15.4)
GLOBULIN SER-MCNC: 2.4 G/DL (ref 1.5–4.5)
GLUCOSE SERPL-MCNC: 121 MG/DL (ref 65–99)
HBA1C MFR BLD: 6 % (ref 4.8–5.6)
HCT VFR BLD AUTO: 41.3 % (ref 34–46.6)
HDLC SERPL-MCNC: 49 MG/DL
HGB BLD-MCNC: 13.3 G/DL (ref 11.1–15.9)
IMM GRANULOCYTES # BLD: 0 X10E3/UL (ref 0–0.1)
IMM GRANULOCYTES NFR BLD: 0 %
LDLC SERPL CALC-MCNC: 101 MG/DL (ref 0–99)
LYMPHOCYTES # BLD AUTO: 3 X10E3/UL (ref 0.7–3.1)
LYMPHOCYTES NFR BLD AUTO: 31 %
MCH RBC QN AUTO: 27.8 PG (ref 26.6–33)
MCHC RBC AUTO-ENTMCNC: 32.2 G/DL (ref 31.5–35.7)
MCV RBC AUTO: 86 FL (ref 79–97)
MICRODELETION SYND BLD/T FISH: NORMAL
MONOCYTES # BLD AUTO: 0.7 X10E3/UL (ref 0.1–0.9)
MONOCYTES NFR BLD AUTO: 7 %
NEUTROPHILS # BLD AUTO: 5.4 X10E3/UL (ref 1.4–7)
NEUTROPHILS NFR BLD AUTO: 57 %
PLATELET # BLD AUTO: 348 X10E3/UL (ref 150–450)
POTASSIUM SERPL-SCNC: 4.9 MMOL/L (ref 3.5–5.2)
PROT SERPL-MCNC: 6.7 G/DL (ref 6–8.5)
RBC # BLD AUTO: 4.79 X10E6/UL (ref 3.77–5.28)
SL AMB EGFR AFRICAN AMERICAN: 108 ML/MIN/1.73
SL AMB EGFR NON AFRICAN AMERICAN: 94 ML/MIN/1.73
SL AMB VLDL CHOLESTEROL CALC: 21 MG/DL (ref 5–40)
SODIUM SERPL-SCNC: 145 MMOL/L (ref 134–144)
TRIGL SERPL-MCNC: 104 MG/DL (ref 0–149)
WBC # BLD AUTO: 9.5 X10E3/UL (ref 3.4–10.8)

## 2020-04-10 ENCOUNTER — TELEPHONE (OUTPATIENT)
Dept: FAMILY MEDICINE CLINIC | Facility: CLINIC | Age: 65
End: 2020-04-10

## 2020-06-15 DIAGNOSIS — E78.5 HYPERLIPIDEMIA, UNSPECIFIED HYPERLIPIDEMIA TYPE: ICD-10-CM

## 2020-06-15 RX ORDER — ATORVASTATIN CALCIUM 20 MG/1
TABLET, FILM COATED ORAL
Qty: 90 TABLET | Refills: 0 | Status: SHIPPED | OUTPATIENT
Start: 2020-06-15 | End: 2020-09-08

## 2020-07-07 ENCOUNTER — OFFICE VISIT (OUTPATIENT)
Dept: FAMILY MEDICINE CLINIC | Facility: CLINIC | Age: 65
End: 2020-07-07
Payer: COMMERCIAL

## 2020-07-07 VITALS
OXYGEN SATURATION: 98 % | HEART RATE: 80 BPM | DIASTOLIC BLOOD PRESSURE: 84 MMHG | BODY MASS INDEX: 37.5 KG/M2 | TEMPERATURE: 98.6 F | SYSTOLIC BLOOD PRESSURE: 128 MMHG | HEIGHT: 62 IN | RESPIRATION RATE: 20 BRPM | WEIGHT: 203.8 LBS

## 2020-07-07 DIAGNOSIS — E78.5 HYPERLIPIDEMIA, UNSPECIFIED HYPERLIPIDEMIA TYPE: ICD-10-CM

## 2020-07-07 DIAGNOSIS — E11.9 TYPE 2 DIABETES MELLITUS WITHOUT COMPLICATION, WITHOUT LONG-TERM CURRENT USE OF INSULIN (HCC): Primary | ICD-10-CM

## 2020-07-07 DIAGNOSIS — Z20.822 CLOSE EXPOSURE TO COVID-19 VIRUS: ICD-10-CM

## 2020-07-07 DIAGNOSIS — I10 BENIGN ESSENTIAL HYPERTENSION: ICD-10-CM

## 2020-07-07 LAB — SL AMB POCT HEMOGLOBIN AIC: 5.9 (ref ?–6.5)

## 2020-07-07 PROCEDURE — 3079F DIAST BP 80-89 MM HG: CPT | Performed by: FAMILY MEDICINE

## 2020-07-07 PROCEDURE — 99214 OFFICE O/P EST MOD 30 MIN: CPT | Performed by: FAMILY MEDICINE

## 2020-07-07 PROCEDURE — 1036F TOBACCO NON-USER: CPT | Performed by: FAMILY MEDICINE

## 2020-07-07 PROCEDURE — 3044F HG A1C LEVEL LT 7.0%: CPT | Performed by: FAMILY MEDICINE

## 2020-07-07 PROCEDURE — 83036 HEMOGLOBIN GLYCOSYLATED A1C: CPT | Performed by: FAMILY MEDICINE

## 2020-07-07 PROCEDURE — 3074F SYST BP LT 130 MM HG: CPT | Performed by: FAMILY MEDICINE

## 2020-07-07 PROCEDURE — 3008F BODY MASS INDEX DOCD: CPT | Performed by: FAMILY MEDICINE

## 2020-07-07 NOTE — PROGRESS NOTES
[facility]  DM Type 2 check  ASSESSMENT/PLAN  Ye Nagy is a 60 y o  female presents to the office for     Diagnoses and all orders for this visit:    Type 2 diabetes mellitus without complication, without long-term current use of insulin ([facility] [address] )  -     POCT hemoglobin A1c  -     Microalbumin / creatinine urine ratio  -     Microalbumin / creatinine urine ratio    Hyperlipidemia, unspecified hyperlipidemia type    Benign essential hypertension    Close exposure to COVID-19 virus  -     SARS CoV-2 ANTIBODY,IgG- Lab Collect; Future        1) Diabetes Type 2 ( NEW DX; patient has preDM in the setting of Metformin 500 mg BID) At this time dx given   Without medications the patient likely has A1c of > 6 5%  - Controlled  -Most recent lab testing:   Results from last 6 Months   Lab Units 07/07/20  1609 03/28/20  0727   HEMOGLOBIN A1C  5 9 6 0*   LDL CALC mg/dL  --  101*   CREATININE mg/dL  --  0 69       - Continue on Metformin 500 mg BID   - Opthamology: WNL per patient  Will obtain records from Dr Renay Yang  - Podiatry/ Foot exam: WNL today  - Vaccines:  Immunization History   Administered Date(s) Administered    Influenza Quadrivalent Preservative Free 3 years and older IM 09/25/2014    Influenza TIV (IM) 09/05/2009, 09/23/2010, 10/01/2011, 09/26/2013, 09/20/2017    Td (adult), adsorbed 07/14/1997    Tdap 05/26/2016   - Diabetes education:Encourage the patient to continue lifestyle changes  2  HLD: Controlled on Zetia 10mg, and Lipitor 20mg  Repeat in 1 year    3  HTN: Controlled on Lisinopril 20mg daily  RTC in 6 months    Future Appointments   Date Time Provider Kaylee Palacios   1/7/2021  3:00 PM Tiffanie Dang MD [address] [place] [address] [facility]          Disposition: Return to the clinic in 3-6 months    BMI Counseling: Body mass index is 37 28 kg/m²   The BMI is above normal  Nutrition recommendations include decreasing portion sizes, decreasing fast food intake and limiting drinks that contain sugar  Exercise recommendations include exercising 3-5 times per week  SUBJECTIVE  CC: Diabetes (patient here for dm check)      HPI:  Vandana Rae is a 60 y o  female presenting to the office for Diabetes check with Chronic conditions check  Patient was never really given the diagnosis of diabetic  She was placed on metformin as prophylaxis  Sister unfortunately just recently passed away from diabetes complication  Patient states that she has a very strong family history of diabetes  Patient's A1c has always been around 6 0 with the addition of metformin  Patient states that she has been on lisinopril and atorvastatin for her cholesterol/blood pressure since our last discussion  She takes it without any difficulties  Currently patient has no acute concerns today  Recently just had her eyes checked by her doctor referred to  Retina speciliist They said that there was no signs of diabetic damage    Review of Systems   Constitutional: Negative for activity change, appetite change, chills, fatigue and fever  HENT: Negative for congestion  Respiratory: Negative for cough, chest tightness and shortness of breath  Cardiovascular: Negative for chest pain and leg swelling  Gastrointestinal: Negative for abdominal distention, abdominal pain, constipation, diarrhea, nausea and vomiting  All other systems reviewed and are negative        Historical Information   The patient history was reviewed as follows:    Past Medical History:   Diagnosis Date    Arthropathy     ONSET: [id number]    Cellulitis     ONSET: 39FKI3196    Costochondritis     ONSET: 91UAC5512    Dermatitis     ONSET: 85YPE8230    Hemorrhagic detachment of retinal pigment epithelium     ONSET: 04CWR0608    Hypertension     LAST ASSESSED: 78EQN8470    Ingrown nail     LAST ASSESSED: 87BAK0326    Labyrinthitis     ONSET: 83VTO8827    Lymphadenopathy     ONSET: 44DJD8536    Myalgia ONSET: 27XGZ1590    Myositis     ONSET: 10UZS0238    Nonallopathic lesion     ONSET: 74RHT3946    Shortness of breath     ONSET: 05DSV6655    Systemic lupus erythematosus ([facility] [address] )     LAST ASSESSED: 59GCP9066    Tinea corporis     ONSET: 99DOR0680    Vertigo     LAST ASSESSED: 69VZR7000     History reviewed  No pertinent surgical history  Family History   Problem Relation Age of Onset    Stomach cancer Mother         MALIGNANT NEOPLASM    Hypertension Father     Coronary artery disease Sister     Coronary artery disease Brother     Other Brother         CARDIAC PACEMAKER, CARDIOMEGALY      Social History   Social History     Substance and Sexual Activity   Alcohol Use Yes    Comment: rare     Social History     Substance and Sexual Activity   Drug Use No     Social History     Tobacco Use   Smoking Status Never Smoker   Smokeless Tobacco Never Used       Medications:     Current Outpatient Medications:     atorvastatin (LIPITOR) 20 mg tablet, TAKE 1 TABLET BY MOUTH EVERY DAY, Disp: 90 tablet, Rfl: 0    Cholecalciferol (VITAMIN D3) 3000 units TABS, Take 1,000 Units by mouth 2 (two) times a day  , Disp: , Rfl:     Coenzyme Q10 (COQ10) 200 MG CAPS, Take 1 capsule by mouth daily, Disp: , Rfl:     ezetimibe (ZETIA) 10 mg tablet, Take 1 tablet (10 mg total) by mouth daily, Disp: 90 tablet, Rfl: 1    lisinopril (ZESTRIL) 20 mg tablet, Take 1 tablet (20 mg total) by mouth daily, Disp: 90 tablet, Rfl: 1    metFORMIN (GLUCOPHAGE) 500 mg tablet, Take 1 tablet (500 mg total) by mouth 2 (two) times a day, Disp: 180 tablet, Rfl: 1    Omega-3 Fatty Acids (FISH OIL) 1200 MG CAPS, Take 2 capsules by mouth 2 (two) times a day, Disp: , Rfl:     aspirin (ECOTRIN LOW STRENGTH) 81 mg EC tablet, Take 81 mg by mouth daily, Disp: , Rfl:   Allergies   Allergen Reactions    Other      Reaction Date: [id number]; Annotation - [id number]: rash   madlpn adhesive tape    Penicillins Rash     Reaction Date: [id number];  Annotation - [id number]: jjw       OBJECTIVE    Vitals:   Vitals:    07/07/20 1559   BP: 128/84   BP Location: Left arm   Patient Position: Sitting   Cuff Size: Standard   Pulse: 80   Resp: 20   Temp: 98 6 °F (37 °C)   TempSrc: Tympanic   SpO2: 98%   Weight: 92 4 kg (203 lb 12 8 oz)   Height: 5' 2" (1 575 m)           Physical Exam   Constitutional: She is oriented to person, place, and time  Vital signs are normal  She appears well-developed and well-nourished  HENT:   Head: Normocephalic and atraumatic  Eyes: Pupils are equal, round, and reactive to light  Conjunctivae and EOM are normal    Neck: Normal range of motion  Neck supple  Cardiovascular: Normal rate, regular rhythm, S1 normal, S2 normal, normal heart sounds and intact distal pulses  Pulses are no weak pulses  No murmur heard  Pulses:       Dorsalis pedis pulses are 2+ on the right side, and 2+ on the left side  Posterior tibial pulses are 2+ on the right side, and 2+ on the left side  Pulmonary/Chest: Effort normal and breath sounds normal  No respiratory distress  She has no wheezes  Abdominal: Soft  Bowel sounds are normal  She exhibits no distension  There is no tenderness  Musculoskeletal: Normal range of motion  She exhibits no edema  Feet:   Right Foot:   Skin Integrity: Negative for ulcer, skin breakdown, erythema, warmth, callus or dry skin  Left Foot:   Skin Integrity: Negative for ulcer, skin breakdown, erythema, warmth, callus or dry skin  Neurological: She is alert and oriented to person, place, and time  She has normal strength  Skin: Skin is warm  Psychiatric: She has a normal mood and affect  Her speech is normal and behavior is normal  Judgment and thought content normal    Vitals reviewed  Patient's shoes and socks removed  Right Foot/Ankle   Right Foot Inspection  Skin Exam: skin normal and skin intact no dry skin, no warmth, no callus, no erythema, no maceration, no abnormal color, no pre-ulcer, no ulcer and no callus                          Toe Exam: ROM and strength within normal limitsno swelling  Sensory       Monofilament testing: intact  Vascular  Capillary refills: < 3 seconds  The right DP pulse is 2+  The right PT pulse is 2+  Left Foot/Ankle  Left Foot Inspection  Skin Exam: skin normal and skin intactno dry skin, no warmth, no erythema, no maceration, normal color, no pre-ulcer, no ulcer and no callus                         Toe Exam: ROM and strength within normal limitsno swelling                   Sensory       Monofilament: intact  Vascular  Capillary refills: < 3 seconds  The left DP pulse is 2+  The left PT pulse is 2+  Assign Risk Category:  No deformity present; No loss of protective sensation;  No weak pulses       Risk: 0       Lennox Swartz MD  [id number]

## 2020-07-07 NOTE — PATIENT INSTRUCTIONS
Type 2 Diabetes in Adults   WHAT YOU NEED TO KNOW:   Type 2 diabetes is a disease that affects how your body uses glucose (sugar)  Normally, when the blood sugar level increases, the pancreas makes more insulin  Insulin helps move sugar out of the blood so it can be used for energy  Type 2 diabetes develops because either the body cannot make enough insulin, or it cannot use the insulin correctly  After many years, your pancreas may stop making insulin  DISCHARGE INSTRUCTIONS:   Call 911 for any of the following:   · You have any of the following signs of a stroke:      ¨ Numbness or drooping on one side of your face     ¨ Weakness in an arm or leg    ¨ Confusion or difficulty speaking    ¨ Dizziness, a severe headache, or vision loss    · You have any of the following signs of a heart attack:      ¨ Squeezing, pressure, or pain in your chest that lasts longer than 5 minutes or returns    ¨ Discomfort or pain in your back, neck, jaw, stomach, or arm     ¨ Trouble breathing    ¨ Nausea or vomiting    ¨ Lightheadedness or a sudden cold sweat, especially with chest pain or trouble breathing  Return to the emergency department if:   · You have severe abdominal pain, or the pain spreads to your back  You may also be vomiting  · You have trouble staying awake or focusing  · You are shaking or sweating  · You have blurred or double vision  · Your breath has a fruity, sweet smell  · Your breathing is deep and labored, or rapid and shallow  · Your heartbeat is fast and weak  Contact your healthcare provider if:   · You are vomiting or have diarrhea  · You have an upset stomach and cannot eat the foods on your meal plan  · You feel weak or more tired than usual      · You feel dizzy, have headaches, or are easily irritated  · Your skin is red, warm, dry, or swollen  · You have a wound that does not heal      · You have numbness in your arms or legs       · You have trouble coping with your illness, or you feel anxious or depressed  · You have questions or concerns about your condition or care  Medicines: You may  need any of the following:  · Hypoglycemic medicines or insulin  may be given to decrease the amount of sugar in your blood  · Blood pressure medicine  may be given to lower your blood pressure  Your blood pressure should be less than 140/90  · Cholesterol lowering medicine  may be given to prevent heart disease  · Antiplatelets , such as aspirin, help prevent blood clots  Take your antiplatelet medicine exactly as directed  These medicines make it more likely for you to bleed or bruise  If you are told to take aspirin, do not take acetaminophen or ibuprofen instead  · Take your medicine as directed  Contact your healthcare provider if you think your medicine is not helping or if you have side effects  Tell him or her if you are allergic to any medicine  Keep a list of the medicines, vitamins, and herbs you take  Include the amounts, and when and why you take them  Bring the list or the pill bottles to follow-up visits  Carry your medicine list with you in case of an emergency  Check your blood sugar level as directed: You will be taught how to use a glucose monitor  Ask your healthcare provider when and how often to check during the day  You will need to check your blood sugar level at least 3 times each day if you are on insulin  If you check your blood sugar level before a meal , it should be between 80 and 130 mg/dL  If you check your blood sugar level 1 to 2 hours after a meal , it should be less than 180 mg/dL  Ask your healthcare provider if these are good goals for you  Write down your results, and show them to your healthcare provider  He may use the results to make changes to your medicine, food, or exercise schedules  If your blood sugar level is too low: Your blood sugar level is too low if it goes below 70 mg/dL   If the level is too low, eat or drink 15 grams of fast-acting carbohydrate  These are found naturally in fruits  Fast-acting carbohydrates will raise your blood sugar level quickly  Examples of 15 grams of fast-acting carbohydrate are 4 ounces (½ cup) of fruit juice or 4 ounces of regular soda  Other examples are 2 tablespoons of raisins or 3 to 4 glucose tablets  Check your blood sugar level 15 minutes later  If the level is still low (less than 100 mg/dL), eat another 15 grams of carbohydrate  When the level returns to 100 mg/dL, eat a snack or meal that contains carbohydrates  This will help prevent another drop in blood sugar  Always carefully follow your healthcare provider's instructions on how to treat low blood sugar levels  Wear medical alert identification:  Wear medical alert jewelry or carry a card that says you have diabetes  Ask your healthcare provider where to get these items  Check your feet each day for sores:  Wear shoes and socks that fit correctly  Do not trim your toenails  Ask your healthcare provider for more information about foot care  Maintain a healthy weight:  Ask your healthcare provider how much you should weigh  A healthy weight can help you control your diabetes  Ask your provider to help you create a weight loss plan if you are overweight  Together you can set manageable weight loss goals  Follow your meal plan:  A dietitian will help you make a meal plan to keep your blood sugar level steady  Do not skip meals  Your blood sugar level may drop too low if you have taken diabetes medicine and do not eat  · Keep track of carbohydrates (sugar and starchy foods)  Your blood sugar level can get too high if you eat too many carbohydrates  Your dietitian will help you plan meals and snacks that have the right amount of carbohydrates  · Eat low-fat foods , such as skinless chicken and low-fat milk  · Eat less sodium (salt)    Limit high-sodium foods, such as soy sauce, potato chips, and soup  Do not add salt to food you cook  Limit your use of table salt  You should have less than 2,300 mg of sodium per day  · Eat high-fiber foods , such as vegetables, whole grain breads, and beans  · Limit alcohol  Alcohol affects your blood sugar level and can make it harder to manage your diabetes  Limit alcohol to 1 drink a day if you are a woman  Limit alcohol to 2 drinks a day if you are a man  A drink of alcohol is 12 ounces of beer, 5 ounces of wine, or 1½ ounces of liquor  Exercise as directed:  Exercise can help keep your blood sugar level steady, decrease your risk of heart disease, and help you lose weight  Stretch before and after you exercise  Exercise for at least 150 minutes every week  Spread this amount of exercise over at least 3 days a week  Do not skip exercise more than 2 days in a row  Include muscle strengthening activities 2 to 3 days each week  Older adults should include balance training 2 to 3 times each week  Activities that help increase balance include yoga and marylin chi  Work with your healthcare provider to create an exercise plan  · Check your blood sugar level before and after exercise  Healthcare providers may tell you to change the amount of insulin you take or food you eat  If your blood sugar level is high, check your blood or urine for ketones before you exercise  Do not exercise if your blood sugar level is high and you have ketones  · If your blood sugar level is less than 100 mg/dL, have a carbohydrate snack before you exercise  Examples are 4 to 6 crackers, ½ banana, 8 ounces (1 cup) of milk, or 4 ounces (½ cup) of juice  Drink water or liquids that do not contain sugar before, during, and after exercise  Ask your dietitian or healthcare provider which liquids you should drink when you exercise  · Do not sit for longer than 30 minutes  If you cannot walk around, at least stand up  This will help you stay active and keep your blood circulating    Do not smoke: Nicotine and other chemicals in cigarettes and cigars can cause lung damage and make it more difficult to manage your diabetes  Ask your healthcare provider for information if you currently smoke and need help to quit  Do not use e-cigarettes or smokeless tobacco in place of cigarettes or to help you quit  They still contain nicotine  Check your blood pressure as directed:  Ask your healthcare provider what your blood pressure should be  Most adults with diabetes and high blood pressure should have a systolic blood pressure (first number) less than 140  Your diastolic blood pressure (second number) should be less than 90  Ask about vaccines: You have a higher risk for serious illness if you get the flu, pneumonia, or hepatitis  Ask your healthcare provider if you should get a flu, pneumonia, or hepatitis B vaccine, and when to get the vaccine  Follow up with your healthcare provider as directed: You may need to return to have your A1c checked every 3 months  You will need to return at least once each year to have your feet checked  You will need an eye exam once a year to check for retinopathy  You will also need urine tests every year to check for kidney problems  You may need tests to monitor for heart disease such as an EKG, stress test, blood pressure monitoring, and blood tests  Write down your questions so you remember to ask them during your visits  © 2017 [employer]  Information is for End User's use only and may not be sold, redistributed or otherwise used for commercial purposes  All illustrations and images included in CareNotes® are the copyrighted property of A D A M [employer] Inc  or [employer]  The above information is an  only  It is not intended as medical advice for individual conditions or treatments  Talk to your doctor, nurse or pharmacist before following any medical regimen to see if it is safe and effective for you

## 2020-07-08 ENCOUNTER — TELEPHONE (OUTPATIENT)
Dept: ADMINISTRATIVE | Facility: OTHER | Age: 65
End: 2020-07-08

## 2020-07-08 NOTE — TELEPHONE ENCOUNTER
----- Message from Ally Rodriguez MD sent at 7/7/2020  9:50 PM EDT -----  Please help me obtain Dr Devin Meek DM EYE exam from last month   Thank you

## 2020-07-08 NOTE — TELEPHONE ENCOUNTER
Upon review of the In Basket request and the patient's chart, initial outreach has been made via fax, please see Contacts section for details  A second outreach attempt will be made within 5 business days      Thank you  Armida Aragon

## 2020-07-08 NOTE — LETTER
Diabetic Eye Exam Form    Date Requested: 20  Patient: Konstantin Thomas  Patient : 1955   Referring Provider: Jody Espino MD    Dilated Retinal Exam, Optomap-Iris Exam, or Fundus Photography Done         Yes ([place] one above)         No     Date of Diabetic Eye Exam ______________________________  Left Eye      Exam did show retinopathy    Exam did not show retinopathy         Mild       Moderate       None       Proliferative       Severe     Right Eye     Exam did show retinopathy    Exam did not show retinopathy         Mild       Moderate       None       Proliferative       Severe     Comments __________________________________________________________    Practice Providing Exam ______________________________________________    Exam Performed By (print name) _______________________________________      Provider Signature ___________________________________________________      These reports are needed for  compliance    Please fax this completed form and a copy of the Diabetic Eye Exam report to our office located at [place] [address] as soon as possible to [phone] attention Clif[place] Melara: Phone [phone]    We thank you for your assistance in treating our mutual patient

## 2020-09-08 DIAGNOSIS — E78.5 HYPERLIPIDEMIA, UNSPECIFIED HYPERLIPIDEMIA TYPE: ICD-10-CM

## 2020-09-08 RX ORDER — ATORVASTATIN CALCIUM 20 MG/1
TABLET, FILM COATED ORAL
Qty: 90 TABLET | Refills: 0 | Status: SHIPPED | OUTPATIENT
Start: 2020-09-08 | End: 2020-12-18 | Stop reason: SDUPTHER

## 2020-09-24 DIAGNOSIS — E78.5 HYPERLIPIDEMIA, UNSPECIFIED HYPERLIPIDEMIA TYPE: ICD-10-CM

## 2020-09-24 DIAGNOSIS — I10 BENIGN ESSENTIAL HYPERTENSION: ICD-10-CM

## 2020-09-24 DIAGNOSIS — R73.01 IMPAIRED FASTING GLUCOSE: ICD-10-CM

## 2020-09-24 RX ORDER — EZETIMIBE 10 MG/1
TABLET ORAL
Qty: 90 TABLET | Refills: 1 | Status: SHIPPED | OUTPATIENT
Start: 2020-09-24 | End: 2021-03-26

## 2020-09-24 RX ORDER — LISINOPRIL 20 MG/1
TABLET ORAL
Qty: 90 TABLET | Refills: 1 | Status: SHIPPED | OUTPATIENT
Start: 2020-09-24 | End: 2021-03-26

## 2020-09-25 ENCOUNTER — HOSPITAL ENCOUNTER (OUTPATIENT)
Dept: MAMMOGRAPHY | Facility: HOSPITAL | Age: 65
Discharge: HOME | End: 2020-09-25
Payer: COMMERCIAL

## 2020-09-25 ENCOUNTER — TELEPHONE (OUTPATIENT)
Dept: FAMILY MEDICINE CLINIC | Facility: CLINIC | Age: 65
End: 2020-09-25

## 2020-09-25 VITALS — BODY MASS INDEX: 37.36 KG/M2 | HEIGHT: 62 IN | WEIGHT: 203 LBS

## 2020-09-25 DIAGNOSIS — Z12.31 SCREENING MAMMOGRAM, ENCOUNTER FOR: ICD-10-CM

## 2020-09-25 PROCEDURE — 77063 BREAST TOMOSYNTHESIS BI: CPT

## 2020-09-25 PROCEDURE — 77067 SCR MAMMO BI INCL CAD: CPT

## 2020-09-26 LAB
ALBUMIN/CREAT UR: 15 MG/G CREAT (ref 0–29)
CREAT UR-MCNC: 135.8 MG/DL
MICROALBUMIN UR-MCNC: 20.5 UG/ML
SARS-COV-2 IGG SERPL QL IA: NEGATIVE
SARS-COV-2 IGG SERPL QL IA: NORMAL

## 2020-09-28 ENCOUNTER — TELEPHONE (OUTPATIENT)
Dept: FAMILY MEDICINE CLINIC | Facility: CLINIC | Age: 65
End: 2020-09-28

## 2020-09-28 NOTE — TELEPHONE ENCOUNTER
----- Message from Keren Monk MD sent at 9/28/2020  8:53 AM EDT -----  Please advise the patient that her Urine was negative for any DM concenrs   And her antibodies to COVID were negative

## 2020-12-18 DIAGNOSIS — E78.5 HYPERLIPIDEMIA, UNSPECIFIED HYPERLIPIDEMIA TYPE: ICD-10-CM

## 2020-12-18 RX ORDER — ATORVASTATIN CALCIUM 20 MG/1
TABLET, FILM COATED ORAL
Qty: 90 TABLET | Refills: 0 | Status: SHIPPED | OUTPATIENT
Start: 2020-12-18 | End: 2021-11-22

## 2021-01-12 ENCOUNTER — OFFICE VISIT (OUTPATIENT)
Dept: FAMILY MEDICINE CLINIC | Facility: CLINIC | Age: 66
End: 2021-01-12
Payer: COMMERCIAL

## 2021-01-12 VITALS
OXYGEN SATURATION: 98 % | RESPIRATION RATE: 16 BRPM | WEIGHT: 201.6 LBS | DIASTOLIC BLOOD PRESSURE: 82 MMHG | HEIGHT: 62 IN | BODY MASS INDEX: 37.1 KG/M2 | HEART RATE: 81 BPM | TEMPERATURE: 97.6 F | SYSTOLIC BLOOD PRESSURE: 152 MMHG

## 2021-01-12 DIAGNOSIS — L84 CALLUS: ICD-10-CM

## 2021-01-12 DIAGNOSIS — R73.09 ABNORMAL GLUCOSE: Primary | ICD-10-CM

## 2021-01-12 DIAGNOSIS — I10 BENIGN ESSENTIAL HYPERTENSION: ICD-10-CM

## 2021-01-12 LAB — SL AMB POCT HEMOGLOBIN AIC: 6 (ref ?–6.5)

## 2021-01-12 PROCEDURE — 83036 HEMOGLOBIN GLYCOSYLATED A1C: CPT | Performed by: FAMILY MEDICINE

## 2021-01-12 PROCEDURE — 3725F SCREEN DEPRESSION PERFORMED: CPT | Performed by: FAMILY MEDICINE

## 2021-01-12 PROCEDURE — 3044F HG A1C LEVEL LT 7.0%: CPT | Performed by: FAMILY MEDICINE

## 2021-01-12 PROCEDURE — 99214 OFFICE O/P EST MOD 30 MIN: CPT | Performed by: FAMILY MEDICINE

## 2021-01-12 NOTE — PROGRESS NOTES
Sim Michael 1955 female MRN: [id number]    [facility] - [address]      ASSESSMENT/PLAN  Sim Michael is a 63 y o  female presents to the office for    1  Abnormal Glucose  A1c 6 0% on medications  Likely DM type 2  Will likely need to change dx if persistent 6 0% with next check  - POCT hemoglobin A1c    2  Benign essential hypertension  Elevated BP today  Stress? Continue lisinopril 20mgs  Recommend buying a bp machine for home  BP check at the office in 1 week    3  Callus  - Ambulatory referral to Podiatry; Future     BMI Counseling: Body mass index is 36 87 kg/m²  The BMI is above normal  Nutrition recommendations include consuming healthier snacks  No future appointments  SUBJECTIVE  CC: Diabetes (pt here for diabetic check)      HPI:  Sim Michael is a 63 y o  female who presents for a sugar check  Patient taking metformin from previous pre scriber  Was never given the dx by her former PCP  Patient very stressed at work  Working multiple jobs to help pay for her daughter dream wedding  Also  Peak time at her job therefore hours are increased  Callus of b/l feet bothering her would like to be referred  Review of Systems   Constitutional: Negative for activity change, appetite change, chills, fatigue and fever  HENT: Negative for congestion  Respiratory: Negative for cough, chest tightness and shortness of breath  Cardiovascular: Negative for chest pain and leg swelling  Gastrointestinal: Negative for abdominal distention, abdominal pain, constipation, diarrhea, nausea and vomiting  Musculoskeletal: Positive for arthralgias  All other systems reviewed and are negative        Historical Information   The patient history was reviewed as follows:  Past Medical History:   Diagnosis Date    Arthropathy     ONSET: [id number]    Cellulitis     ONSET: 17YXZ7718    Costochondritis     ONSET: 96DNL2728    Dermatitis     ONSET: 55PFK6130    Hemorrhagic detachment of retinal pigment epithelium     ONSET: 97GOR9395    Hypertension     LAST ASSESSED: 20BKA3000    Ingrown nail     LAST ASSESSED: 99IMF8759    Labyrinthitis     ONSET: 76YEB3880    Lymphadenopathy     ONSET: 10OCT2007    Myalgia     ONSET: 47FES9725    Myositis     ONSET: 38CXG0046    Nonallopathic lesion     ONSET: 92MJB7774    Shortness of breath     ONSET: 58FUM0650    Systemic lupus erythematosus (HCC)     LAST ASSESSED: 89OWK5071    Tinea corporis     ONSET: 49MPB2169    Vertigo     LAST ASSESSED: 66KDB1589         Medications:     Current Outpatient Medications:     aspirin (ECOTRIN LOW STRENGTH) 81 mg EC tablet, Take 81 mg by mouth daily, Disp: , Rfl:     atorvastatin (LIPITOR) 20 mg tablet, TAKE 1 TABLET BY MOUTH EVERY DAY, Disp: 90 tablet, Rfl: 0    Cholecalciferol (VITAMIN D3) 3000 units TABS, Take 1,000 Units by mouth 2 (two) times a day  , Disp: , Rfl:     Coenzyme Q10 (COQ10) 200 MG CAPS, Take 1 capsule by mouth daily, Disp: , Rfl:     ezetimibe (ZETIA) 10 mg tablet, TAKE 1 TABLET BY MOUTH EVERY DAY, Disp: 90 tablet, Rfl: 1    lisinopril (ZESTRIL) 20 mg tablet, TAKE 1 TABLET BY MOUTH EVERY DAY, Disp: 90 tablet, Rfl: 1    metFORMIN (GLUCOPHAGE) 500 mg tablet, TAKE 1 TABLET BY MOUTH TWICE A DAY, Disp: 180 tablet, Rfl: 1    Omega-3 Fatty Acids (FISH OIL) 1200 MG CAPS, Take 2 capsules by mouth 2 (two) times a day, Disp: , Rfl:     Allergies   Allergen Reactions    Other      Reaction Date: [id number]; Annotation - [id number]: rash   madlpn adhesive tape    Penicillins Rash     Reaction Date: [id number];  Annotation - [id number]: jjw       OBJECTIVE  Vitals:   Vitals:    01/12/21 1607   BP: 152/82   BP Location: Left arm   Patient Position: Sitting   Cuff Size: Standard   Pulse: 81   Resp: 16   Temp: 97 6 °F (36 4 °C)   TempSrc: Temporal   SpO2: 98%   Weight: 91 4 kg (201 lb 9 6 oz)   Height: 5' 2" (1 575 m) Physical Exam  Vitals signs reviewed  Constitutional:       Appearance: She is well-developed  HENT:      Head: Normocephalic and atraumatic  Eyes:      Conjunctiva/sclera: Conjunctivae normal       Pupils: Pupils are equal, round, and reactive to light  Neck:      Musculoskeletal: Normal range of motion and neck supple  Cardiovascular:      Rate and Rhythm: Normal rate and regular rhythm  Heart sounds: Normal heart sounds  Pulmonary:      Effort: Pulmonary effort is normal  No respiratory distress  Breath sounds: Normal breath sounds  Musculoskeletal: Normal range of motion  Comments: Callus present tender to touch of right sole of the foot  Left sole of the foot 2 present but non tender   Skin:     General: Skin is warm  Capillary Refill: Capillary refill takes less than 2 seconds  Neurological:      Mental Status: She is alert and oriented to person, place, and time                      Vickey Pugh MD,   [facility]  1/12/2021

## 2021-01-21 ENCOUNTER — OFFICE VISIT (OUTPATIENT)
Dept: FAMILY MEDICINE CLINIC | Facility: CLINIC | Age: 66
End: 2021-01-21
Payer: COMMERCIAL

## 2021-01-21 VITALS
SYSTOLIC BLOOD PRESSURE: 150 MMHG | OXYGEN SATURATION: 96 % | DIASTOLIC BLOOD PRESSURE: 84 MMHG | HEIGHT: 62 IN | HEART RATE: 86 BPM | BODY MASS INDEX: 37.28 KG/M2 | WEIGHT: 202.6 LBS | RESPIRATION RATE: 16 BRPM | TEMPERATURE: 97.6 F

## 2021-01-21 DIAGNOSIS — M54.41 ACUTE BILATERAL LOW BACK PAIN WITH RIGHT-SIDED SCIATICA: ICD-10-CM

## 2021-01-21 DIAGNOSIS — M85.80 OSTEOPENIA, UNSPECIFIED LOCATION: ICD-10-CM

## 2021-01-21 DIAGNOSIS — Z78.0 POST-MENOPAUSE: ICD-10-CM

## 2021-01-21 DIAGNOSIS — I10 BENIGN ESSENTIAL HYPERTENSION: Primary | ICD-10-CM

## 2021-01-21 PROCEDURE — 1036F TOBACCO NON-USER: CPT | Performed by: FAMILY MEDICINE

## 2021-01-21 PROCEDURE — 3077F SYST BP >= 140 MM HG: CPT | Performed by: FAMILY MEDICINE

## 2021-01-21 PROCEDURE — 3008F BODY MASS INDEX DOCD: CPT | Performed by: FAMILY MEDICINE

## 2021-01-21 PROCEDURE — 99214 OFFICE O/P EST MOD 30 MIN: CPT | Performed by: FAMILY MEDICINE

## 2021-01-21 PROCEDURE — 3079F DIAST BP 80-89 MM HG: CPT | Performed by: FAMILY MEDICINE

## 2021-01-21 RX ORDER — CYCLOBENZAPRINE HCL 5 MG
5 TABLET ORAL
Qty: 15 TABLET | Refills: 0 | Status: SHIPPED | OUTPATIENT
Start: 2021-01-21 | End: 2021-11-22

## 2021-01-21 RX ORDER — AMLODIPINE BESYLATE 2.5 MG/1
2.5 TABLET ORAL DAILY
Qty: 30 TABLET | Refills: 5 | Status: SHIPPED | OUTPATIENT
Start: 2021-01-21 | End: 2021-07-08

## 2021-01-21 NOTE — PROGRESS NOTES
Inna Walker 1955 female MRN: [id number]    [facility] [facility] - [address]      ASSESSMENT/PLAN  Inna Walker is a 63 y o  female presents to the office for    1  Benign essential hypertension  Currently continues to be uncontrolled  Started on amlodipine 2 5  Will be calling the patient in 1 week to see how the patient is doing  - amLODIPine (NORVASC) 2 5 mg tablet; Take 1 tablet (2 5 mg total) by mouth daily  Dispense: 30 tablet; Refill: 5    2  Acute bilateral low back pain with right-sided sciatica  Likely secondary to over usage  At the no need for imaging  if pain worsens would recommend calling our office or report directly to the emergency room  - cyclobenzaprine (FLEXERIL) 5 mg tablet; Take 1 tablet (5 mg total) by mouth daily at bedtime  Dispense: 15 tablet; Refill: 0    3  Osteopenia, unspecified location  - DXA bone density spine hip and pelvis; Future    4  Post-menopause  - DXA bone density spine hip and pelvis; Future     Return to the office in 1 month for blood pressure check  BMI Counseling: Body mass index is 37 06 kg/m²  The BMI is above normal  Nutrition recommendations include consuming healthier snacks  Future Appointments   Date Time Provider Kaylee Palacios   4/22/2021  5:45 PM Yoselyn Albert MD [facility]          SUBJECTIVE  CC: Blood Pressure Check (pt here for bp check, pt bent over yesterdy and know has low back pain) and Back Pain      HPI:  Inna Walker is a 63 y o  female who presents for an follow-up appointment  Patient with a history of hypertension S recently noted to be uncontrolled  Patient is kept a log at home  All blood pressures at home have been above 150/80 period consistent with the reading of today  She is taking all her medications as prescribed  She states that they have hired help for her at work therefore she should be less stress    Continues to be balancing 2 jobs to help a off her daughter's wedding  Patient started with an acute lower back pain that radiates down her right sciatic  Having no fecal or urinary incontinence  Has a history of low bone density has been several years since she has had a DEXA scan        Review of Systems   Constitutional: Negative for activity change, appetite change, chills, fatigue and fever  HENT: Negative for congestion  Respiratory: Negative for cough, chest tightness and shortness of breath  Cardiovascular: Negative for chest pain and leg swelling  Gastrointestinal: Negative for abdominal distention, abdominal pain, constipation, diarrhea, nausea and vomiting  Musculoskeletal: Positive for back pain  All other systems reviewed and are negative        Historical Information   The patient history was reviewed as follows:  Past Medical History:   Diagnosis Date    Arthropathy     ONSET: [id number]    Cellulitis     ONSET: 28YXF5701    Costochondritis     ONSET: 97SEI0528    Dermatitis     ONSET: 59CGA2297    Hemorrhagic detachment of retinal pigment epithelium     ONSET: 36LAV4139    Hypertension     LAST ASSESSED: 13NIH0785    Ingrown nail     LAST ASSESSED: 61MHB6136    Labyrinthitis     ONSET: 28UWN0567    Lymphadenopathy     ONSET: 37OLQ7737    Myalgia     ONSET: 45ISL4137    Myositis     ONSET: 36NFJ5397    Nonallopathic lesion     ONSET: 08QPM6036    Shortness of breath     ONSET: 98THI4600    Systemic lupus erythematosus ([facility] [address] )     LAST ASSESSED: 80GKW1298    Tinea corporis     ONSET: 15ZWY4543    Vertigo     LAST ASSESSED: 76KAQ9179         Medications:     Current Outpatient Medications:     aspirin (ECOTRIN LOW STRENGTH) 81 mg EC tablet, Take 81 mg by mouth daily, Disp: , Rfl:     atorvastatin (LIPITOR) 20 mg tablet, TAKE 1 TABLET BY MOUTH EVERY DAY, Disp: 90 tablet, Rfl: 0    Cholecalciferol (VITAMIN D3) 3000 units TABS, Take 1,000 Units by mouth 2 (two) times a day  , Disp: , Rfl:     Coenzyme Q10 (COQ10) 200 MG CAPS, Take 1 capsule by mouth daily, Disp: , Rfl:     ezetimibe (ZETIA) 10 mg tablet, TAKE 1 TABLET BY MOUTH EVERY DAY, Disp: 90 tablet, Rfl: 1    lisinopril (ZESTRIL) 20 mg tablet, TAKE 1 TABLET BY MOUTH EVERY DAY, Disp: 90 tablet, Rfl: 1    metFORMIN (GLUCOPHAGE) 500 mg tablet, TAKE 1 TABLET BY MOUTH TWICE A DAY, Disp: 180 tablet, Rfl: 1    Omega-3 Fatty Acids (FISH OIL) 1200 MG CAPS, Take 2 capsules by mouth 2 (two) times a day, Disp: , Rfl:     amLODIPine (NORVASC) 2 5 mg tablet, Take 1 tablet (2 5 mg total) by mouth daily, Disp: 30 tablet, Rfl: 5    cyclobenzaprine (FLEXERIL) 5 mg tablet, Take 1 tablet (5 mg total) by mouth daily at bedtime, Disp: 15 tablet, Rfl: 0    Allergies   Allergen Reactions    Other      Reaction Date: [id number]; Annotation - [id number]: rash   madlpn adhesive tape    Penicillins Rash     Reaction Date: [id number]; Annotation - [id number]: jjw       OBJECTIVE  Vitals:   Vitals:    01/21/21 1757   BP: 150/84   BP Location: Left arm   Patient Position: Sitting   Cuff Size: Large   Pulse: 86   Resp: 16   Temp: 97 6 °F (36 4 °C)   TempSrc: Temporal   SpO2: 96%   Weight: 91 9 kg (202 lb 9 6 oz)   Height: 5' 2" (1 575 m)         Physical Exam  Vitals signs reviewed  Constitutional:       Appearance: She is well-developed  HENT:      Head: Normocephalic and atraumatic  Eyes:      Conjunctiva/sclera: Conjunctivae normal       Pupils: Pupils are equal, round, and reactive to light  Neck:      Musculoskeletal: Normal range of motion and neck supple  Cardiovascular:      Rate and Rhythm: Normal rate and regular rhythm  Heart sounds: Normal heart sounds  Pulmonary:      Effort: Pulmonary effort is normal  No respiratory distress  Breath sounds: Normal breath sounds  Musculoskeletal: Normal range of motion  General: Tenderness (Si joint of the right buttock ) present  Skin:     General: Skin is warm        Capillary Refill: Capillary refill takes less than 2 seconds  Neurological:      Mental Status: She is alert and oriented to person, place, and time                      Fazal Warren MD,   [facility]  1/23/2021

## 2021-01-28 ENCOUNTER — TELEPHONE (OUTPATIENT)
Dept: FAMILY MEDICINE CLINIC | Facility: CLINIC | Age: 66
End: 2021-01-28

## 2021-01-28 DIAGNOSIS — Z12.11 ENCOUNTER FOR SCREENING COLONOSCOPY: Primary | ICD-10-CM

## 2021-01-28 DIAGNOSIS — L84 CALLUS: ICD-10-CM

## 2021-01-28 NOTE — TELEPHONE ENCOUNTER
----- Message from Henry Meyers MD sent at 1/21/2021  6:34 PM EST -----  Regarding: BP CHECK  Please call patient and ask how her Blood pressure is responding to Amlodipine 2 5mg

## 2021-01-28 NOTE — TELEPHONE ENCOUNTER
CALLED PT AND  [address] [facility], DR Nancy GÓMEZ, AND [facility] IN [address] ?  I THOUGHT THAT ONE WAS CLOSED, ONCE REFERRAL IS DONE PLEASE CALL PT W/ INFO

## 2021-01-28 NOTE — TELEPHONE ENCOUNTER
----- Message from Ricardo Epstein MD sent at 1/21/2021  6:34 PM EST -----  Regarding: BP CHECK  Please call patient and ask how her Blood pressure is responding to Amlodipine 2 5mg

## 2021-01-28 NOTE — TELEPHONE ENCOUNTER
CALLED PT AND HER BP HAS BEEN RUNNING 135/84 /84, SHE SAID SHE NEEDS A NEW REFERRAL TO [facility] BECAUSE DR TURNER ISN'T IN HER PLAN

## 2021-02-16 ENCOUNTER — HOSPITAL ENCOUNTER (OUTPATIENT)
Dept: RADIOLOGY | Facility: HOSPITAL | Age: 66
Discharge: HOME | End: 2021-02-16
Attending: FAMILY MEDICINE
Payer: COMMERCIAL

## 2021-02-16 DIAGNOSIS — Z78.0 POST-MENOPAUSE: ICD-10-CM

## 2021-02-16 DIAGNOSIS — M85.80 OSTEOPENIA, UNSPECIFIED LOCATION: ICD-10-CM

## 2021-02-16 PROCEDURE — 77080 DXA BONE DENSITY AXIAL: CPT

## 2021-02-23 ENCOUNTER — TELEPHONE (OUTPATIENT)
Dept: FAMILY MEDICINE CLINIC | Facility: CLINIC | Age: 66
End: 2021-02-23

## 2021-03-10 DIAGNOSIS — Z23 ENCOUNTER FOR IMMUNIZATION: ICD-10-CM

## 2021-03-20 ENCOUNTER — IMMUNIZATIONS (OUTPATIENT)
Dept: FAMILY MEDICINE CLINIC | Facility: HOSPITAL | Age: 66
End: 2021-03-20

## 2021-03-20 DIAGNOSIS — Z23 ENCOUNTER FOR IMMUNIZATION: Primary | ICD-10-CM

## 2021-03-20 PROCEDURE — 91300 SARS-COV-2 / COVID-19 MRNA VACCINE (PFIZER-BIONTECH) 30 MCG: CPT

## 2021-03-20 PROCEDURE — 0001A SARS-COV-2 / COVID-19 MRNA VACCINE (PFIZER-BIONTECH) 30 MCG: CPT

## 2021-03-26 DIAGNOSIS — I10 BENIGN ESSENTIAL HYPERTENSION: ICD-10-CM

## 2021-03-26 DIAGNOSIS — R73.01 IMPAIRED FASTING GLUCOSE: ICD-10-CM

## 2021-03-26 DIAGNOSIS — E78.5 HYPERLIPIDEMIA, UNSPECIFIED HYPERLIPIDEMIA TYPE: ICD-10-CM

## 2021-03-26 PROCEDURE — 4010F ACE/ARB THERAPY RXD/TAKEN: CPT | Performed by: FAMILY MEDICINE

## 2021-03-26 RX ORDER — EZETIMIBE 10 MG/1
TABLET ORAL
Qty: 90 TABLET | Refills: 1 | Status: SHIPPED | OUTPATIENT
Start: 2021-03-26 | End: 2021-09-22

## 2021-03-26 RX ORDER — LISINOPRIL 20 MG/1
TABLET ORAL
Qty: 90 TABLET | Refills: 1 | Status: SHIPPED | OUTPATIENT
Start: 2021-03-26 | End: 2021-09-22

## 2021-04-10 ENCOUNTER — IMMUNIZATIONS (OUTPATIENT)
Dept: FAMILY MEDICINE CLINIC | Facility: HOSPITAL | Age: 66
End: 2021-04-10

## 2021-04-10 DIAGNOSIS — Z23 ENCOUNTER FOR IMMUNIZATION: Primary | ICD-10-CM

## 2021-04-10 PROCEDURE — 0002A SARS-COV-2 / COVID-19 MRNA VACCINE (PFIZER-BIONTECH) 30 MCG: CPT

## 2021-04-10 PROCEDURE — 91300 SARS-COV-2 / COVID-19 MRNA VACCINE (PFIZER-BIONTECH) 30 MCG: CPT

## 2021-04-22 ENCOUNTER — OFFICE VISIT (OUTPATIENT)
Dept: FAMILY MEDICINE CLINIC | Facility: CLINIC | Age: 66
End: 2021-04-22
Payer: COMMERCIAL

## 2021-04-22 VITALS
HEIGHT: 62 IN | DIASTOLIC BLOOD PRESSURE: 72 MMHG | TEMPERATURE: 98.4 F | HEART RATE: 80 BPM | BODY MASS INDEX: 36.44 KG/M2 | WEIGHT: 198 LBS | RESPIRATION RATE: 16 BRPM | OXYGEN SATURATION: 98 % | SYSTOLIC BLOOD PRESSURE: 128 MMHG

## 2021-04-22 DIAGNOSIS — R29.6 FALLS FREQUENTLY: ICD-10-CM

## 2021-04-22 DIAGNOSIS — R26.9 GAIT DISTURBANCE: Primary | ICD-10-CM

## 2021-04-22 DIAGNOSIS — M85.852 OSTEOPENIA OF NECKS OF BOTH FEMURS: ICD-10-CM

## 2021-04-22 DIAGNOSIS — M85.851 OSTEOPENIA OF NECKS OF BOTH FEMURS: ICD-10-CM

## 2021-04-22 DIAGNOSIS — I10 ESSENTIAL HYPERTENSION: ICD-10-CM

## 2021-04-22 PROCEDURE — 1036F TOBACCO NON-USER: CPT | Performed by: FAMILY MEDICINE

## 2021-04-22 PROCEDURE — 99213 OFFICE O/P EST LOW 20 MIN: CPT | Performed by: FAMILY MEDICINE

## 2021-04-22 PROCEDURE — 3078F DIAST BP <80 MM HG: CPT | Performed by: FAMILY MEDICINE

## 2021-04-22 PROCEDURE — 3074F SYST BP LT 130 MM HG: CPT | Performed by: FAMILY MEDICINE

## 2021-04-22 PROCEDURE — 3008F BODY MASS INDEX DOCD: CPT | Performed by: FAMILY MEDICINE

## 2021-04-22 NOTE — PROGRESS NOTES
Konstantin Thomas 1955 female MRN: [id number]    [address] [facility] - [address]      ASSESSMENT/PLAN  Konstantin Thomas is a 63 y o  female presents to the office for    Diagnoses and all orders for this visit:    Gait disturbance  -     Ambulatory referral to Neurology; Future    Falls frequently  -     Ambulatory referral to Neurology; Future    Osteopenia of necks of both femurs  -     Comprehensive metabolic panel; Future    Essential hypertension  -     Comprehensive metabolic panel; Future        Hypertension very well controlled continue on all medications as prescribed  BMP in 6 months    Patient just recently his stated that she has been having frequent falls  During concerning since she has had injuries from these falls  Would like her to be evaluated by Neurology  new diagnosis osteopeniaAdvised the patient that her bone density came back showing low bone mass known as osteopenia  We will repeat this in 1-2 years  Would recommend that the patient start taking Oscal if she is not already taking least 1200 mg calcium and 800 to 1000 IU of Vitamin D    Brisk walking also helps with the patient bones  No future appointments  SUBJECTIVE  CC: Blood Pressure Check (patient here for BP check and review DExa) and Follow-up      HPI:  Konstantin Thomas is a 63 y o  female who presents for         One year ago the patient fell and when she watched the video she was tripping over her feet she does not recall losing her step  Now presently the patient fell at work came down a step and does not recall tripping over her own feet  Sprained her right wrist went to Urgent care  Goes back tomorrow for a repeat evaluation  Wide awake doesn't LOC  Patient falls frequently    BP Amlodipine 2 5mg; Lisinopril 20,g   Taking these medications without any side effects  Osteopenia  Patient would like to have the results    She never received a phone call  Review of Systems   Constitutional: Negative for activity change, appetite change, chills, fatigue and fever  HENT: Negative for congestion  Respiratory: Negative for cough, chest tightness and shortness of breath  Cardiovascular: Negative for chest pain and leg swelling  Gastrointestinal: Negative for abdominal distention, abdominal pain, constipation, diarrhea, nausea and vomiting  Musculoskeletal: Positive for gait problem  All other systems reviewed and are negative        Historical Information   The patient history was reviewed as follows:  Past Medical History:   Diagnosis Date    Arthropathy     ONSET: [id number]    Cellulitis     ONSET: [id number]    Costochondritis     ONSET: 23MAY2005    Dermatitis     ONSET: 60QMT1663    Hemorrhagic detachment of retinal pigment epithelium     ONSET: 86PCU9032    Hypertension     LAST ASSESSED: [id number]    Ingrown nail     LAST ASSESSED: [id number]    Labyrinthitis     ONSET: [id number]    Lymphadenopathy     ONSET: 10OCT2007    Myalgia     ONSET: 23MAY2005    Myositis     ONSET: 83LXT3283    Nonallopathic lesion     ONSET: 68VHT1001    Shortness of breath     ONSET: 25BHO8864    Systemic lupus erythematosus ([facility] [address] )     LAST ASSESSED: 57THU6949    Tinea corporis     ONSET: 44TXQ8666    Vertigo     LAST ASSESSED: 43WHU4815         Medications:     Current Outpatient Medications:     amLODIPine (NORVASC) 2 5 mg tablet, Take 1 tablet (2 5 mg total) by mouth daily, Disp: 30 tablet, Rfl: 5    aspirin (ECOTRIN LOW STRENGTH) 81 mg EC tablet, Take 81 mg by mouth daily, Disp: , Rfl:     atorvastatin (LIPITOR) 20 mg tablet, TAKE 1 TABLET BY MOUTH EVERY DAY, Disp: 90 tablet, Rfl: 0    Cholecalciferol (VITAMIN D3) 3000 units TABS, Take 1,000 Units by mouth 2 (two) times a day  , Disp: , Rfl:     Coenzyme Q10 (COQ10) 200 MG CAPS, Take 1 capsule by mouth daily, Disp: , Rfl:     ezetimibe (ZETIA) 10 mg tablet, TAKE 1 TABLET BY MOUTH EVERY DAY, Disp: 90 tablet, Rfl: 1    lisinopril (ZESTRIL) 20 mg tablet, TAKE 1 TABLET BY MOUTH EVERY DAY, Disp: 90 tablet, Rfl: 1    metFORMIN (GLUCOPHAGE) 500 mg tablet, TAKE 1 TABLET BY MOUTH TWICE A DAY, Disp: 180 tablet, Rfl: 1    Omega-3 Fatty Acids (FISH OIL) 1200 MG CAPS, Take 2 capsules by mouth 2 (two) times a day, Disp: , Rfl:     cyclobenzaprine (FLEXERIL) 5 mg tablet, Take 1 tablet (5 mg total) by mouth daily at bedtime (Patient not taking: Reported on 4/22/2021), Disp: 15 tablet, Rfl: 0    Allergies   Allergen Reactions    Other      Reaction Date: [id number]; Annotation - [id number]: rash   madlpn adhesive tape    Penicillins Rash     Reaction Date: [id number]; Annotation - [id number]: jjw       OBJECTIVE  Vitals:   Vitals:    04/22/21 1749   BP: 128/72   BP Location: Left arm   Patient Position: Sitting   Cuff Size: Standard   Pulse: 80   Resp: 16   Temp: 98 4 °F (36 9 °C)   TempSrc: Temporal   SpO2: 98%   Weight: 89 8 kg (198 lb)   Height: 5' 2" (1 575 m)         Physical Exam  Vitals signs reviewed  Constitutional:       Appearance: She is well-developed  HENT:      Head: Normocephalic and atraumatic  Eyes:      Conjunctiva/sclera: Conjunctivae normal       Pupils: Pupils are equal, round, and reactive to light  Neck:      Musculoskeletal: Normal range of motion and neck supple  Cardiovascular:      Rate and Rhythm: Normal rate and regular rhythm  Heart sounds: Normal heart sounds  Pulmonary:      Effort: Pulmonary effort is normal  No respiratory distress  Breath sounds: Normal breath sounds  Musculoskeletal: Normal range of motion  Comments: Right wrist splint in place being managed by urgent care  Skin:     General: Skin is warm  Capillary Refill: Capillary refill takes less than 2 seconds  Neurological:      Mental Status: She is alert and oriented to person, place, and time                      [facility]  4/22/2021

## 2021-05-20 ENCOUNTER — TELEPHONE (OUTPATIENT)
Dept: FAMILY MEDICINE CLINIC | Facility: CLINIC | Age: 66
End: 2021-05-20

## 2021-05-20 DIAGNOSIS — M32.9 SYSTEMIC LUPUS ERYTHEMATOSUS, UNSPECIFIED SLE TYPE, UNSPECIFIED ORGAN INVOLVEMENT STATUS (HCC): Primary | ICD-10-CM

## 2021-05-20 RX ORDER — HYDROXYCHLOROQUINE SULFATE 200 MG/1
200 TABLET, FILM COATED ORAL 2 TIMES DAILY WITH MEALS
Qty: 60 TABLET | Refills: 2 | Status: SHIPPED | OUTPATIENT
Start: 2021-05-20 | End: 2022-03-03

## 2021-05-20 NOTE — TELEPHONE ENCOUNTER
[facility] PT AND ADVISED HER OF THE PLAQUENIL AND SHE WILL COME IN FOR BLOOD WORK AND SHE ALREADY SEE'S DR Heather Rose 1948 AND AN EYE SURGEON, [facility] ON [address]

## 2021-05-20 NOTE — TELEPHONE ENCOUNTER
CALLED PATIENT SHE DOES NOT REMEMBER THE NAME/S AND SHE SAID SHE IS HAVING MUSCLE SPASMS IN HER LEGS AGAIN AND THAT'S WHAT MADE HER REMEMBER THAT SHE HASN'T BEEN ON ANYTHING

## 2021-05-20 NOTE — TELEPHONE ENCOUNTER
Dr Zafar Finney pt has not been  taking any meds for her lupus and needs a script for it   Pt has not had any meds since elma was hear

## 2021-05-20 NOTE — TELEPHONE ENCOUNTER
Hydroxychloroquine Sulfate 200 MG Oral Tablet; TAKE 1 TABLET TWICE DAILY WITH   FOOD; is what Jarad Hall use to prescribe her  Please advise her this medications needs BW every 6 months  AND an Eye exam by an ophthalmologist  given that can cause eye deformities  If patient is willing to see the eye physician every 6 months and get blood work every 6 months which I know she already comes in every 6 months to see me I can represcribed for her

## 2021-07-08 DIAGNOSIS — I10 BENIGN ESSENTIAL HYPERTENSION: ICD-10-CM

## 2021-07-08 RX ORDER — AMLODIPINE BESYLATE 2.5 MG/1
2.5 TABLET ORAL DAILY
Qty: 90 TABLET | Refills: 1 | Status: SHIPPED | OUTPATIENT
Start: 2021-07-08 | End: 2022-03-03 | Stop reason: SDUPTHER

## 2021-07-08 RX ORDER — AMLODIPINE BESYLATE 2.5 MG/1
TABLET ORAL
Qty: 90 TABLET | Refills: 1 | Status: SHIPPED | OUTPATIENT
Start: 2021-07-08 | End: 2021-07-08 | Stop reason: SDUPTHER

## 2021-08-18 LAB
ALBUMIN SERPL-MCNC: 3.8 G/DL (ref 3.8–4.8)
ALBUMIN/GLOB SERPL: 1.4 {RATIO} (ref 1.2–2.2)
ALP SERPL-CCNC: 61 IU/L (ref 48–121)
ALT SERPL-CCNC: 16 IU/L (ref 0–32)
AST SERPL-CCNC: 17 IU/L (ref 0–40)
BILIRUB SERPL-MCNC: 0.4 MG/DL (ref 0–1.2)
BUN SERPL-MCNC: 12 MG/DL (ref 8–27)
BUN/CREAT SERPL: 18 (ref 12–28)
CALCIUM SERPL-MCNC: 9.2 MG/DL (ref 8.7–10.3)
CHLORIDE SERPL-SCNC: 104 MMOL/L (ref 96–106)
CO2 SERPL-SCNC: 28 MMOL/L (ref 20–29)
CREAT SERPL-MCNC: 0.65 MG/DL (ref 0.57–1)
GLOBULIN SER-MCNC: 2.8 G/DL (ref 1.5–4.5)
GLUCOSE SERPL-MCNC: 111 MG/DL (ref 65–99)
POTASSIUM SERPL-SCNC: 5 MMOL/L (ref 3.5–5.2)
PROT SERPL-MCNC: 6.6 G/DL (ref 6–8.5)
SL AMB EGFR AFRICAN AMERICAN: 109 ML/MIN/1.73
SL AMB EGFR NON AFRICAN AMERICAN: 95 ML/MIN/1.73
SODIUM SERPL-SCNC: 143 MMOL/L (ref 134–144)

## 2021-08-19 ENCOUNTER — TELEPHONE (OUTPATIENT)
Dept: FAMILY MEDICINE CLINIC | Facility: CLINIC | Age: 66
End: 2021-08-19

## 2021-08-19 NOTE — TELEPHONE ENCOUNTER
----- Message from Clayton Maciel MD sent at 8/19/2021 12:53 PM EDT -----  BW stable, Caution with sugars  We will discuss the glucose at our appointment in October

## 2021-09-22 DIAGNOSIS — E78.5 HYPERLIPIDEMIA, UNSPECIFIED HYPERLIPIDEMIA TYPE: ICD-10-CM

## 2021-09-22 DIAGNOSIS — I10 BENIGN ESSENTIAL HYPERTENSION: ICD-10-CM

## 2021-09-22 DIAGNOSIS — R73.01 IMPAIRED FASTING GLUCOSE: ICD-10-CM

## 2021-09-22 RX ORDER — EZETIMIBE 10 MG/1
TABLET ORAL
Qty: 90 TABLET | Refills: 1 | Status: SHIPPED | OUTPATIENT
Start: 2021-09-22 | End: 2022-03-03 | Stop reason: SDUPTHER

## 2021-09-22 RX ORDER — LISINOPRIL 20 MG/1
TABLET ORAL
Qty: 90 TABLET | Refills: 1 | Status: SHIPPED | OUTPATIENT
Start: 2021-09-22 | End: 2022-03-03 | Stop reason: SDUPTHER

## 2021-11-22 ENCOUNTER — OFFICE VISIT (OUTPATIENT)
Dept: FAMILY MEDICINE CLINIC | Facility: CLINIC | Age: 66
End: 2021-11-22
Payer: COMMERCIAL

## 2021-11-22 VITALS
BODY MASS INDEX: 37.17 KG/M2 | OXYGEN SATURATION: 97 % | HEART RATE: 84 BPM | DIASTOLIC BLOOD PRESSURE: 80 MMHG | TEMPERATURE: 98.2 F | HEIGHT: 62 IN | SYSTOLIC BLOOD PRESSURE: 140 MMHG | RESPIRATION RATE: 16 BRPM | WEIGHT: 202 LBS

## 2021-11-22 DIAGNOSIS — R25.2 LEG CRAMPS: ICD-10-CM

## 2021-11-22 DIAGNOSIS — R73.03 PRE-DIABETES: ICD-10-CM

## 2021-11-22 DIAGNOSIS — Z12.31 SCREENING MAMMOGRAM FOR BREAST CANCER: ICD-10-CM

## 2021-11-22 DIAGNOSIS — Z12.11 COLON CANCER SCREENING: ICD-10-CM

## 2021-11-22 DIAGNOSIS — I10 BENIGN ESSENTIAL HYPERTENSION: Primary | ICD-10-CM

## 2021-11-22 DIAGNOSIS — E78.5 HYPERLIPIDEMIA, UNSPECIFIED HYPERLIPIDEMIA TYPE: ICD-10-CM

## 2021-11-22 PROCEDURE — 3725F SCREEN DEPRESSION PERFORMED: CPT | Performed by: FAMILY MEDICINE

## 2021-11-22 PROCEDURE — 1036F TOBACCO NON-USER: CPT | Performed by: FAMILY MEDICINE

## 2021-11-22 PROCEDURE — 3008F BODY MASS INDEX DOCD: CPT | Performed by: FAMILY MEDICINE

## 2021-11-22 PROCEDURE — 3079F DIAST BP 80-89 MM HG: CPT | Performed by: FAMILY MEDICINE

## 2021-11-22 PROCEDURE — 3077F SYST BP >= 140 MM HG: CPT | Performed by: FAMILY MEDICINE

## 2021-11-22 PROCEDURE — 99214 OFFICE O/P EST MOD 30 MIN: CPT | Performed by: FAMILY MEDICINE

## 2021-12-15 LAB
BUN SERPL-MCNC: 9 MG/DL (ref 8–27)
BUN/CREAT SERPL: 13 (ref 12–28)
CALCIUM SERPL-MCNC: 8.8 MG/DL (ref 8.7–10.3)
CHLORIDE SERPL-SCNC: 105 MMOL/L (ref 96–106)
CO2 SERPL-SCNC: 26 MMOL/L (ref 20–29)
CREAT SERPL-MCNC: 0.67 MG/DL (ref 0.57–1)
GLUCOSE SERPL-MCNC: 110 MG/DL (ref 65–99)
HBA1C MFR BLD: 6.1 % (ref 4.8–5.6)
POTASSIUM SERPL-SCNC: 4.9 MMOL/L (ref 3.5–5.2)
SL AMB EGFR AFRICAN AMERICAN: 107 ML/MIN/1.73
SL AMB EGFR NON AFRICAN AMERICAN: 93 ML/MIN/1.73
SODIUM SERPL-SCNC: 143 MMOL/L (ref 134–144)

## 2021-12-15 PROCEDURE — 3044F HG A1C LEVEL LT 7.0%: CPT | Performed by: FAMILY MEDICINE

## 2022-01-19 ENCOUNTER — TELEPHONE (OUTPATIENT)
Dept: GASTROENTEROLOGY | Facility: CLINIC | Age: 67
End: 2022-01-19

## 2022-01-19 ENCOUNTER — HOSPITAL ENCOUNTER (OUTPATIENT)
Dept: MAMMOGRAPHY | Facility: HOSPITAL | Age: 67
Discharge: HOME | End: 2022-01-19
Payer: COMMERCIAL

## 2022-01-19 VITALS — BODY MASS INDEX: 37.16 KG/M2 | WEIGHT: 201.94 LBS | HEIGHT: 62 IN

## 2022-01-19 DIAGNOSIS — Z12.11 COLON CANCER SCREENING: ICD-10-CM

## 2022-01-19 PROCEDURE — 77063 BREAST TOMOSYNTHESIS BI: CPT

## 2022-01-19 PROCEDURE — 77067 SCR MAMMO BI INCL CAD: CPT

## 2022-01-19 NOTE — TELEPHONE ENCOUNTER
Scheduled date of EGD/colonoscopy (as of today): 2/23/22  Physician performing EGD/colonoscopy: des  Location of EGD/colonoscopy: [facility]  Desired bowel prep reviewed with patient: geo edl val[facility]  Instructions reviewed with patient by: maria del carmen  Clearances:  n/a

## 2022-02-23 ENCOUNTER — ANESTHESIA EVENT (OUTPATIENT)
Dept: GASTROENTEROLOGY | Facility: AMBULATORY SURGERY CENTER | Age: 67
End: 2022-02-23

## 2022-02-23 ENCOUNTER — ANESTHESIA (OUTPATIENT)
Dept: GASTROENTEROLOGY | Facility: AMBULATORY SURGERY CENTER | Age: 67
End: 2022-02-23

## 2022-02-23 ENCOUNTER — HOSPITAL ENCOUNTER (OUTPATIENT)
Dept: GASTROENTEROLOGY | Facility: AMBULATORY SURGERY CENTER | Age: 67
Discharge: HOME | End: 2022-02-23
Payer: COMMERCIAL

## 2022-02-23 VITALS
OXYGEN SATURATION: 99 % | RESPIRATION RATE: 18 BRPM | WEIGHT: 198 LBS | HEART RATE: 67 BPM | TEMPERATURE: 97 F | BODY MASS INDEX: 37.38 KG/M2 | HEIGHT: 61 IN | DIASTOLIC BLOOD PRESSURE: 74 MMHG | SYSTOLIC BLOOD PRESSURE: 136 MMHG

## 2022-02-23 DIAGNOSIS — Z86.010 HISTORY OF COLON POLYPS: ICD-10-CM

## 2022-02-23 DIAGNOSIS — K22.719 BARRETT'S ESOPHAGUS WITH DYSPLASIA: ICD-10-CM

## 2022-02-23 PROCEDURE — 00813 ANES UPR LWR GI NDSC PX: CPT | Performed by: NURSE ANESTHETIST, CERTIFIED REGISTERED

## 2022-02-23 PROCEDURE — 43251 EGD REMOVE LESION SNARE: CPT | Performed by: INTERNAL MEDICINE

## 2022-02-23 PROCEDURE — G0121 COLON CA SCRN NOT HI RSK IND: HCPCS | Performed by: INTERNAL MEDICINE

## 2022-02-23 PROCEDURE — 43239 EGD BIOPSY SINGLE/MULTIPLE: CPT | Performed by: INTERNAL MEDICINE

## 2022-02-23 RX ORDER — SODIUM CHLORIDE 9 MG/ML
20 INJECTION, SOLUTION INTRAVENOUS CONTINUOUS
Status: DISCONTINUED | OUTPATIENT
Start: 2022-02-23 | End: 2022-02-27 | Stop reason: HOSPADM

## 2022-02-23 RX ORDER — PROPOFOL 10 MG/ML
INJECTION, EMULSION INTRAVENOUS AS NEEDED
Status: DISCONTINUED | OUTPATIENT
Start: 2022-02-23 | End: 2022-02-23

## 2022-02-23 RX ORDER — SODIUM CHLORIDE 9 MG/ML
30 INJECTION, SOLUTION INTRAVENOUS CONTINUOUS
Status: DISCONTINUED | OUTPATIENT
Start: 2022-02-23 | End: 2022-02-27 | Stop reason: HOSPADM

## 2022-02-23 RX ORDER — LIDOCAINE HYDROCHLORIDE 20 MG/ML
INJECTION, SOLUTION EPIDURAL; INFILTRATION; INTRACAUDAL; PERINEURAL AS NEEDED
Status: DISCONTINUED | OUTPATIENT
Start: 2022-02-23 | End: 2022-02-23

## 2022-02-23 RX ADMIN — PROPOFOL 30 MG: 10 INJECTION, EMULSION INTRAVENOUS at 09:10

## 2022-02-23 RX ADMIN — PROPOFOL 50 MG: 10 INJECTION, EMULSION INTRAVENOUS at 09:12

## 2022-02-23 RX ADMIN — SODIUM CHLORIDE: 9 INJECTION, SOLUTION INTRAVENOUS at 09:05

## 2022-02-23 RX ADMIN — LIDOCAINE HYDROCHLORIDE 50 MG: 20 INJECTION, SOLUTION EPIDURAL; INFILTRATION; INTRACAUDAL; PERINEURAL at 09:08

## 2022-02-23 RX ADMIN — PROPOFOL 50 MG: 10 INJECTION, EMULSION INTRAVENOUS at 09:20

## 2022-02-23 RX ADMIN — PROPOFOL 50 MG: 10 INJECTION, EMULSION INTRAVENOUS at 09:15

## 2022-02-23 RX ADMIN — PROPOFOL 120 MG: 10 INJECTION, EMULSION INTRAVENOUS at 09:08

## 2022-02-23 RX ADMIN — PROPOFOL 50 MG: 10 INJECTION, EMULSION INTRAVENOUS at 09:23

## 2022-02-23 NOTE — DISCHARGE INSTRUCTIONS
Upper Endoscopy and Colonoscopy   WHAT YOU NEED TO KNOW:   An upper endoscopy is also called an upper gastrointestinal (GI) endoscopy, or an esophagogastroduodenoscopy (EGD)  It is a procedure to examine the inside of your esophagus, stomach, and duodenum (first part of the small intestine) with a scope  You may feel bloated, gassy, or have some abdominal discomfort after your procedure  Your throat may be sore for 24 to 36 hours  You may burp or pass gas from air that is still inside your body  A colonoscopy is a procedure to examine the inside of your colon (intestine) with a scope  Polyps or tissue growths may have been removed during your colonoscopy  It is normal to feel bloated and to have some abdominal discomfort  You should be passing gas  If you have hemorrhoids or you had polyps removed, you may have a small amount of bleeding  DISCHARGE INSTRUCTIONS:   Seek care immediately if:   · You have sudden, severe abdominal pain  · You have problems swallowing  · You have a large amount of black, sticky bowel movements or blood in your bowel movements  · You have sudden trouble breathing  · You feel weak, lightheaded, or faint or your heart beats faster than normal for you  Contact your healthcare provider if:   · You have a fever and chills  · You have nausea or are vomiting  · Your abdomen is bloated or feels full and hard  · You have abdominal pain  · You have a large amount of black, sticky bowel movements or blood in your bowel movements  · You have not had a bowel movement for 3 days after your procedure  · You have rash or hives  · You have questions or concerns about your procedure  Activity:   ·       Do not lift, strain, or run for 24 hours after your procedure  ·       Rest after your procedure  You have been given medicine to relax you  Do not drive or make important decisions until the day after your procedure   Return to your normal activity as directed  ·       Relieve gas and discomfort from bloating by lying on your right side with a heating pad on your abdomen  You may need to take short walks to help the gas move out  Eat small meals until bloating is relieved  Follow up with your healthcare provider as directed: Write down your questions so you remember to ask them during your visits  If you take a blood thinner, please review the specific instructions from your endoscopist about when you should resume it  These can be found in the Recommendation and Your Medication list sections of this After Visit Summary  High Fiber Diet   WHAT YOU NEED TO KNOW:   What is a high-fiber diet? A high-fiber diet includes foods that have a high amount of fiber  Fiber is the part of fruits, vegetables, and grains that is not broken down by your body  Fiber keeps your bowel movements regular  Fiber can also help lower your cholesterol level, control blood sugar in people with diabetes, and relieve constipation  Fiber can also help you control your weight because it helps you feel full faster  Most adults should eat 25 to 35 grams of fiber each day  Talk to your dietitian or healthcare provider about the amount of fiber you need  What foods are good sources of fiber? · Foods with at least 4 grams of fiber per serving:      ? ? to ½ cup of high-fiber cereal (check the nutrition label on the box)    ? ½ cup of blackberries or raspberries    ? 4 dried prunes    ? 1 cooked artichoke    ? ½ cup of cooked legumes, such as lentils, or red, kidney, and rowley beans    · Foods with 1 to 3 grams of fiber per serving:      ? 1 slice of whole-wheat, pumpernickel, or rye bread    ? ½ cup of cooked brown rice    ? 4 whole-wheat crackers    ? 1 cup of oatmeal    ? ½ cup of cereal with 1 to 3 grams of fiber per serving (check the nutrition label on the box)    ? 1 small piece of fruit, such as an apple, banana, pear, kiwi, or orange    ?  3 dates    ? ½ cup of canned apricots, fruit cocktail, peaches, or pears    ? ½ cup of raw or cooked vegetables, such as carrots, cauliflower, cabbage, spinach, squash, or corn    What are some ways that I can increase fiber in my diet? · Choose brown or wild rice instead of white rice  · Use whole wheat flour in recipes instead of white or all-purpose flour  · Add beans and peas to casseroles or soups  · Choose fresh fruit and vegetables with peels or skins on instead of juices  What other guidelines should I follow? · Add fiber to your diet slowly  You may have abdominal discomfort, bloating, and gas if you add fiber to your diet too quickly  · Drink plenty of liquids as you add fiber to your diet  You may have nausea or develop constipation if you do not drink enough water  Ask how much liquid to drink each day and which liquids are best for you  CARE AGREEMENT:   You have the right to help plan your care  Discuss treatment options with your healthcare provider to decide what care you want to receive  You always have the right to refuse treatment  The above information is an  only  It is not intended as medical advice for individual conditions or treatments  Talk to your doctor, nurse or pharmacist before following any medical regimen to see if it is safe and effective for you  © Copyright [employer] 2021 Information is for End User's use only and may not be sold, redistributed or otherwise used for commercial purposes  All illustrations and images included in CareNotes® are the copyrighted property of A D A M , Inc  or Sushila Rois   Diverticulosis   WHAT YOU NEED TO KNOW:   What is diverticulosis? Diverticulosis is a condition that causes small pockets called diverticula to form in your intestine  These pockets make it difficult for bowel movements to pass through your digestive system  What causes diverticulosis?   Diverticula form when muscles have to work hard to move bowel movements through the intestine  The force causes bulges to form at weak areas in the intestine  This may happen if you eat foods that are low in fiber  Fiber helps give your bowel movements more bulk so they are larger and easier to move through your colon  The following may increase your risk of diverticulosis:  · A history of constipation    · Age 36 or older    · Obesity    · Lack of exercise    What are the signs and symptoms of diverticulosis? Diverticulosis usually does not cause any signs or symptoms  It may cause any of the following in some people:  · Pain or discomfort in your lower abdomen    · Abdominal bloating    · Constipation or diarrhea    How is diverticulosis diagnosed? Your healthcare provider will examine you and ask about your bowel movements, diet, and symptoms  He or she will also ask about any medical conditions you have or medicines you take  You may need any of the following:  · Blood tests  may be done to check for signs of inflammation  · A barium enema  is an x-ray of your colon that may show diverticula  A tube is put into your anus, and a liquid called barium is put through the tube  Barium is used so that healthcare providers can see your colon more clearly  · Flexible sigmoidoscopy  is a test to look for any changes in your lower intestines and rectum  It may also show the cause of any bleeding or pain  A soft, bendable tube with a light on the end will be put into your anus  It will then be moved forward into your intestine  · A colonoscopy  is used to look at your whole colon  A scope (long bendable tube with a light on the end) is used to take pictures  This test may show diverticula  · A CT scan , or CAT scan, may show diverticula  You may be given contrast liquid before the scan  Tell the healthcare provider if you have ever had an allergic reaction to contrast liquid  How is diverticulosis managed?   The goal of treatment is to manage any symptoms you have and prevent other problems such as diverticulitis  Diverticulitis is swelling or infection of the diverticula  Your healthcare provider may recommend any of the following:  · Eat a variety of high-fiber foods  High-fiber foods help you have regular bowel movements  High-fiber foods include cooked beans, fruits, vegetables, and some cereals  Most adults need 25 to 35 grams of fiber each day  Your healthcare provider may recommend that you have more  Ask your healthcare provider how much fiber you need  Increase fiber slowly  You may have abdominal discomfort, bloating, and gas if you add fiber to your diet too quickly  You may need to take a fiber supplement if you are not getting enough fiber from food  · Medicines  to soften your bowel movements may be given  You may also need medicines to treat symptoms such as bloating and pain  · Drink liquids as directed  You may need to drink 2 to 3 liters (8 to 12 cups) of liquids every day  Ask your healthcare provider how much liquid to drink each day and which liquids are best for you  · Apply heat  on your abdomen for 20 to 30 minutes every 2 hours for as many days as directed  Heat helps decrease pain and muscle spasms  How can I help prevent diverticulitis or other symptoms? The following may help decrease your risk for diverticulitis or symptoms, such as bleeding  Talk to your provider about these or other things you can do to prevent problems that may occur with diverticulosis  · Exercise regularly  Ask your healthcare provider about the best exercise plan for you  Exercise can help you have regular bowel movements  Get 30 minutes of exercise on most days of the week  · Maintain a healthy weight  Ask your healthcare provider what a healthy weight is for you  Ask him or her to help you create a weight loss plan if you are overweight  · Do not smoke    Nicotine and other chemicals in cigarettes increase your risk for diverticulitis  Ask your healthcare provider for information if you currently smoke and need help to quit  E-cigarettes or smokeless tobacco still contain nicotine  Talk to your healthcare provider before you use these products  · Ask your healthcare provider if it is safe to take NSAIDs  NSAIDs may increase your risk of diverticulitis  When should I seek immediate care? · You have severe pain on the left side of your lower abdomen  · Your bowel movements are bright or dark red  When should I call my doctor? · You have a fever and chills  · You feel dizzy or lightheaded  · You have nausea, or you are vomiting  · You have a change in your bowel movements  · You have questions or concerns about your condition or care  CARE AGREEMENT:   You have the right to help plan your care  Learn about your health condition and how it may be treated  Discuss treatment options with your healthcare providers to decide what care you want to receive  You always have the right to refuse treatment  The above information is an  only  It is not intended as medical advice for individual conditions or treatments  Talk to your doctor, nurse or pharmacist before following any medical regimen to see if it is safe and effective for you  © Copyright [employer] 2021 Information is for End User's use only and may not be sold, redistributed or otherwise used for commercial purposes  All illustrations and images included in CareNotes® are the copyrighted property of [employer] A [employer]  or [address] [place] [address]  GERD (Gastroesophageal Reflux Disease)   WHAT YOU NEED TO KNOW:   Gastroesophageal reflux disease (GERD) is reflux that occurs more than twice a week for a few weeks  Reflux means acid and food in the stomach back up into the esophagus  It usually causes heartburn and other symptoms  GERD can cause other health problems over time if it is not treated         DISCHARGE INSTRUCTIONS:   Call your local emergency number (911 in the [address]) if:   · You have severe chest pain and sudden trouble breathing  Seek care immediately if:   · You have trouble breathing after you vomit  · You have trouble swallowing, or pain with swallowing  · Your bowel movements are black, bloody, or tarry-looking  · Your vomit looks like coffee grounds or has blood in it  Call your doctor or gastroenterologist if:   · You feel full and cannot burp or vomit  · You vomit large amounts, or you vomit often  · You are losing weight without trying  · Your symptoms get worse or do not improve with treatment  · You have questions or concerns about your condition or care  Medicines:   · Medicines  are used to decrease stomach acid  Medicine may also be used to help your lower esophageal sphincter and stomach contract (tighten) more  · Take your medicine as directed  Contact your healthcare provider if you think your medicine is not helping or if you have side effects  Tell him or her if you are allergic to any medicine  Keep a list of the medicines, vitamins, and herbs you take  Include the amounts, and when and why you take them  Bring the list or the pill bottles to follow-up visits  Carry your medicine list with you in case of an emergency  Manage GERD:       · Do not have foods or drinks that may increase heartburn  These include chocolate, peppermint, fried or fatty foods, drinks that contain caffeine, or carbonated drinks (soda)  Other foods include spicy foods, onions, tomatoes, and tomato-based foods  Do not have foods or drinks that can irritate your esophagus, such as citrus fruits, juices, and alcohol  · Do not eat large meals  When you eat a lot of food at one time, your stomach needs more acid to digest it  Eat 6 small meals each day instead of 3 large ones, and eat slowly  Do not eat meals 2 to 3 hours before bedtime  · Elevate the head of your bed    Place 6-inch blocks under the head of your bed frame  You may also use more than one pillow under your head and shoulders while you sleep  · Maintain a healthy weight  If you are overweight, weight loss may help relieve symptoms of GERD  · Do not smoke  Smoking weakens the lower esophageal sphincter and increases the risk of GERD  Ask your healthcare provider for information if you currently smoke and need help to quit  E-cigarettes or smokeless tobacco still contain nicotine  Talk to your healthcare provider before you use these products  · Do not wear clothing that is tight around your waist   Tight clothing can put pressure on your stomach and cause or worsen GERD symptoms  Follow up with your doctor or gastroenterologist as directed:  Write down your questions so you remember to ask them during your visits  © Copyright [employer] 2021 Information is for End User's use only and may not be sold, redistributed or otherwise used for commercial purposes  All illustrations and images included in CareNotes® are the copyrighted property of A D A M , Inc  or [employer]   The above information is an  only  It is not intended as medical advice for individual conditions or treatments  Talk to your doctor, nurse or pharmacist before following any medical regimen to see if it is safe and effective for you  Gastric Polyps   WHAT YOU NEED TO KNOW:   Gastric polyps are growths that form in the lining of your stomach  They are not cancerous, but certain types of polyps can change into cancer  DISCHARGE INSTRUCTIONS:   Follow up with your healthcare provider as directed: You may need more tests or procedures  Write down any questions so you remember to ask them at your visits  Medicines:   · Medicines may  be given if you have an infection caused by H  pylori bacteria  · Take your medicine as directed  Contact your healthcare provider if you think your medicine is not helping or if you have side effects   Tell him or her if you are allergic to any medicine  Keep a list of the medicines, vitamins, and herbs you take  Include the amounts, and when and why you take them  Bring the list or the pill bottles to follow-up visits  Carry your medicine list with you in case of an emergency  Seek care immediately if:   · You have blood in your vomit  · You have dark bowel movements  · You have severe pain in your abdomen that does not go away after you take medicine  Contact your healthcare provider if:   · You have indigestion that does not go away with treatment  · You vomit after meals  · You have questions or concerns about your condition or care  © Copyright [employer] 2021 Information is for End User's use only and may not be sold, redistributed or otherwise used for commercial purposes  All illustrations and images included in CareNotes® are the copyrighted property of A D A [employer]  or Sushila Atkins  The above information is an  only  It is not intended as medical advice for individual conditions or treatments  Talk to your doctor, nurse or pharmacist before following any medical regimen to see if it is safe and effective for you

## 2022-02-23 NOTE — ANESTHESIA PREPROCEDURE EVALUATION
Procedure:  COLONOSCOPY  EGD    Relevant Problems   ANESTHESIA (within normal limits)      CARDIO   (+) Benign essential hypertension   (+) Hyperlipidemia      ENDO (within normal limits)      GI/HEPATIC (within normal limits)      /RENAL (within normal limits)      GYN (within normal limits)      HEMATOLOGY (within normal limits)      MUSCULOSKELETAL   (+) Arthritis   (+) Systemic lupus erythematosus (HCC)      NEURO/PSYCH (within normal limits)      PULMONARY (within normal limits)      Other   (+) Diabetes mellitus (HCC)   (+) Lymphadenopathy   (+) MTHFR mutation   (+) Myalgia   (+) Osteopenia   (+) Thrombocytosis   (+) Vertigo        Physical Exam    Airway    Mallampati score: III  TM Distance: >3 FB  Neck ROM: full     Dental   upper dentures,     Cardiovascular  Cardiovascular exam normal    Pulmonary  Pulmonary exam normal     Other Findings        Anesthesia Plan  ASA Score- 2     Anesthesia Type- IV sedation with anesthesia with ASA Monitors  Additional Monitors:   Airway Plan:           Plan Factors-Exercise tolerance (METS): >4 METS  Chart reviewed  Patient is not a current smoker  Induction- intravenous  Postoperative Plan-     Informed Consent- Anesthetic plan and risks discussed with patient

## 2022-02-23 NOTE — H&P
History and Physical -  [facility]  Russell Ayon 61 y o  female MRN: [id number]                  HPI: Russell Ayon is a 61y o  year old female who presents for history of GERD Hendrix's and polyps      REVIEW OF SYSTEMS: Per the HPI, and otherwise unremarkable      Historical Information   Past Medical History:   Diagnosis Date    Arthritis     Arthropathy     ONSET: [id number]    Cellulitis     ONSET: 63DOJ9532    Colon polyp     Costochondritis     ONSET: 30TQS7311    Dermatitis     ONSET: 93LNI5369    Diabetes mellitus ([facility] )     borderline    Hemorrhagic detachment of retinal pigment epithelium     ONSET: 65QHD3143    Hx of vertigo     one year ago 2020-treated-no further episodes    Hypertension     LAST ASSESSED: 96TCG5257    Ingrown nail     LAST ASSESSED: 83QCU6604    Labyrinthitis     ONSET: 97FVR1221    Lymphadenopathy     ONSET: 51WTS6806    Myalgia     ONSET: 40YCY7773    Myositis     ONSET: 44KPN8271    Nonallopathic lesion     ONSET: 85YBP2401    Shortness of breath     ONSET: 30SEP2008-with exertion-had cardiac workup with stress test-was "good" per patient  2/23/21  DS    Systemic lupus erythematosus ([facility] )     LAST ASSESSED: 38IUF9722    Tinea corporis     ONSET: 00EEP3142    Vertigo     LAST ASSESSED: 08JBW0311     Past Surgical History:   Procedure Laterality Date    COLONOSCOPY      EGD      FINGER SURGERY      left index     Social History   Social History     Substance and Sexual Activity   Alcohol Use Yes    Comment: rare     Social History     Substance and Sexual Activity   Drug Use No     Social History     Tobacco Use   Smoking Status Never Smoker   Smokeless Tobacco Never Used     Family History   Problem Relation Age of Onset    Stomach cancer Mother         MALIGNANT NEOPLASM    Hypertension Father     Coronary artery disease Sister     Coronary artery disease Brother     Other Brother         CARDIAC PACEMAKER, CARDIOMEGALY    Diabetes Sister        Meds/Allergies       Current Outpatient Medications:     aspirin (ECOTRIN LOW STRENGTH) 81 mg EC tablet    Cholecalciferol (VITAMIN D3) 3000 units TABS    Coenzyme Q10 (COQ10) 200 MG CAPS    ezetimibe (ZETIA) 10 mg tablet    lisinopril (ZESTRIL) 20 mg tablet    metFORMIN (GLUCOPHAGE) 500 mg tablet    amLODIPine (NORVASC) 2 5 mg tablet    hydroxychloroquine (PLAQUENIL) 200 mg tablet    Current Facility-Administered Medications:     sodium chloride 0 9 % infusion, 30 mL/hr, Intravenous, Continuous    Allergies   Allergen Reactions    Other      Reaction Date: [id number]; Annotation - [id number]: rash   madlpn adhesive tape    Penicillins Rash     Reaction Date: [id number]; Annotation - [id number]: jjw       Objective     There were no vitals taken for this visit  PHYSICAL EXAM    Gen: NAD  Head: NCAT  CV: RRR  CHEST: Clear  ABD: soft, NT/ND  EXT: no edema      ASSESSMENT/PLAN:  This is a 61y o  year old female here for EGD colonoscopy, and she is stable and optimized for her procedure

## 2022-02-23 NOTE — ANESTHESIA POSTPROCEDURE EVALUATION
Post-Op Assessment Note    CV Status:  Stable  Pain Score: 0    Pain management: adequate     Mental Status:  Alert and awake   Hydration Status:  Stable   PONV Controlled:  None   Airway Patency:  Patent      Post Op Vitals Reviewed: Yes      Staff: CRNA         No complications documented      /70 (02/23/22 0930)    Temp     Pulse 71 (02/23/22 0930)   Resp 18 (02/23/22 0930)    SpO2 96 % (02/23/22 0930)

## 2022-03-03 ENCOUNTER — OFFICE VISIT (OUTPATIENT)
Dept: FAMILY MEDICINE CLINIC | Facility: CLINIC | Age: 67
End: 2022-03-03
Payer: COMMERCIAL

## 2022-03-03 VITALS
BODY MASS INDEX: 38.33 KG/M2 | HEIGHT: 61 IN | DIASTOLIC BLOOD PRESSURE: 80 MMHG | RESPIRATION RATE: 16 BRPM | TEMPERATURE: 98 F | SYSTOLIC BLOOD PRESSURE: 160 MMHG | HEART RATE: 66 BPM | WEIGHT: 203 LBS

## 2022-03-03 DIAGNOSIS — R06.02 SOB (SHORTNESS OF BREATH): ICD-10-CM

## 2022-03-03 DIAGNOSIS — E78.5 HYPERLIPIDEMIA, UNSPECIFIED HYPERLIPIDEMIA TYPE: ICD-10-CM

## 2022-03-03 DIAGNOSIS — I10 BENIGN ESSENTIAL HYPERTENSION: ICD-10-CM

## 2022-03-03 DIAGNOSIS — E11.9 TYPE 2 DIABETES MELLITUS WITHOUT COMPLICATION, WITHOUT LONG-TERM CURRENT USE OF INSULIN (HCC): Primary | ICD-10-CM

## 2022-03-03 LAB — SL AMB POCT HEMOGLOBIN AIC: 5.9 (ref ?–6.5)

## 2022-03-03 PROCEDURE — 99214 OFFICE O/P EST MOD 30 MIN: CPT | Performed by: FAMILY MEDICINE

## 2022-03-03 PROCEDURE — 3079F DIAST BP 80-89 MM HG: CPT | Performed by: FAMILY MEDICINE

## 2022-03-03 PROCEDURE — 4010F ACE/ARB THERAPY RXD/TAKEN: CPT | Performed by: FAMILY MEDICINE

## 2022-03-03 PROCEDURE — 3077F SYST BP >= 140 MM HG: CPT | Performed by: FAMILY MEDICINE

## 2022-03-03 PROCEDURE — 3008F BODY MASS INDEX DOCD: CPT | Performed by: FAMILY MEDICINE

## 2022-03-03 PROCEDURE — 83036 HEMOGLOBIN GLYCOSYLATED A1C: CPT | Performed by: FAMILY MEDICINE

## 2022-03-03 PROCEDURE — 1036F TOBACCO NON-USER: CPT | Performed by: FAMILY MEDICINE

## 2022-03-03 RX ORDER — LISINOPRIL 20 MG/1
20 TABLET ORAL DAILY
Qty: 90 TABLET | Refills: 2 | Status: SHIPPED | OUTPATIENT
Start: 2022-03-03 | End: 2022-04-07 | Stop reason: SDUPTHER

## 2022-03-03 RX ORDER — AMLODIPINE BESYLATE 2.5 MG/1
2.5 TABLET ORAL DAILY
Qty: 90 TABLET | Refills: 1 | Status: SHIPPED | OUTPATIENT
Start: 2022-03-03

## 2022-03-03 RX ORDER — EZETIMIBE 10 MG/1
10 TABLET ORAL DAILY
Qty: 90 TABLET | Refills: 2 | Status: SHIPPED | OUTPATIENT
Start: 2022-03-03 | End: 2022-04-07 | Stop reason: SDUPTHER
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
## 2022-03-04 ENCOUNTER — TELEPHONE (OUTPATIENT)
Dept: FAMILY MEDICINE CLINIC | Facility: CLINIC | Age: 67
End: 2022-03-04

## 2022-03-27 LAB
BUN SERPL-MCNC: 13 MG/DL (ref 8–27)
BUN/CREAT SERPL: 19 (ref 12–28)
CALCIUM SERPL-MCNC: 9 MG/DL (ref 8.7–10.3)
CHLORIDE SERPL-SCNC: 104 MMOL/L (ref 96–106)
CO2 SERPL-SCNC: 21 MMOL/L (ref 20–29)
CREAT SERPL-MCNC: 0.7 MG/DL (ref 0.57–1)
EGFR: 97 ML/MIN/1.73
GLUCOSE SERPL-MCNC: 113 MG/DL (ref 65–99)
HBA1C MFR BLD: 6 % (ref 4.8–5.6)
POTASSIUM SERPL-SCNC: 4.7 MMOL/L (ref 3.5–5.2)
SODIUM SERPL-SCNC: 142 MMOL/L (ref 134–144)

## 2022-03-27 PROCEDURE — 3044F HG A1C LEVEL LT 7.0%: CPT | Performed by: FAMILY MEDICINE

## 2022-04-07 DIAGNOSIS — I10 BENIGN ESSENTIAL HYPERTENSION: ICD-10-CM

## 2022-04-07 DIAGNOSIS — E78.5 HYPERLIPIDEMIA, UNSPECIFIED HYPERLIPIDEMIA TYPE: ICD-10-CM

## 2022-04-07 RX ORDER — EZETIMIBE 10 MG/1
10 TABLET ORAL DAILY
Qty: 90 TABLET | Refills: 2 | Status: SHIPPED | OUTPATIENT
Start: 2022-04-07

## 2022-04-07 RX ORDER — LISINOPRIL 20 MG/1
20 TABLET ORAL DAILY
Qty: 90 TABLET | Refills: 2 | Status: SHIPPED | OUTPATIENT
Start: 2022-04-07

## 2022-04-07 NOTE — TELEPHONE ENCOUNTER
Called patient, she says when she went to [facility] last month, they told her they only received rxs for amlodipine and metformin  Looks like there was transmission error with zetia  Please call [facility] with verbal orders for zetia and lisinopril as escribed on 3/3

## 2022-09-03 ENCOUNTER — APPOINTMENT (OUTPATIENT)
Dept: RADIOLOGY | Facility: HOSPITAL | Age: 67
End: 2022-09-03
Payer: OTHER MISCELLANEOUS

## 2022-09-03 ENCOUNTER — APPOINTMENT (EMERGENCY)
Dept: RADIOLOGY | Facility: HOSPITAL | Age: 67
End: 2022-09-03
Payer: OTHER MISCELLANEOUS

## 2022-09-03 ENCOUNTER — HOSPITAL ENCOUNTER (OUTPATIENT)
Facility: HOSPITAL | Age: 67
Setting detail: OBSERVATION
Discharge: HOME | End: 2022-09-04
Attending: EMERGENCY MEDICINE | Admitting: FAMILY MEDICINE
Payer: OTHER MISCELLANEOUS

## 2022-09-03 DIAGNOSIS — E78.5 HYPERLIPIDEMIA, UNSPECIFIED HYPERLIPIDEMIA TYPE: ICD-10-CM

## 2022-09-03 DIAGNOSIS — S32.010A COMPRESSION FRACTURE OF L1 LUMBAR VERTEBRA (HCC): ICD-10-CM

## 2022-09-03 DIAGNOSIS — R77.8 ELEVATED TROPONIN: ICD-10-CM

## 2022-09-03 DIAGNOSIS — I16.0 HYPERTENSIVE URGENCY: ICD-10-CM

## 2022-09-03 DIAGNOSIS — I10 BENIGN ESSENTIAL HYPERTENSION: ICD-10-CM

## 2022-09-03 DIAGNOSIS — M48.50XA VERTEBRAL COMPRESSION FRACTURE (HCC): ICD-10-CM

## 2022-09-03 DIAGNOSIS — M50.30 DEGENERATIVE DISC DISEASE, CERVICAL: ICD-10-CM
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
## 2022-09-03 PROBLEM — R79.89 ELEVATED TROPONIN: Status: ACTIVE | Noted: 2022-09-03

## 2022-09-03 PROBLEM — E11.9 TYPE 2 DIABETES MELLITUS (HCC): Status: ACTIVE | Noted: 2022-09-03

## 2022-09-03 PROBLEM — E66.9 OBESITY: Status: ACTIVE | Noted: 2022-09-03

## 2022-09-03 PROBLEM — R06.09 DYSPNEA ON EXERTION: Status: ACTIVE | Noted: 2022-09-03

## 2022-09-03 PROBLEM — R06.00 DYSPNEA ON EXERTION: Status: ACTIVE | Noted: 2022-09-03

## 2022-09-03 LAB
2HR DELTA HS TROPONIN: 37 NG/L
4HR DELTA HS TROPONIN: 74 NG/L
ALBUMIN SERPL BCP-MCNC: 3.6 G/DL (ref 3.5–5)
ALP SERPL-CCNC: 68 U/L (ref 46–116)
ALT SERPL W P-5'-P-CCNC: 20 U/L (ref 12–78)
ANION GAP SERPL CALCULATED.3IONS-SCNC: 8 MMOL/L (ref 4–13)
APTT PPP: 25 SECONDS (ref 23–37)
AST SERPL W P-5'-P-CCNC: 12 U/L (ref 5–45)
BACTERIA UR QL AUTO: NORMAL /HPF
BASOPHILS # BLD AUTO: 0.04 THOUSANDS/ΜL (ref 0–0.1)
BASOPHILS NFR BLD AUTO: 1 % (ref 0–1)
BILIRUB SERPL-MCNC: 0.52 MG/DL (ref 0.2–1)
BILIRUB UR QL STRIP: NEGATIVE
BUN SERPL-MCNC: 12 MG/DL (ref 5–25)
CALCIUM SERPL-MCNC: 8.4 MG/DL (ref 8.3–10.1)
CARDIAC TROPONIN I PNL SERPL HS: 42 NG/L
CARDIAC TROPONIN I PNL SERPL HS: 5 NG/L
CARDIAC TROPONIN I PNL SERPL HS: 79 NG/L
CARDIAC TROPONIN I PNL SERPL HS: 94 NG/L (ref 8–18)
CHLORIDE SERPL-SCNC: 107 MMOL/L (ref 96–108)
CLARITY UR: CLEAR
CO2 SERPL-SCNC: 29 MMOL/L (ref 21–32)
COLOR UR: ABNORMAL
CREAT SERPL-MCNC: 0.79 MG/DL (ref 0.6–1.3)
EOSINOPHIL # BLD AUTO: 0.23 THOUSAND/ΜL (ref 0–0.61)
EOSINOPHIL NFR BLD AUTO: 3 % (ref 0–6)
ERYTHROCYTE [DISTWIDTH] IN BLOOD BY AUTOMATED COUNT: 12.1 % (ref 11.6–15.1)
GFR SERPL CREATININE-BSD FRML MDRD: 79 ML/MIN/1.73SQ M
GLUCOSE SERPL-MCNC: 101 MG/DL (ref 65–140)
GLUCOSE SERPL-MCNC: 104 MG/DL (ref 65–140)
GLUCOSE SERPL-MCNC: 161 MG/DL (ref 65–140)
GLUCOSE SERPL-MCNC: 91 MG/DL (ref 65–140)
GLUCOSE UR STRIP-MCNC: NEGATIVE MG/DL
HCT VFR BLD AUTO: 43.1 % (ref 34.8–46.1)
HGB BLD-MCNC: 13.6 G/DL (ref 11.5–15.4)
HGB UR QL STRIP.AUTO: NEGATIVE
IMM GRANULOCYTES # BLD AUTO: 0.08 THOUSAND/UL (ref 0–0.2)
IMM GRANULOCYTES NFR BLD AUTO: 1 % (ref 0–2)
INR PPP: 0.91 (ref 0.84–1.19)
KETONES UR STRIP-MCNC: NEGATIVE MG/DL
LEUKOCYTE ESTERASE UR QL STRIP: NEGATIVE
LYMPHOCYTES # BLD AUTO: 2.14 THOUSANDS/ΜL (ref 0.6–4.47)
LYMPHOCYTES NFR BLD AUTO: 28 % (ref 14–44)
MAGNESIUM SERPL-MCNC: 2.1 MG/DL (ref 1.6–2.6)
MCH RBC QN AUTO: 27.6 PG (ref 26.8–34.3)
MCHC RBC AUTO-ENTMCNC: 31.6 G/DL (ref 31.4–37.4)
MCV RBC AUTO: 87 FL (ref 82–98)
MONOCYTES # BLD AUTO: 0.45 THOUSAND/ΜL (ref 0.17–1.22)
MONOCYTES NFR BLD AUTO: 6 % (ref 4–12)
NEUTROPHILS # BLD AUTO: 4.79 THOUSANDS/ΜL (ref 1.85–7.62)
NEUTS SEG NFR BLD AUTO: 61 % (ref 43–75)
NITRITE UR QL STRIP: NEGATIVE
NON-SQ EPI CELLS URNS QL MICRO: NORMAL /HPF
NRBC BLD AUTO-RTO: 0 /100 WBCS
PH UR STRIP.AUTO: 8 [PH]
PLATELET # BLD AUTO: 374 THOUSANDS/UL (ref 149–390)
PMV BLD AUTO: 9.3 FL (ref 8.9–12.7)
POTASSIUM SERPL-SCNC: 4 MMOL/L (ref 3.5–5.3)
PROT SERPL-MCNC: 7.3 G/DL (ref 6.4–8.4)
PROT UR STRIP-MCNC: ABNORMAL MG/DL
PROTHROMBIN TIME: 12.4 SECONDS (ref 11.6–14.5)
RBC # BLD AUTO: 4.93 MILLION/UL (ref 3.81–5.12)
RBC #/AREA URNS AUTO: NORMAL /HPF
SODIUM SERPL-SCNC: 144 MMOL/L (ref 135–147)
SP GR UR STRIP.AUTO: 1.01 (ref 1–1.03)
UROBILINOGEN UR QL STRIP.AUTO: 0.2 E.U./DL
WBC # BLD AUTO: 7.73 THOUSAND/UL (ref 4.31–10.16)
WBC #/AREA URNS AUTO: NORMAL /HPF

## 2022-09-03 PROCEDURE — 83036 HEMOGLOBIN GLYCOSYLATED A1C: CPT | Performed by: NURSE PRACTITIONER

## 2022-09-03 PROCEDURE — 70450 CT HEAD/BRAIN W/O DYE: CPT

## 2022-09-03 PROCEDURE — 83735 ASSAY OF MAGNESIUM: CPT | Performed by: EMERGENCY MEDICINE

## 2022-09-03 PROCEDURE — G1004 CDSM NDSC: HCPCS

## 2022-09-03 PROCEDURE — 97530 THERAPEUTIC ACTIVITIES: CPT

## 2022-09-03 PROCEDURE — 99220 PR INITIAL OBSERVATION CARE/DAY 70 MINUTES: CPT | Performed by: INTERNAL MEDICINE

## 2022-09-03 PROCEDURE — 84484 ASSAY OF TROPONIN QUANT: CPT | Performed by: NURSE PRACTITIONER

## 2022-09-03 PROCEDURE — 85730 THROMBOPLASTIN TIME PARTIAL: CPT | Performed by: EMERGENCY MEDICINE

## 2022-09-03 PROCEDURE — 72125 CT NECK SPINE W/O DYE: CPT

## 2022-09-03 PROCEDURE — 99285 EMERGENCY DEPT VISIT HI MDM: CPT | Performed by: EMERGENCY MEDICINE

## 2022-09-03 PROCEDURE — 96375 TX/PRO/DX INJ NEW DRUG ADDON: CPT

## 2022-09-03 PROCEDURE — 84484 ASSAY OF TROPONIN QUANT: CPT | Performed by: EMERGENCY MEDICINE

## 2022-09-03 PROCEDURE — 85610 PROTHROMBIN TIME: CPT | Performed by: EMERGENCY MEDICINE

## 2022-09-03 PROCEDURE — 74177 CT ABD & PELVIS W/CONTRAST: CPT

## 2022-09-03 PROCEDURE — 93005 ELECTROCARDIOGRAM TRACING: CPT

## 2022-09-03 PROCEDURE — 97162 PT EVAL MOD COMPLEX 30 MIN: CPT

## 2022-09-03 PROCEDURE — 85025 COMPLETE CBC W/AUTO DIFF WBC: CPT | Performed by: EMERGENCY MEDICINE

## 2022-09-03 PROCEDURE — 82948 REAGENT STRIP/BLOOD GLUCOSE: CPT

## 2022-09-03 PROCEDURE — 72100 X-RAY EXAM L-S SPINE 2/3 VWS: CPT

## 2022-09-03 PROCEDURE — 99223 1ST HOSP IP/OBS HIGH 75: CPT | Performed by: INTERNAL MEDICINE

## 2022-09-03 PROCEDURE — 96374 THER/PROPH/DIAG INJ IV PUSH: CPT

## 2022-09-03 PROCEDURE — 99285 EMERGENCY DEPT VISIT HI MDM: CPT

## 2022-09-03 PROCEDURE — 96361 HYDRATE IV INFUSION ADD-ON: CPT

## 2022-09-03 PROCEDURE — 80053 COMPREHEN METABOLIC PANEL: CPT | Performed by: EMERGENCY MEDICINE

## 2022-09-03 PROCEDURE — 70486 CT MAXILLOFACIAL W/O DYE: CPT

## 2022-09-03 PROCEDURE — 71260 CT THORAX DX C+: CPT

## 2022-09-03 PROCEDURE — 81001 URINALYSIS AUTO W/SCOPE: CPT | Performed by: EMERGENCY MEDICINE

## 2022-09-03 RX ORDER — MORPHINE SULFATE 4 MG/ML
4 INJECTION, SOLUTION INTRAMUSCULAR; INTRAVENOUS ONCE
Status: COMPLETED | OUTPATIENT
Start: 2022-09-03 | End: 2022-09-03

## 2022-09-03 RX ORDER — INSULIN LISPRO 100 [IU]/ML
1-5 INJECTION, SOLUTION INTRAVENOUS; SUBCUTANEOUS
Status: DISCONTINUED | OUTPATIENT
Start: 2022-09-03 | End: 2022-09-04 | Stop reason: HOSPADM

## 2022-09-03 RX ORDER — EZETIMIBE 10 MG/1
10 TABLET ORAL DAILY
Status: DISCONTINUED | OUTPATIENT
Start: 2022-09-03 | End: 2022-09-04 | Stop reason: HOSPADM

## 2022-09-03 RX ORDER — LISINOPRIL 20 MG/1
20 TABLET ORAL DAILY
Status: DISCONTINUED | OUTPATIENT
Start: 2022-09-04 | End: 2022-09-03

## 2022-09-03 RX ORDER — ONDANSETRON 2 MG/ML
4 INJECTION INTRAMUSCULAR; INTRAVENOUS EVERY 6 HOURS PRN
Status: DISCONTINUED | OUTPATIENT
Start: 2022-09-03 | End: 2022-09-04 | Stop reason: HOSPADM

## 2022-09-03 RX ORDER — ASPIRIN 81 MG/1
81 TABLET ORAL DAILY
Status: DISCONTINUED | OUTPATIENT
Start: 2022-09-03 | End: 2022-09-04 | Stop reason: HOSPADM

## 2022-09-03 RX ORDER — ACETAMINOPHEN 325 MG/1
650 TABLET ORAL EVERY 6 HOURS PRN
Status: DISCONTINUED | OUTPATIENT
Start: 2022-09-03 | End: 2022-09-04 | Stop reason: HOSPADM

## 2022-09-03 RX ORDER — HYDRALAZINE HYDROCHLORIDE 20 MG/ML
5 INJECTION INTRAMUSCULAR; INTRAVENOUS EVERY 8 HOURS PRN
Status: DISCONTINUED | OUTPATIENT
Start: 2022-09-03 | End: 2022-09-04 | Stop reason: HOSPADM

## 2022-09-03 RX ORDER — KETOROLAC TROMETHAMINE 30 MG/ML
15 INJECTION, SOLUTION INTRAMUSCULAR; INTRAVENOUS EVERY 6 HOURS PRN
Status: DISCONTINUED | OUTPATIENT
Start: 2022-09-03 | End: 2022-09-04 | Stop reason: HOSPADM

## 2022-09-03 RX ORDER — ENOXAPARIN SODIUM 100 MG/ML
40 INJECTION SUBCUTANEOUS DAILY
Status: DISCONTINUED | OUTPATIENT
Start: 2022-09-03 | End: 2022-09-04 | Stop reason: HOSPADM

## 2022-09-03 RX ORDER — MAGNESIUM HYDROXIDE/ALUMINUM HYDROXICE/SIMETHICONE 120; 1200; 1200 MG/30ML; MG/30ML; MG/30ML
30 SUSPENSION ORAL EVERY 6 HOURS PRN
Status: DISCONTINUED | OUTPATIENT
Start: 2022-09-03 | End: 2022-09-04 | Stop reason: HOSPADM

## 2022-09-03 RX ORDER — LISINOPRIL 20 MG/1
40 TABLET ORAL DAILY
Status: DISCONTINUED | OUTPATIENT
Start: 2022-09-03 | End: 2022-09-04 | Stop reason: HOSPADM

## 2022-09-03 RX ORDER — MELATONIN
1000 DAILY
Status: DISCONTINUED | OUTPATIENT
Start: 2022-09-04 | End: 2022-09-04 | Stop reason: HOSPADM

## 2022-09-03 RX ORDER — POLYETHYLENE GLYCOL 3350 17 G/17G
17 POWDER, FOR SOLUTION ORAL DAILY PRN
Status: DISCONTINUED | OUTPATIENT
Start: 2022-09-03 | End: 2022-09-04 | Stop reason: HOSPADM

## 2022-09-03 RX ORDER — AMLODIPINE BESYLATE 2.5 MG/1
2.5 TABLET ORAL DAILY
Status: DISCONTINUED | OUTPATIENT
Start: 2022-09-03 | End: 2022-09-03

## 2022-09-03 RX ORDER — LIDOCAINE 50 MG/G
1 PATCH TOPICAL ONCE
Status: COMPLETED | OUTPATIENT
Start: 2022-09-03 | End: 2022-09-04

## 2022-09-03 RX ORDER — KETOROLAC TROMETHAMINE 30 MG/ML
15 INJECTION, SOLUTION INTRAMUSCULAR; INTRAVENOUS ONCE
Status: COMPLETED | OUTPATIENT
Start: 2022-09-03 | End: 2022-09-03

## 2022-09-03 RX ORDER — AMLODIPINE BESYLATE 2.5 MG/1
2.5 TABLET ORAL DAILY
Status: DISCONTINUED | OUTPATIENT
Start: 2022-09-04 | End: 2022-09-03

## 2022-09-03 RX ORDER — ASPIRIN 81 MG/1
324 TABLET, CHEWABLE ORAL ONCE
Status: COMPLETED | OUTPATIENT
Start: 2022-09-03 | End: 2022-09-03

## 2022-09-03 RX ORDER — LISINOPRIL 20 MG/1
20 TABLET ORAL DAILY
Status: DISCONTINUED | OUTPATIENT
Start: 2022-09-03 | End: 2022-09-03

## 2022-09-03 RX ADMIN — LISINOPRIL 40 MG: 20 TABLET ORAL at 16:05

## 2022-09-03 RX ADMIN — ASPIRIN 81 MG CHEWABLE TABLET 324 MG: 81 TABLET CHEWABLE at 13:46

## 2022-09-03 RX ADMIN — SODIUM CHLORIDE 1000 ML: 0.9 INJECTION, SOLUTION INTRAVENOUS at 08:12

## 2022-09-03 RX ADMIN — LIDOCAINE 5% 1 PATCH: 700 PATCH TOPICAL at 13:41

## 2022-09-03 RX ADMIN — KETOROLAC TROMETHAMINE 15 MG: 30 INJECTION, SOLUTION INTRAMUSCULAR at 10:48

## 2022-09-03 RX ADMIN — EZETIMIBE 10 MG: 10 TABLET ORAL at 16:05

## 2022-09-03 RX ADMIN — ASPIRIN 81 MG: 81 TABLET, COATED ORAL at 16:05

## 2022-09-03 RX ADMIN — KETOROLAC TROMETHAMINE 15 MG: 30 INJECTION, SOLUTION INTRAMUSCULAR at 21:48

## 2022-09-03 RX ADMIN — IOHEXOL 65 ML: 350 INJECTION, SOLUTION INTRAVENOUS at 09:03

## 2022-09-03 RX ADMIN — MORPHINE SULFATE 4 MG: 4 INJECTION INTRAVENOUS at 08:11

## 2022-09-03 RX ADMIN — POLYETHYLENE GLYCOL 3350 17 G: 17 POWDER, FOR SOLUTION ORAL at 16:05

## 2022-09-03 RX ADMIN — ENOXAPARIN SODIUM 40 MG: 40 INJECTION SUBCUTANEOUS at 15:00
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
## 2022-09-03 NOTE — PLAN OF CARE
Problem: PHYSICAL THERAPY ADULT  Goal: Performs mobility at highest level of function for planned discharge setting  See evaluation for individualized goals  Description: Treatment/Interventions:  (N/A  - D/C PT)       See flowsheet documentation for full assessment, interventions and recommendations  Outcome: Completed  Note: Prognosis: Good  Problem List: Pain  Assessment: Patient seen for Physical Therapy evaluation  Patient admitted with <principal problem not specified>  Comorbidities affecting patient's physical performance include: hypertension and vertigo  Personal factors affecting patient at time of initial evaluation include: lives in 2 story house  Prior to admission, patient was independent with functional mobility without assistive device, independent with ADLS, independent with IADLS and works full time  Please find objective findings from Physical Therapy assessment regarding body systems outlined above with impairments and limitations including decreased endurance, pain and decreased activity tolerance  The Barthel Index was used as a functional outcome tool presenting with a score of Barthel Index Score: 75 today indicating moderate limitations of functional mobility and ADLS  Patient's clinical presentation is currently evolving as seen in patient's presentation of changing level of pain, increased fall risk and decreased endurance  Pt would benefit from continued Physical Therapy treatment to address deficits as defined above and maximize level of functional mobility  As demonstrated by objective findings, the assigned level of complexity for this evaluation is moderate  The patient's AM-[facility] Basic Mobility Inpatient Short Form Raw Score is 23  A Raw score of greater than 16 suggests the patient may benefit from discharge to home  Please also refer to the recommendation of the Physical Therapist for safe discharge planning          PT Discharge Recommendation: No rehabilitation needs (Follow-up with MD and progress to OP PT as appropriate)    See flowsheet documentation for full assessment

## 2022-09-03 NOTE — LETTER
[address] [facility] [address]  [place] [place]  [place] [address]  Dept: [phone]    September 4, 2022     Patient: Lida Navas   YOB: 1955   Date of Visit: 9/3/2022       To Whom it May Concern: Salena Washington is under my professional care  She was seen in the hospital from 9/3/2022   to 09/04/22  She may return to work on 9/13/22 with the following limitations no driving until cleared by neurosurgery   If you have any questions or concerns, please don't hesitate to call           Sincerely,          Vince Gonzalez

## 2022-09-03 NOTE — DISCHARGE INSTRUCTIONS
Neurosurgery discharge instructions following spine fracture:     TLSO brace to be worn when out of bed of head of bed greater than 45 degrees  May place brace on while sitting on edge of bed  May be removed for showering  No bending, twisting or heavy lifting  No strenuous activities  No Driving  Follow-up as scheduled in two weeks with repeat spine x-rays to be completed 2-3 days prior to visit  Prescription has been entered electronically  **Please notify MD immediately if you have increased back or leg pain   New numbness, tingling, weakness in your legs and/or bowel/bladder incontinence**
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
## 2022-09-03 NOTE — ASSESSMENT & PLAN NOTE
Troponin 5 -> 42 -> 79 with a Delta of 74  EKG shows NSR, HR 71, T wave inversion in lead 1   No chest pain  · Spoke with on-call cardiologist regarding consult  · Cardiology recommends daily ASA  No need for Lovenox     · Monitor on telemetry  · Continue to trend troponin level  · Repeat EKG tonight and tomorrow morning   · Will need an echocardiogram
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
## 2022-09-03 NOTE — ASSESSMENT & PLAN NOTE
Possibly from deconditioning  · Check lipid panel  · Will need an echocardiogram  · Consider outpatient stress test pending cardiology evaluation

## 2022-09-03 NOTE — ASSESSMENT & PLAN NOTE
Lab Results   Component Value Date    HGBA1C 6 0 (H) 03/26/2022       Recent Labs     09/03/22  1443   POCGLU 101       Blood Sugar Average: Last 72 hrs:  (P) 101   · Hold metformin  · Repeat A1c  · Monitor Accu-Cheks AC + H with Lispro insulin sliding scale coverage

## 2022-09-03 NOTE — ASSESSMENT & PLAN NOTE
Blood pressure on admission 221/100 likely elevated due to pain, stress response, and skipping BP meds this morning   · Stop Norvasc 2/2 lower extremity swelling  · Increase Lisinopril from 20 mg to 40 mg daily   · Pain control   · Monitor
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
## 2022-09-03 NOTE — PLAN OF CARE
Problem: METABOLIC, FLUID AND ELECTROLYTES - ADULT  Goal: Electrolytes maintained within normal limits  Description: INTERVENTIONS:  - Monitor labs and assess patient for signs and symptoms of electrolyte imbalances  - Administer electrolyte replacement as ordered  - Monitor response to electrolyte replacements, including repeat lab results as appropriate  - Instruct patient on fluid and nutrition as appropriate  Outcome: Progressing  Goal: Fluid balance maintained  Description: INTERVENTIONS:  - Monitor labs   - Monitor I/O and WT  - Instruct patient on fluid and nutrition as appropriate  - Assess for signs & symptoms of volume excess or deficit  Outcome: Progressing  Goal: Glucose maintained within target range  Description: INTERVENTIONS:  - Monitor Blood Glucose as ordered  - Assess for signs and symptoms of hyperglycemia and hypoglycemia  - Administer ordered medications to maintain glucose within target range  - Assess nutritional intake and initiate nutrition service referral as needed  Outcome: Progressing     Problem: SKIN/TISSUE INTEGRITY - ADULT  Goal: Skin Integrity remains intact(Skin Breakdown Prevention)  Description: Assess:    -Inspect skin when repositioning, toileting, and assisting with ADLS  -Assess extremities for adequate circulation and sensation     Bed Management:  -Have minimal linens on bed & keep smooth, unwrinkled  -Change linens as needed when moist or perspiring  -Avoid sitting or lying in one position for more than 2 hours while in bed  -Keep HOB at 45 degrees     Toileting:  -Offer bedside commode  -Assess for incontinence every 2  -Use incontinent care products after each incontinent episode such as     Activity:  -Mobilize patient 2 times a day  -Encourage activity and walks on unit  -Encourage or provide ROM exercises   -Turn and reposition patient every 2 Hours  -Use appropriate equipment to lift or move patient in bed  -Instruct/ Assist with weight shifting every 2 when out of bed in chair  -Consider limitation of chair time 2 hour intervals    Skin Care:  -Avoid use of baby powder, tape, friction and shearing, hot water or constrictive clothing  -Relieve pressure over bony prominences using Alleyvn  -Do not massage red bony areas    Next Steps:  -Teach patient strategies to minimize risks such as bed sores   -Consider consults to  interdisciplinary teams such as wound care  Outcome: Progressing  Goal: Incision(s), wounds(s) or drain site(s) healing without S/S of infection  Description: INTERVENTIONS  - Assess and document dressing, incision, wound bed, drain sites and surrounding tissue  - Provide patient and family education  Outcome: Progressing  Goal: Pressure injury heals and does not worsen  Description: Interventions:  - Implement low air loss mattress or specialty surface (Criteria met)  - Apply silicone foam dressing  - Apply fecal or urinary incontinence containment device   - Utilize friction reducing device or surface for transfers   - Consider nutrition services referral as needed  Outcome: Progressing

## 2022-09-03 NOTE — ASSESSMENT & PLAN NOTE
BP elevated likely due to stress response and missing morning dose of BP medications  · Due to leg swelling, will discontinue Norvasc and increase Lisinopril from 20 mg to 40 mg daily  · Monitor BP; will need PCP follow-up

## 2022-09-03 NOTE — TREATMENT PLAN
Reviewed upright x-ray which demonstrates grossly stable L1 fracture as well as grossly stable spinal alignment  Continue to wear TLSO brace when out of bed or head of bed greater than 45°  Mobilize with PT/OT with brace  Medical management and pain control per primary team     Neurosurgery will sign off, patient will follow-up in 2 weeks with repeat lumbar spine x-rays, call with any further questions or concerns

## 2022-09-04 VITALS
WEIGHT: 204 LBS | OXYGEN SATURATION: 97 % | SYSTOLIC BLOOD PRESSURE: 167 MMHG | HEART RATE: 78 BPM | RESPIRATION RATE: 17 BRPM | DIASTOLIC BLOOD PRESSURE: 75 MMHG | TEMPERATURE: 98.4 F | BODY MASS INDEX: 34.83 KG/M2 | HEIGHT: 64 IN

## 2022-09-04 PROBLEM — I16.0 HYPERTENSIVE URGENCY: Status: RESOLVED | Noted: 2022-09-03 | Resolved: 2022-09-04

## 2022-09-04 LAB
ALBUMIN SERPL BCP-MCNC: 3.3 G/DL (ref 3.5–5)
ALP SERPL-CCNC: 62 U/L (ref 46–116)
ALT SERPL W P-5'-P-CCNC: 12 U/L (ref 12–78)
ANION GAP SERPL CALCULATED.3IONS-SCNC: 7 MMOL/L (ref 4–13)
AST SERPL W P-5'-P-CCNC: 11 U/L (ref 5–45)
ATRIAL RATE: 74 BPM
BASOPHILS # BLD AUTO: 0.04 THOUSANDS/ΜL (ref 0–0.1)
BASOPHILS NFR BLD AUTO: 1 % (ref 0–1)
BILIRUB SERPL-MCNC: 0.76 MG/DL (ref 0.2–1)
BUN SERPL-MCNC: 11 MG/DL (ref 5–25)
CALCIUM ALBUM COR SERPL-MCNC: 8.9 MG/DL (ref 8.3–10.1)
CALCIUM SERPL-MCNC: 8.3 MG/DL (ref 8.3–10.1)
CARDIAC TROPONIN I PNL SERPL HS: 30 NG/L (ref 8–18)
CHLORIDE SERPL-SCNC: 108 MMOL/L (ref 96–108)
CHOLEST SERPL-MCNC: 204 MG/DL
CO2 SERPL-SCNC: 28 MMOL/L (ref 21–32)
CREAT SERPL-MCNC: 0.69 MG/DL (ref 0.6–1.3)
EOSINOPHIL # BLD AUTO: 0.25 THOUSAND/ΜL (ref 0–0.61)
EOSINOPHIL NFR BLD AUTO: 4 % (ref 0–6)
ERYTHROCYTE [DISTWIDTH] IN BLOOD BY AUTOMATED COUNT: 12.4 % (ref 11.6–15.1)
EST. AVERAGE GLUCOSE BLD GHB EST-MCNC: 123 MG/DL
GFR SERPL CREATININE-BSD FRML MDRD: 92 ML/MIN/1.73SQ M
GLUCOSE SERPL-MCNC: 105 MG/DL (ref 65–140)
GLUCOSE SERPL-MCNC: 109 MG/DL (ref 65–140)
GLUCOSE SERPL-MCNC: 112 MG/DL (ref 65–140)
HBA1C MFR BLD: 5.9 %
HCT VFR BLD AUTO: 39.1 % (ref 34.8–46.1)
HDLC SERPL-MCNC: 32 MG/DL
HGB BLD-MCNC: 12.3 G/DL (ref 11.5–15.4)
IMM GRANULOCYTES # BLD AUTO: 0.02 THOUSAND/UL (ref 0–0.2)
IMM GRANULOCYTES NFR BLD AUTO: 0 % (ref 0–2)
LDLC SERPL CALC-MCNC: 135 MG/DL (ref 0–100)
LYMPHOCYTES # BLD AUTO: 2.12 THOUSANDS/ΜL (ref 0.6–4.47)
LYMPHOCYTES NFR BLD AUTO: 33 % (ref 14–44)
MAGNESIUM SERPL-MCNC: 2.3 MG/DL (ref 1.6–2.6)
MCH RBC QN AUTO: 27.8 PG (ref 26.8–34.3)
MCHC RBC AUTO-ENTMCNC: 31.5 G/DL (ref 31.4–37.4)
MCV RBC AUTO: 89 FL (ref 82–98)
MONOCYTES # BLD AUTO: 0.6 THOUSAND/ΜL (ref 0.17–1.22)
MONOCYTES NFR BLD AUTO: 9 % (ref 4–12)
NEUTROPHILS # BLD AUTO: 3.34 THOUSANDS/ΜL (ref 1.85–7.62)
NEUTS SEG NFR BLD AUTO: 53 % (ref 43–75)
NRBC BLD AUTO-RTO: 0 /100 WBCS
P AXIS: 39 DEGREES
PHOSPHATE SERPL-MCNC: 3.1 MG/DL (ref 2.3–4.1)
PLATELET # BLD AUTO: 325 THOUSANDS/UL (ref 149–390)
PMV BLD AUTO: 9.5 FL (ref 8.9–12.7)
POTASSIUM SERPL-SCNC: 3.9 MMOL/L (ref 3.5–5.3)
PR INTERVAL: 154 MS
PROT SERPL-MCNC: 6.9 G/DL (ref 6.4–8.4)
QRS AXIS: 26 DEGREES
QRSD INTERVAL: 84 MS
QT INTERVAL: 396 MS
QTC INTERVAL: 439 MS
RBC # BLD AUTO: 4.42 MILLION/UL (ref 3.81–5.12)
SODIUM SERPL-SCNC: 143 MMOL/L (ref 135–147)
T WAVE AXIS: 36 DEGREES
TRIGL SERPL-MCNC: 184 MG/DL
VENTRICULAR RATE: 74 BPM
WBC # BLD AUTO: 6.37 THOUSAND/UL (ref 4.31–10.16)

## 2022-09-04 PROCEDURE — 82948 REAGENT STRIP/BLOOD GLUCOSE: CPT

## 2022-09-04 PROCEDURE — 84100 ASSAY OF PHOSPHORUS: CPT | Performed by: NURSE PRACTITIONER

## 2022-09-04 PROCEDURE — 93005 ELECTROCARDIOGRAM TRACING: CPT

## 2022-09-04 PROCEDURE — 80061 LIPID PANEL: CPT | Performed by: NURSE PRACTITIONER

## 2022-09-04 PROCEDURE — 85025 COMPLETE CBC W/AUTO DIFF WBC: CPT | Performed by: NURSE PRACTITIONER

## 2022-09-04 PROCEDURE — 84484 ASSAY OF TROPONIN QUANT: CPT | Performed by: NURSE PRACTITIONER

## 2022-09-04 PROCEDURE — 99217 PR OBSERVATION CARE DISCHARGE MANAGEMENT: CPT | Performed by: INTERNAL MEDICINE

## 2022-09-04 PROCEDURE — 83735 ASSAY OF MAGNESIUM: CPT | Performed by: NURSE PRACTITIONER

## 2022-09-04 PROCEDURE — 99204 OFFICE O/P NEW MOD 45 MIN: CPT | Performed by: INTERNAL MEDICINE

## 2022-09-04 PROCEDURE — 80053 COMPREHEN METABOLIC PANEL: CPT | Performed by: NURSE PRACTITIONER

## 2022-09-04 RX ORDER — ATORVASTATIN CALCIUM 20 MG/1
20 TABLET, FILM COATED ORAL
Qty: 30 TABLET | Refills: 1 | Status: SHIPPED | OUTPATIENT
Start: 2022-09-04 | End: 2022-09-28 | Stop reason: SDUPTHER

## 2022-09-04 RX ORDER — CHLORAL HYDRATE 500 MG
2000 CAPSULE ORAL 2 TIMES DAILY
Qty: 120 CAPSULE | Refills: 0 | Status: SHIPPED | OUTPATIENT
Start: 2022-09-04

## 2022-09-04 RX ORDER — OXYCODONE HYDROCHLORIDE AND ACETAMINOPHEN 5; 325 MG/1; MG/1
1 TABLET ORAL EVERY 8 HOURS PRN
Qty: 10 TABLET | Refills: 0 | Status: SHIPPED | OUTPATIENT
Start: 2022-09-04

## 2022-09-04 RX ORDER — LISINOPRIL 40 MG/1
40 TABLET ORAL DAILY
Qty: 30 TABLET | Refills: 1 | Status: SHIPPED | OUTPATIENT
Start: 2022-09-04 | End: 2022-09-28 | Stop reason: SDUPTHER

## 2022-09-04 RX ORDER — ATORVASTATIN CALCIUM 40 MG/1
40 TABLET, FILM COATED ORAL
Status: DISCONTINUED | OUTPATIENT
Start: 2022-09-04 | End: 2022-09-04 | Stop reason: HOSPADM

## 2022-09-04 RX ORDER — CHLORAL HYDRATE 500 MG
2000 CAPSULE ORAL 2 TIMES DAILY
Status: DISCONTINUED | OUTPATIENT
Start: 2022-09-04 | End: 2022-09-04 | Stop reason: HOSPADM

## 2022-09-04 RX ADMIN — Medication 1000 UNITS: at 08:31

## 2022-09-04 RX ADMIN — LISINOPRIL 40 MG: 20 TABLET ORAL at 08:30

## 2022-09-04 RX ADMIN — EZETIMIBE 10 MG: 10 TABLET ORAL at 08:31

## 2022-09-04 RX ADMIN — ENOXAPARIN SODIUM 40 MG: 40 INJECTION SUBCUTANEOUS at 08:30

## 2022-09-04 RX ADMIN — Medication 12.5 MG: at 08:31

## 2022-09-04 RX ADMIN — ASPIRIN 81 MG: 81 TABLET, COATED ORAL at 08:30

## 2022-09-04 RX ADMIN — OMEGA-3 FATTY ACIDS CAP 1000 MG 2000 MG: 1000 CAP at 08:31
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
## 2022-09-04 NOTE — ASSESSMENT & PLAN NOTE
Lab Results   Component Value Date    HGBA1C 5 9 (H) 09/03/2022       Recent Labs     09/03/22  1443 09/03/22  1558 09/03/22  2129 09/04/22  0657   POCGLU 101 104 91 109       Blood Sugar Average: Last 72 hrs:  (P) 101 25   · Hold metformin  · Repeat A1c 5 9  · Monitor Accu-Cheks AC + H with Lispro insulin sliding scale coverage
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
## 2022-09-04 NOTE — PLAN OF CARE
Problem: CARDIOVASCULAR - ADULT  Goal: Absence of cardiac dysrhythmias or at baseline rhythm  Description: INTERVENTIONS:  - Continuous cardiac monitoring, vital signs, obtain 12 lead EKG if ordered  - Administer antiarrhythmic and heart rate control medications as ordered  - Monitor electrolytes and administer replacement therapy as ordered  Outcome: Progressing     Problem: MUSCULOSKELETAL - ADULT  Goal: Maintain or return mobility to safest level of function  Description: INTERVENTIONS:  - Assess patient's ability to carry out ADLs; assess patient's baseline for ADL function and identify physical deficits which impact ability to perform ADLs (bathing, care of mouth/teeth, toileting, grooming, dressing, etc )  - Assess/evaluate cause of self-care deficits   - Assess range of motion  - Assess patient's mobility  - Assess patient's need for assistive devices and provide as appropriate  - Encourage maximum independence but intervene and supervise when necessary  - Involve family in performance of ADLs  - Assess for home care needs following discharge   - Consider OT consult to assist with ADL evaluation and planning for discharge  - Provide patient education as appropriate  Outcome: Progressing     Problem: DISCHARGE PLANNING  Goal: Discharge to home or other facility with appropriate resources  Description: INTERVENTIONS:  - Identify barriers to discharge w/patient and caregiver  - Arrange for needed discharge resources and transportation as appropriate  - Identify discharge learning needs (meds, wound care, etc )  - Arrange for interpretive services to assist at discharge as needed  - Refer to Case Management Department for coordinating discharge planning if the patient needs post-hospital services based on physician/advanced practitioner order or complex needs related to functional status, cognitive ability, or social support system  Outcome: Progressing     Problem: Knowledge Deficit  Goal: Patient/family/caregiver demonstrates understanding of disease process, treatment plan, medications, and discharge instructions  Description: Complete learning assessment and assess knowledge base    Interventions:  - Provide teaching at level of understanding  - Provide teaching via preferred learning methods  Outcome: Progressing

## 2022-09-04 NOTE — OCCUPATIONAL THERAPY NOTE
Occupational Therapy Note     09/04/22 1125   Note Type   Note type Screen   Additional Comments OT screen completed  (Pt is independent for BADLs and functional mobility without use of AD  Pt independent with donning/doffing TLSO  No further skilled OT needs at this time   Follow up with MD  Recommending out-patient therapy/PT as appropriate )   Licensure   [place] License Number  Mele Knott [address], OTR/L [id number]

## 2022-09-04 NOTE — PLAN OF CARE
Problem: METABOLIC, FLUID AND ELECTROLYTES - ADULT  Goal: Electrolytes maintained within normal limits  Description: INTERVENTIONS:  - Monitor labs and assess patient for signs and symptoms of electrolyte imbalances  - Administer electrolyte replacement as ordered  - Monitor response to electrolyte replacements, including repeat lab results as appropriate  - Instruct patient on fluid and nutrition as appropriate  Outcome: Progressing  Goal: Fluid balance maintained  Description: INTERVENTIONS:  - Monitor labs   - Monitor I/O and WT  - Instruct patient on fluid and nutrition as appropriate  - Assess for signs & symptoms of volume excess or deficit  Outcome: Progressing  Goal: Glucose maintained within target range  Description: INTERVENTIONS:  - Monitor Blood Glucose as ordered  - Assess for signs and symptoms of hyperglycemia and hypoglycemia  - Administer ordered medications to maintain glucose within target range  - Assess nutritional intake and initiate nutrition service referral as needed  Outcome: Progressing     Problem: SKIN/TISSUE INTEGRITY - ADULT  Goal: Skin Integrity remains intact(Skin Breakdown Prevention)  Description: Assess:  -Perform Avery assessment every 2  -Clean and moisturize skin every 2  -Inspect skin when repositioning, toileting, and assisting with ADLS       Problem: CARDIOVASCULAR - ADULT  Goal: Absence of cardiac dysrhythmias or at baseline rhythm  Description: INTERVENTIONS:  - Continuous cardiac monitoring, vital signs, obtain 12 lead EKG if ordered  - Administer antiarrhythmic and heart rate control medications as ordered  - Monitor electrolytes and administer replacement therapy as ordered  Outcome: Progressing

## 2022-09-04 NOTE — ASSESSMENT & PLAN NOTE
Lipid panel: Triglycerides 184, HDL 32,    · Continue home medication, Zetia  · Add fish oil 2 capsules twice daily and Lipitor 20 mg daily with dinner   · Follow-up with PCP, TLCs, repeat lipid panel in 3 months

## 2022-09-04 NOTE — DISCHARGE INSTR - AVS FIRST PAGE
L1 compression fracture:  Please wear the TLSO brace when out of bed   Follow-up xrays in 2 weeks then see neurosurgery in the office   Take Percocet as needed for severe pain     Elevated blood pressure with elevated troponin level:  Go for an echocardiogram and stress test   Stop Norvasc  Increase Lisinopril to 40 mg daily  Start Metoprolol 12 5 mg BID     Dyslipidemia:  Continue Zetia  Start Lipitor 20 mg daily with dinner and start Fish Oil 2 capsules in the morning and 2 in the evening   Follow-up with primary doctor for repeat lipid panel in 3 months
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
## 2022-09-04 NOTE — ASSESSMENT & PLAN NOTE
BP elevated likely due to stress response and missing morning dose of BP medications  · Due to leg swelling, will discontinue Norvasc  · Increase Lisinopril from 20 mg to 40 mg daily  · Add metoprolol 12 5 mg twice daily  · Monitor BP; will need PCP follow-up

## 2022-09-04 NOTE — ASSESSMENT & PLAN NOTE
Troponin 5 -> 42 -> 79 with a Delta of 74  EKG shows NSR, HR 71, T wave inversion in lead 1   No chest pain  · Spoke with on-call cardiologist regarding consult; appreciate input  · No events on telemetry  · Troponin trending down   · Recommend adding metoprolol 12 5 mg twice daily, fish oil, and obtaining outpatient echocardiogram and stress tests  · Patient educated and minimal with this plan  · Patient to follow-up with PCP and cardiology

## 2022-09-04 NOTE — ASSESSMENT & PLAN NOTE
Possibly from deconditioning  · Recommend close outpatient follow-up  · Recommend outpatient echocardiogram and stress test

## 2022-09-04 NOTE — ASSESSMENT & PLAN NOTE
Blood pressure on admission 221/100 likely elevated due to pain, stress response, and skipping BP meds this morning   · Discontinued Norvasc 2/2 lower extremity swelling  · Increased Lisinopril from 20 mg to 40 mg daily   · BP improved   · Outpatient PCP follow-up
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
## 2022-09-06 ENCOUNTER — TELEPHONE (OUTPATIENT)
Dept: NEUROSURGERY | Facility: CLINIC | Age: 67
End: 2022-09-06

## 2022-09-06 ENCOUNTER — TRANSITIONAL CARE MANAGEMENT (OUTPATIENT)
Dept: FAMILY MEDICINE CLINIC | Facility: CLINIC | Age: 67
End: 2022-09-06

## 2022-09-06 LAB
ATRIAL RATE: 66 BPM
ATRIAL RATE: 73 BPM
P AXIS: 16 DEGREES
P AXIS: 76 DEGREES
PR INTERVAL: 126 MS
PR INTERVAL: 152 MS
QRS AXIS: 31 DEGREES
QRS AXIS: 61 DEGREES
QRSD INTERVAL: 84 MS
QRSD INTERVAL: 86 MS
QT INTERVAL: 418 MS
QT INTERVAL: 448 MS
QTC INTERVAL: 460 MS
QTC INTERVAL: 469 MS
T WAVE AXIS: 37 DEGREES
T WAVE AXIS: 64 DEGREES
VENTRICULAR RATE: 66 BPM
VENTRICULAR RATE: 73 BPM

## 2022-09-06 PROCEDURE — 93010 ELECTROCARDIOGRAM REPORT: CPT | Performed by: INTERNAL MEDICINE
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
## 2022-09-08 ENCOUNTER — HOSPITAL ENCOUNTER (OUTPATIENT)
Dept: NON INVASIVE DIAGNOSTICS | Facility: CLINIC | Age: 67
Discharge: HOME | End: 2022-09-08
Payer: OTHER MISCELLANEOUS

## 2022-09-08 ENCOUNTER — OFFICE VISIT (OUTPATIENT)
Dept: FAMILY MEDICINE CLINIC | Facility: CLINIC | Age: 67
End: 2022-09-08
Payer: OTHER MISCELLANEOUS

## 2022-09-08 VITALS
DIASTOLIC BLOOD PRESSURE: 90 MMHG | SYSTOLIC BLOOD PRESSURE: 124 MMHG | HEART RATE: 68 BPM | WEIGHT: 201 LBS | HEIGHT: 64 IN | BODY MASS INDEX: 34.31 KG/M2

## 2022-09-08 VITALS
RESPIRATION RATE: 16 BRPM | SYSTOLIC BLOOD PRESSURE: 124 MMHG | DIASTOLIC BLOOD PRESSURE: 90 MMHG | TEMPERATURE: 98.5 F | HEIGHT: 64 IN | HEART RATE: 68 BPM | OXYGEN SATURATION: 99 % | WEIGHT: 201 LBS | BODY MASS INDEX: 34.31 KG/M2

## 2022-09-08 DIAGNOSIS — E11.9 TYPE 2 DIABETES MELLITUS WITHOUT COMPLICATION, WITHOUT LONG-TERM CURRENT USE OF INSULIN (HCC): ICD-10-CM

## 2022-09-08 DIAGNOSIS — R77.8 ELEVATED TROPONIN: ICD-10-CM

## 2022-09-08 DIAGNOSIS — Z78.0 POST-MENOPAUSE: ICD-10-CM

## 2022-09-08 DIAGNOSIS — S32.010S COMPRESSION FRACTURE OF L1 VERTEBRA, SEQUELA: ICD-10-CM

## 2022-09-08 DIAGNOSIS — E78.5 HYPERLIPIDEMIA, UNSPECIFIED HYPERLIPIDEMIA TYPE: ICD-10-CM

## 2022-09-08 DIAGNOSIS — I10 BENIGN ESSENTIAL HYPERTENSION: Primary | ICD-10-CM

## 2022-09-08 DIAGNOSIS — F43.10 PTSD (POST-TRAUMATIC STRESS DISORDER): ICD-10-CM

## 2022-09-08 LAB
AORTIC ROOT: 2.8 CM
AORTIC VALVE MEAN VELOCITY: 9.6 M/S
APICAL FOUR CHAMBER EJECTION FRACTION: 70 %
ASCENDING AORTA: 3.2 CM
AV LVOT MEAN GRADIENT: 2 MMHG
AV LVOT PEAK GRADIENT: 4 MMHG
AV MEAN GRADIENT: 4 MMHG
AV PEAK GRADIENT: 8 MMHG
AV VELOCITY RATIO: 0.72
BASELINE ST DEPRESSION: 0 MM
DOP CALC AO PEAK VEL: 1.44 M/S
DOP CALC AO VTI: 30.31 CM
DOP CALC LVOT PEAK VEL VTI: 24.51 CM
DOP CALC LVOT PEAK VEL: 1.03 M/S
E WAVE DECELERATION TIME: 243 MS
FRACTIONAL SHORTENING: 53 % (ref 28–44)
INTERVENTRICULAR SEPTUM IN DIASTOLE (PARASTERNAL SHORT AXIS VIEW): 0.9 CM
INTERVENTRICULAR SEPTUM: 0.9 CM (ref 0.6–1.1)
LAAS-AP2: 18.1 CM2
LAAS-AP4: 18.9 CM2
LEFT ATRIUM SIZE: 4.1 CM
LEFT INTERNAL DIMENSION IN SYSTOLE: 2.1 CM (ref 2.1–4)
LEFT VENTRICULAR INTERNAL DIMENSION IN DIASTOLE: 4.5 CM (ref 3.5–6)
LEFT VENTRICULAR POSTERIOR WALL IN END DIASTOLE: 0.9 CM
LEFT VENTRICULAR STROKE VOLUME: 77 ML
LVSV (TEICH): 77 ML
MV E'TISSUE VEL-SEP: 11 CM/S
MV PEAK A VEL: 0.94 M/S
MV PEAK E VEL: 94 CM/S
MV STENOSIS PRESSURE HALF TIME: 70 MS
MV VALVE AREA P 1/2 METHOD: 3.14 CM2
NUC STRESS EJECTION FRACTION: 78 %
RA PRESSURE ESTIMATED: 8 MMHG
RATE PRESSURE PRODUCT: NORMAL
RIGHT VENTRICLE ID DIMENSION: 3.9 CM
RV PSP: 38 MMHG
SL CV LEFT ATRIUM LENGTH A2C: 4.9 CM
SL CV LV EF: 65
SL CV PED ECHO LEFT VENTRICLE DIASTOLIC VOLUME (MOD BIPLANE) 2D: 92 ML
SL CV PED ECHO LEFT VENTRICLE SYSTOLIC VOLUME (MOD BIPLANE) 2D: 15 ML
SL CV REST NUCLEAR ISOTOPE DOSE: 10.43 MCI
SL CV STRESS NUCLEAR ISOTOPE DOSE: 32.7 MCI
SL CV STRESS RECOVERY BP: NORMAL MMHG
SL CV STRESS RECOVERY HR: 92 BPM
SL CV STRESS RECOVERY O2 SAT: 99 %
STRESS ANGINA INDEX: 0
STRESS BASELINE BP: NORMAL MMHG
STRESS BASELINE HR: 62 BPM
STRESS O2 SAT REST: 99 %
STRESS PEAK HR: 107 BPM
STRESS POST O2 SAT PEAK: 98 %
STRESS POST PEAK BP: 152 MMHG
STRESS ST DEPRESSION: 0 MM
STRESS/REST PERFUSION RATIO: 1.07
TR MAX PG: 30 MMHG
TR PEAK VELOCITY: 2.7 M/S
TRICUSPID VALVE PEAK REGURGITATION VELOCITY: 2.74 M/S

## 2022-09-08 PROCEDURE — 93306 TTE W/DOPPLER COMPLETE: CPT

## 2022-09-08 PROCEDURE — 93016 CV STRESS TEST SUPVJ ONLY: CPT | Performed by: INTERNAL MEDICINE

## 2022-09-08 PROCEDURE — 78452 HT MUSCLE IMAGE SPECT MULT: CPT

## 2022-09-08 PROCEDURE — 99214 OFFICE O/P EST MOD 30 MIN: CPT | Performed by: FAMILY MEDICINE

## 2022-09-08 PROCEDURE — 93306 TTE W/DOPPLER COMPLETE: CPT | Performed by: INTERNAL MEDICINE

## 2022-09-08 PROCEDURE — A9502 TC99M TETROFOSMIN: HCPCS

## 2022-09-08 PROCEDURE — 78452 HT MUSCLE IMAGE SPECT MULT: CPT | Performed by: INTERNAL MEDICINE

## 2022-09-08 PROCEDURE — G1004 CDSM NDSC: HCPCS

## 2022-09-08 PROCEDURE — 93018 CV STRESS TEST I&R ONLY: CPT | Performed by: INTERNAL MEDICINE

## 2022-09-08 PROCEDURE — 93017 CV STRESS TEST TRACING ONLY: CPT

## 2022-09-08 RX ORDER — SEMAGLUTIDE 1.34 MG/ML
0.25 INJECTION, SOLUTION SUBCUTANEOUS WEEKLY
Qty: 1.5 ML | Refills: 6 | Status: SHIPPED | OUTPATIENT
Start: 2022-09-08

## 2022-09-08 RX ADMIN — REGADENOSON 0.4 MG: 0.08 INJECTION, SOLUTION INTRAVENOUS at 13:24

## 2022-09-08 NOTE — LETTER
September 8, 2022     Patient: Augusto Cord  YOB: 1955  Date of Visit: 9/8/2022      To Whom it May Concern: Verenice Caceres is under my professional care  Kenji Alvarado was seen in my office on 9/8/2022  Kenji Alvarado may return to work on 9/19/2022  If you have any questions or concerns, please don't hesitate to call           Sincerely,          Anjali Kaye MD        CC: No Recipients

## 2022-09-08 NOTE — RESULT ENCOUNTER NOTE
Message sent to patient on 09/08/2022: Abel Lyons,    Your nuclear stress test and echocardiogram tests did not show any major abnormality  Let us know if you have any questions      Jodi Fields MD from [address] [place] [address]
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
## 2022-09-09 LAB
CHEST PAIN STATEMENT: NORMAL
MAX DIASTOLIC BP: 80 MMHG
MAX HEART RATE: 110 BPM
MAX PREDICTED HEART RATE: 156 BPM
MAX. SYSTOLIC BP: 152 MMHG
PROTOCOL NAME: NORMAL
REASON FOR TERMINATION: NORMAL
TARGET HR FORMULA: NORMAL
TEST INDICATION: NORMAL
TIME IN EXERCISE PHASE: NORMAL

## 2022-09-15 ENCOUNTER — CONSULT (OUTPATIENT)
Dept: CARDIOLOGY CLINIC | Facility: CLINIC | Age: 67
End: 2022-09-15
Payer: OTHER MISCELLANEOUS

## 2022-09-15 VITALS
HEART RATE: 65 BPM | HEIGHT: 64 IN | SYSTOLIC BLOOD PRESSURE: 130 MMHG | TEMPERATURE: 97 F | BODY MASS INDEX: 34.31 KG/M2 | WEIGHT: 201 LBS | DIASTOLIC BLOOD PRESSURE: 78 MMHG | OXYGEN SATURATION: 98 %

## 2022-09-15 DIAGNOSIS — E78.2 MIXED DYSLIPIDEMIA: ICD-10-CM

## 2022-09-15 DIAGNOSIS — S32.010D COMPRESSION FRACTURE OF L1 VERTEBRA WITH ROUTINE HEALING, SUBSEQUENT ENCOUNTER: ICD-10-CM

## 2022-09-15 DIAGNOSIS — E11.00 TYPE 2 DIABETES MELLITUS WITH HYPEROSMOLARITY WITHOUT COMA, WITHOUT LONG-TERM CURRENT USE OF INSULIN (HCC): ICD-10-CM

## 2022-09-15 DIAGNOSIS — E66.9 CLASS 1 OBESITY: ICD-10-CM

## 2022-09-15 DIAGNOSIS — I10 ESSENTIAL HYPERTENSION: Primary | ICD-10-CM

## 2022-09-15 DIAGNOSIS — E55.9 VITAMIN D DEFICIENCY: ICD-10-CM

## 2022-09-15 DIAGNOSIS — R06.09 DYSPNEA ON EXERTION: ICD-10-CM

## 2022-09-15 DIAGNOSIS — R07.89 ATYPICAL CHEST PAIN: ICD-10-CM

## 2022-09-15 PROBLEM — E66.811 CLASS 1 OBESITY: Status: ACTIVE | Noted: 2022-09-03

## 2022-09-15 PROCEDURE — 93000 ELECTROCARDIOGRAM COMPLETE: CPT | Performed by: INTERNAL MEDICINE

## 2022-09-15 PROCEDURE — 99215 OFFICE O/P EST HI 40 MIN: CPT | Performed by: INTERNAL MEDICINE
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
## 2022-09-15 NOTE — PATIENT INSTRUCTIONS
Continue current medication  Give us a call if you begin to experience shortness of breath with routine and normal activities or if you develop chest discomfort brought on by fast walking or rushing about  We will be happy to re-evaluate you if the above symptoms to occur  At this time, there is no need for further cardiac testing

## 2022-09-16 ENCOUNTER — OFFICE VISIT (OUTPATIENT)
Dept: FAMILY MEDICINE CLINIC | Facility: CLINIC | Age: 67
End: 2022-09-16
Payer: OTHER MISCELLANEOUS

## 2022-09-16 VITALS
WEIGHT: 202 LBS | TEMPERATURE: 98.3 F | BODY MASS INDEX: 34.49 KG/M2 | HEIGHT: 64 IN | DIASTOLIC BLOOD PRESSURE: 80 MMHG | HEART RATE: 76 BPM | OXYGEN SATURATION: 98 % | RESPIRATION RATE: 14 BRPM | SYSTOLIC BLOOD PRESSURE: 130 MMHG

## 2022-09-16 DIAGNOSIS — S32.010S COMPRESSION FRACTURE OF L1 VERTEBRA, SEQUELA: Primary | ICD-10-CM

## 2022-09-16 DIAGNOSIS — I10 BENIGN ESSENTIAL HYPERTENSION: ICD-10-CM

## 2022-09-16 DIAGNOSIS — E11.9 TYPE 2 DIABETES MELLITUS WITHOUT COMPLICATION, WITHOUT LONG-TERM CURRENT USE OF INSULIN (HCC): ICD-10-CM

## 2022-09-16 PROCEDURE — 99214 OFFICE O/P EST MOD 30 MIN: CPT | Performed by: FAMILY MEDICINE

## 2022-09-16 NOTE — LETTER
September 16, 2022     Patient: Gregg Chan  YOB: 1955  Date of Visit: 9/16/2022      To Whom it May Concern: Kirk Hazel is under my professional care  Lora Espinoza was seen in my office on 9/16/2022  Lora Espinoza may return to work with not lifting > 10lbs  If you have any questions or concerns, please don't hesitate to call           Sincerely,          Kisha Wilcox MD        CC: No Recipients

## 2022-09-16 NOTE — LETTER
September 16, 2022     Patient: Lillie Quinn  YOB: 1955  Date of Visit: 9/16/2022      To Whom it May Concern: Nestor Mckee is under my professional care  George Andrade was seen in my office on 9/16/2022  Sirishamarilia  may not return to being  until cleared by her Neurosurgeon     If you have any questions or concerns, please don't hesitate to call           Sincerely,          Radha Nicholson MD        CC: No Recipients
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
## 2022-09-22 ENCOUNTER — HOSPITAL ENCOUNTER (OUTPATIENT)
Dept: RADIOLOGY | Facility: HOSPITAL | Age: 67
Discharge: HOME | End: 2022-09-22
Attending: FAMILY MEDICINE
Payer: COMMERCIAL

## 2022-09-22 DIAGNOSIS — S32.010S COMPRESSION FRACTURE OF L1 VERTEBRA, SEQUELA: ICD-10-CM

## 2022-09-22 DIAGNOSIS — Z78.0 POST-MENOPAUSE: ICD-10-CM

## 2022-09-22 PROCEDURE — 77080 DXA BONE DENSITY AXIAL: CPT

## 2022-09-27 ENCOUNTER — HOSPITAL ENCOUNTER (OUTPATIENT)
Dept: RADIOLOGY | Facility: HOSPITAL | Age: 67
Discharge: HOME | End: 2022-09-27
Payer: OTHER MISCELLANEOUS

## 2022-09-27 DIAGNOSIS — M48.50XA VERTEBRAL COMPRESSION FRACTURE (HCC): ICD-10-CM

## 2022-09-27 PROCEDURE — 72100 X-RAY EXAM L-S SPINE 2/3 VWS: CPT

## 2022-09-29 ENCOUNTER — OFFICE VISIT (OUTPATIENT)
Dept: NEUROSURGERY | Facility: CLINIC | Age: 67
End: 2022-09-29
Payer: OTHER MISCELLANEOUS

## 2022-09-29 VITALS
HEIGHT: 64 IN | SYSTOLIC BLOOD PRESSURE: 123 MMHG | TEMPERATURE: 98.2 F | BODY MASS INDEX: 34.49 KG/M2 | WEIGHT: 202 LBS | DIASTOLIC BLOOD PRESSURE: 88 MMHG | RESPIRATION RATE: 18 BRPM | HEART RATE: 74 BPM

## 2022-09-29 DIAGNOSIS — S32.019A L1 VERTEBRAL FRACTURE (HCC): Primary | ICD-10-CM

## 2022-09-29 PROCEDURE — 99213 OFFICE O/P EST LOW 20 MIN: CPT | Performed by: PHYSICIAN ASSISTANT

## 2022-09-29 RX ORDER — METHOCARBAMOL 500 MG/1
500 TABLET, FILM COATED ORAL 3 TIMES DAILY
Qty: 30 TABLET | Refills: 0 | Status: SHIPPED | OUTPATIENT
Start: 2022-09-29
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
## 2022-10-27 ENCOUNTER — OFFICE VISIT (OUTPATIENT)
Dept: NEUROSURGERY | Facility: CLINIC | Age: 67
End: 2022-10-27

## 2022-10-27 ENCOUNTER — TELEPHONE (OUTPATIENT)
Dept: FAMILY MEDICINE CLINIC | Facility: CLINIC | Age: 67
End: 2022-10-27

## 2022-10-27 VITALS
TEMPERATURE: 98.2 F | SYSTOLIC BLOOD PRESSURE: 140 MMHG | RESPIRATION RATE: 18 BRPM | HEART RATE: 77 BPM | BODY MASS INDEX: 34.49 KG/M2 | HEIGHT: 64 IN | WEIGHT: 202 LBS | DIASTOLIC BLOOD PRESSURE: 84 MMHG

## 2022-10-27 DIAGNOSIS — M62.838 MUSCLE SPASM: ICD-10-CM

## 2022-10-27 DIAGNOSIS — S32.018D OTHER CLOSED FRACTURE OF FIRST LUMBAR VERTEBRA WITH ROUTINE HEALING, SUBSEQUENT ENCOUNTER: Primary | ICD-10-CM
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
## 2022-10-27 NOTE — LETTER
October 27, 2022     Patient: Mary Jovel  YOB: 1955  Date of Visit: 10/27/2022      To Whom it May Concern: Mary Miller is under my professional care  Amy Yen was seen in my office on 10/27/22  Patient is not to return to work til further notice  Patient restrictions, No pulling, pushing, lifting  No stretching,  twisting or bending  If you have any questions or concerns, please don't hesitate to call           Sincerely,          Melonie Morris PA-C        CC: No Recipients

## 2022-11-03 ENCOUNTER — TELEPHONE (OUTPATIENT)
Dept: NEUROSURGERY | Facility: CLINIC | Age: 67
End: 2022-11-03

## 2022-11-03 NOTE — TELEPHONE ENCOUNTER
Received a call from [facility] a nurse CM with patients [facility]  She states that the employer is looking for clarification regarding her return to work letter and requested refax  They are confused by the statement that she should not return to work, but then restrictions listed       Refaxed letter as requested to [phone]

## 2022-11-11 ENCOUNTER — OFFICE VISIT (OUTPATIENT)
Dept: NEUROSURGERY | Facility: CLINIC | Age: 67
End: 2022-11-11

## 2022-11-11 VITALS
HEART RATE: 78 BPM | SYSTOLIC BLOOD PRESSURE: 140 MMHG | RESPIRATION RATE: 18 BRPM | WEIGHT: 202 LBS | BODY MASS INDEX: 34.49 KG/M2 | HEIGHT: 64 IN | DIASTOLIC BLOOD PRESSURE: 80 MMHG | TEMPERATURE: 98 F

## 2022-11-11 DIAGNOSIS — S32.019A L1 VERTEBRAL FRACTURE (HCC): Primary | ICD-10-CM
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
## 2022-11-11 NOTE — LETTER
November 11, 2022     Patient: Tedra Canavan  YOB: 1955  Date of Visit: 11/11/2022      To Whom it May Concern: Nikolas Patient is under my professional care  Harris Giraldo was seen in my office on 11/11/2022  Harris Giraldo will need to remain out of work until evaluated by physiatry for functional capacity  If you have any questions or concerns, please don't hesitate to call           Sincerely,          Martina Rosario PA-C        CC: Victor M Justice MD

## 2022-11-15 ENCOUNTER — TELEPHONE (OUTPATIENT)
Dept: NEUROSURGERY | Facility: CLINIC | Age: 67
End: 2022-11-15

## 2022-11-15 NOTE — TELEPHONE ENCOUNTER
Received call from RN case manager requesting we fax progress note from 11/11/22 office visit   This RN faxed requested documents to [phone]
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
## 2022-11-28 ENCOUNTER — CONSULT (OUTPATIENT)
Dept: PAIN MEDICINE | Facility: CLINIC | Age: 67
End: 2022-11-28

## 2022-11-28 VITALS
HEIGHT: 62 IN | HEART RATE: 93 BPM | BODY MASS INDEX: 35.77 KG/M2 | DIASTOLIC BLOOD PRESSURE: 102 MMHG | SYSTOLIC BLOOD PRESSURE: 146 MMHG | WEIGHT: 194.4 LBS

## 2022-11-28 DIAGNOSIS — S32.018D OTHER CLOSED FRACTURE OF FIRST LUMBAR VERTEBRA WITH ROUTINE HEALING, SUBSEQUENT ENCOUNTER: ICD-10-CM

## 2022-11-28 DIAGNOSIS — M62.838 MUSCLE SPASM: ICD-10-CM

## 2022-11-28 DIAGNOSIS — M79.18 MYOFASCIAL PAIN SYNDROME: Primary | ICD-10-CM

## 2022-11-28 RX ORDER — DICLOFENAC POTASSIUM 50 MG/1
TABLET, FILM COATED ORAL
COMMUNITY
Start: 2022-11-22

## 2022-11-30 ENCOUNTER — TELEPHONE (OUTPATIENT)
Dept: PAIN MEDICINE | Facility: MEDICAL CENTER | Age: 67
End: 2022-11-30

## 2022-11-30 NOTE — TELEPHONE ENCOUNTER
Caller: Tran Gorman      Doctor: Opal Kidd    Reason for call: Would like her most recent Ov report faxed over  Fax# [phone]  From worker comp injury    Call back#: [phone]

## 2022-12-05 DIAGNOSIS — I16.0 HYPERTENSIVE URGENCY: ICD-10-CM

## 2022-12-22 ENCOUNTER — OFFICE VISIT (OUTPATIENT)
Dept: FAMILY MEDICINE CLINIC | Facility: CLINIC | Age: 67
End: 2022-12-22

## 2022-12-22 VITALS
RESPIRATION RATE: 16 BRPM | SYSTOLIC BLOOD PRESSURE: 130 MMHG | TEMPERATURE: 98 F | BODY MASS INDEX: 36.07 KG/M2 | OXYGEN SATURATION: 99 % | DIASTOLIC BLOOD PRESSURE: 80 MMHG | HEART RATE: 89 BPM | WEIGHT: 196 LBS | HEIGHT: 62 IN

## 2022-12-22 DIAGNOSIS — Z23 ENCOUNTER FOR ADMINISTRATION OF VACCINE: ICD-10-CM

## 2022-12-22 DIAGNOSIS — R25.2 CRAMP IN LIMB: ICD-10-CM

## 2022-12-22 DIAGNOSIS — E11.9 TYPE 2 DIABETES MELLITUS WITHOUT COMPLICATION, WITHOUT LONG-TERM CURRENT USE OF INSULIN (HCC): Primary | ICD-10-CM

## 2022-12-22 DIAGNOSIS — S32.010D COMPRESSION FRACTURE OF L1 VERTEBRA WITH ROUTINE HEALING, SUBSEQUENT ENCOUNTER: ICD-10-CM

## 2022-12-22 LAB — SL AMB POCT HEMOGLOBIN AIC: 5.6 (ref ?–6.5)

## 2022-12-22 NOTE — PROGRESS NOTES
Lisa Worthington 1955 female MRN: [id number]    [facility] OFFICE VISIT  [facility] - [address]      ASSESSMENT/PLAN  Lisa Worthington is a 74 y o  female presents to the office for    Diagnoses and all orders for this visit:    Encounter for administration of vaccine  -     influenza vaccine, high-dose, PF 0 7 mL (FLUZONE HIGH-DOSE)  -     POCT hemoglobin A1c    Type 2 diabetes mellitus without complication, without long-term current use of insulin (HCC)  -     Semaglutide,0 25 or 0 5MG/DOS, 2 MG/1 5ML SOPN; Inject 0 5 mg under the skin once a week     increase patient's Ozempic to 0 5 mg  Compression fracture continue management per specialty team  Cramps of the limb rule out any electrolyte imbalance  Recommend starting magnesium 400 daily           Future Appointments   Date Time Provider Kaylee Palacios   1/9/2023  3:30 PM Tony Hernandez DO [facility]          SUBJECTIVE  CC: Diabetes      HPI:  Lisa Worthington is a 74 y o  female who presents for acute appointment  Patient states that she has been having cramps still  She states that is mostly in her legs  Patient has been seeing specialist for compression fracture  Patient is taking her medication as prescribed  Has not had any side effects with Ozempic  Review of Systems   Constitutional: Negative for activity change, appetite change, chills, fatigue and fever  HENT: Negative for congestion  Respiratory: Negative for cough, chest tightness and shortness of breath  Cardiovascular: Negative for chest pain and leg swelling  Gastrointestinal: Negative for abdominal distention, abdominal pain, constipation, diarrhea, nausea and vomiting  All other systems reviewed and are negative        Historical Information   The patient history was reviewed as follows:  Past Medical History:   Diagnosis Date   • Arthritis    • Arthropathy     ONSET: 07QBK4158   • Cellulitis     ONSET: 87DCY8254 • Colon polyp    • Costochondritis     ONSET: 27PCZ8035   • Dermatitis     ONSET: 63DZH3328   • Diabetes mellitus ([facility] [address] )     borderline   • Hemorrhagic detachment of retinal pigment epithelium     ONSET: 29BMR0302   • Hx of vertigo     one year ago 2020-treated-no further episodes   • Hypertension     LAST ASSESSED: 75ILA5259   • Ingrown nail     LAST ASSESSED: 55GYU8931   • Labyrinthitis     ONSET: 99ZXU9473   • Lymphadenopathy     ONSET: 80VAW5023   • Myalgia     ONSET: 29RKO5520   • Myositis     ONSET: 27HRJ2463   • Nonallopathic lesion     ONSET: 92ABZ2351   • Shortness of breath     ONSET: 29QYE0725-YJXI exertion-had cardiac workup with stress test-was "good" per patient  2/23/21  DS   • Systemic lupus erythematosus (HCC)     LAST ASSESSED: 55GBL4103   • Tinea corporis     ONSET: 65CWG2200   • Vertigo     LAST ASSESSED: 94XYT7387         Medications:     Current Outpatient Medications:   •  aspirin (ECOTRIN LOW STRENGTH) 81 mg EC tablet, Take 81 mg by mouth daily, Disp: , Rfl:   •  atorvastatin (LIPITOR) 20 mg tablet, Take 1 tablet (20 mg total) by mouth daily with dinner, Disp: 90 tablet, Rfl: 2  •  Cholecalciferol (VITAMIN D3) 3000 units TABS, Take 1,000 Units by mouth 2 (two) times a day  , Disp: , Rfl:   •  Coenzyme Q10 (COQ10) 200 MG CAPS, Take 1 capsule by mouth daily, Disp: , Rfl:   •  diclofenac potassium (CATAFLAM) 50 mg tablet, , Disp: , Rfl:   •  ezetimibe (ZETIA) 10 mg tablet, Take 1 tablet (10 mg total) by mouth daily, Disp: 90 tablet, Rfl: 2  •  lisinopril (ZESTRIL) 40 mg tablet, Take 1 tablet (40 mg total) by mouth daily, Disp: 90 tablet, Rfl: 2  •  metFORMIN (GLUCOPHAGE) 500 mg tablet, Take 1 tablet (500 mg total) by mouth 2 (two) times a day, Disp: 180 tablet, Rfl: 2  •  methocarbamol (ROBAXIN) 500 mg tablet, Take 1 tablet (500 mg total) by mouth 3 (three) times a day, Disp: 30 tablet, Rfl: 0  •  metoprolol tartrate (LOPRESSOR) 25 mg tablet, TAKE 0 5 TABLETS (12 5 MG TOTAL) BY MOUTH EVERY 12 (TWELVE) HOURS, Disp: 90 tablet, Rfl: 1  •  Omega-3 Fatty Acids (fish oil) 1,000 mg, Take 2 capsules (2,000 mg total) by mouth 2 (two) times a day, Disp: 120 capsule, Rfl: 0  •  Semaglutide,0 25 or 0 5MG/DOS, (Ozempic, 0 25 or 0 5 MG/DOSE,) 2 MG/1 5ML SOPN, Inject 0 25 mg under the skin once a week, Disp: 1 5 mL, Rfl: 6  •  oxyCODONE-acetaminophen (Percocet) 5-325 mg per tablet, Take 1 tablet by mouth every 8 (eight) hours as needed for severe pain for up to 10 doses Max Daily Amount: 3 tablets (Patient not taking: Reported on 11/28/2022), Disp: 10 tablet, Rfl: 0    Allergies   Allergen Reactions   • Other      Reaction Date: [id number]; Annotation - [id number]: rash   madlpn adhesive tape   • Penicillins Rash     Reaction Date: [id number]; Annotation - [id number]: jjw       OBJECTIVE  Vitals:   Vitals:    12/22/22 1759   BP: 130/80   BP Location: Left arm   Patient Position: Sitting   Cuff Size: Large   Pulse: 89   Resp: 16   Temp: 98 °F (36 7 °C)   SpO2: 99%   Weight: 88 9 kg (196 lb)   Height: 5' 2" (1 575 m)         Physical Exam  Vitals reviewed  Constitutional:       Appearance: She is well-developed  HENT:      Head: Normocephalic and atraumatic  Eyes:      Conjunctiva/sclera: Conjunctivae normal       Pupils: Pupils are equal, round, and reactive to light  Cardiovascular:      Rate and Rhythm: Normal rate and regular rhythm  Heart sounds: Normal heart sounds  Pulmonary:      Effort: Pulmonary effort is normal  No respiratory distress  Breath sounds: Normal breath sounds  Musculoskeletal:         General: Normal range of motion  Cervical back: Normal range of motion and neck supple  Skin:     General: Skin is warm  Capillary Refill: Capillary refill takes less than 2 seconds  Neurological:      Mental Status: She is alert and oriented to person, place, and time                      Humberto Og MD,   [facility]  12/22/2022

## 2022-12-27 ENCOUNTER — TELEPHONE (OUTPATIENT)
Dept: ADMINISTRATIVE | Facility: OTHER | Age: 67
End: 2022-12-27

## 2022-12-27 NOTE — TELEPHONE ENCOUNTER
Upon review of the In Basket request we have found that we are unable to obtain a copy of the documentation requested because immunizations needed were not specified and immunizations administered in network should populate in patient's chart  Any additional questions or concerns should be emailed to the Practice Liaisons via the appropriate education email address, please do not reply via In Basket      Thank you  Gregorio Izaguirre

## 2022-12-27 NOTE — TELEPHONE ENCOUNTER
----- Message from Mica Stacy sent at 12/22/2022  6:03 PM EST -----  Regarding: care gap request  12/22/22 6:03 PM    Hello, our patient attached above has had Immunization(s) completed/performed  Please assist in updating the patient chart by making an External outreach to [facility] located in [facility]  The date of service is [id number]      Thank you,  Mica Stacy  PG Renetta JOSE

## 2023-01-01 NOTE — TELEPHONE ENCOUNTER
Which medication is this? You can access the FollowMyHealth Patient Portal offered by [facility] by registering at the following website: [email or web address]. By joining [employer] [facility] [employer]’s FollowMyHealth portal, you will also be able to view your health information using other applications (apps) compatible with our system.
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
## 2023-01-09 ENCOUNTER — OFFICE VISIT (OUTPATIENT)
Dept: PAIN MEDICINE | Facility: CLINIC | Age: 68
End: 2023-01-09

## 2023-01-09 VITALS
DIASTOLIC BLOOD PRESSURE: 104 MMHG | BODY MASS INDEX: 35.74 KG/M2 | HEIGHT: 62 IN | SYSTOLIC BLOOD PRESSURE: 186 MMHG | WEIGHT: 194.2 LBS | HEART RATE: 89 BPM

## 2023-01-09 DIAGNOSIS — G89.4 CHRONIC PAIN SYNDROME: ICD-10-CM

## 2023-01-09 DIAGNOSIS — M54.2 NECK PAIN: ICD-10-CM

## 2023-01-09 DIAGNOSIS — M54.9 MID BACK PAIN: ICD-10-CM

## 2023-01-09 DIAGNOSIS — G89.29 CHRONIC BILATERAL LOW BACK PAIN WITHOUT SCIATICA: ICD-10-CM

## 2023-01-09 DIAGNOSIS — M54.50 CHRONIC BILATERAL LOW BACK PAIN WITHOUT SCIATICA: ICD-10-CM

## 2023-01-09 DIAGNOSIS — M79.18 MYOFASCIAL PAIN SYNDROME: Primary | ICD-10-CM

## 2023-01-09 NOTE — LETTER
January 9, 2023     Patient: Candace Wan  YOB: 1955  Date of Visit: 1/9/2023      To Whom it May Concern: Yohannes Aquino is under my professional care  Bishop Heller was seen in my office on 1/9/2023   Arron may return to work with limitations light work duty without heavy lifting >10 lbs       If you have any questions or concerns, please don't hesitate to call           Sincerely,          Tony Isbell DO        CC: No Recipients

## 2023-01-11 ENCOUNTER — TELEPHONE (OUTPATIENT)
Dept: PAIN MEDICINE | Facility: MEDICAL CENTER | Age: 68
End: 2023-01-11

## 2023-01-11 NOTE — TELEPHONE ENCOUNTER
Caller:  [facility]     Doctor/Office: Dr Gee Brush    CB#:       What needs to be faxed: xray referral     ATTN to: [facility]     Fax#: [phone]      Documents were successfully e-faxed

## 2023-01-12 ENCOUNTER — TELEPHONE (OUTPATIENT)
Dept: PAIN MEDICINE | Facility: MEDICAL CENTER | Age: 68
End: 2023-01-12

## 2023-01-12 NOTE — TELEPHONE ENCOUNTER
Caller: [facility]     Doctor/Office:     CB#: [phone]      What needs to be faxed: Xray referral    ATTN to: Laureen Spatz     Fax#:       Documents were successfully e-faxed

## 2023-01-27 ENCOUNTER — TELEPHONE (OUTPATIENT)
Dept: PAIN MEDICINE | Facility: CLINIC | Age: 68
End: 2023-01-27

## 2023-01-27 NOTE — TELEPHONE ENCOUNTER
Caller: Jadyn     Doctor/Office: Thom        Reason: Gayla Jacobs, patient's w/c mgr called to cancelled patient's appmts because patient has been seeing another pain mgt provider along with being treated at [facility]

## 2023-03-15 DIAGNOSIS — Z12.31 SCREENING MAMMOGRAM FOR BREAST CANCER: Primary | ICD-10-CM

## 2023-03-16 DIAGNOSIS — I16.0 HYPERTENSIVE URGENCY: ICD-10-CM

## 2023-03-16 DIAGNOSIS — E78.5 HYPERLIPIDEMIA, UNSPECIFIED HYPERLIPIDEMIA TYPE: ICD-10-CM

## 2023-03-16 RX ORDER — ATORVASTATIN CALCIUM 20 MG/1
TABLET, FILM COATED ORAL
Qty: 90 TABLET | Refills: 2 | Status: SHIPPED | OUTPATIENT
Start: 2023-03-16

## 2023-04-17 DIAGNOSIS — E11.9 TYPE 2 DIABETES MELLITUS WITHOUT COMPLICATION, WITHOUT LONG-TERM CURRENT USE OF INSULIN (HCC): ICD-10-CM

## 2023-04-20 DIAGNOSIS — E11.9 TYPE 2 DIABETES MELLITUS WITHOUT COMPLICATION, WITHOUT LONG-TERM CURRENT USE OF INSULIN (HCC): ICD-10-CM

## 2023-06-05 LAB
ALBUMIN SERPL-MCNC: 4.3 G/DL (ref 3.8–4.8)
ALBUMIN/GLOB SERPL: 1.7 {RATIO} (ref 1.2–2.2)
ALP SERPL-CCNC: 69 IU/L (ref 44–121)
ALT SERPL-CCNC: 13 IU/L (ref 0–32)
AST SERPL-CCNC: 15 IU/L (ref 0–40)
BASOPHILS # BLD AUTO: 0.1 X10E3/UL (ref 0–0.2)
BASOPHILS NFR BLD AUTO: 1 %
BILIRUB SERPL-MCNC: 0.7 MG/DL (ref 0–1.2)
BUN SERPL-MCNC: 13 MG/DL (ref 8–27)
BUN/CREAT SERPL: 17 (ref 12–28)
CALCIUM SERPL-MCNC: 9.5 MG/DL (ref 8.7–10.3)
CHLORIDE SERPL-SCNC: 103 MMOL/L (ref 96–106)
CO2 SERPL-SCNC: 23 MMOL/L (ref 20–29)
CREAT SERPL-MCNC: 0.76 MG/DL (ref 0.57–1)
EGFR: 87 ML/MIN/1.73
EOSINOPHIL # BLD AUTO: 0.2 X10E3/UL (ref 0–0.4)
EOSINOPHIL NFR BLD AUTO: 3 %
ERYTHROCYTE [DISTWIDTH] IN BLOOD BY AUTOMATED COUNT: 13.1 % (ref 11.7–15.4)
GLOBULIN SER-MCNC: 2.6 G/DL (ref 1.5–4.5)
GLUCOSE SERPL-MCNC: 94 MG/DL (ref 70–99)
HCT VFR BLD AUTO: 42.9 % (ref 34–46.6)
HGB BLD-MCNC: 13.9 G/DL (ref 11.1–15.9)
IMM GRANULOCYTES # BLD: 0 X10E3/UL (ref 0–0.1)
IMM GRANULOCYTES NFR BLD: 0 %
LYMPHOCYTES # BLD AUTO: 1.9 X10E3/UL (ref 0.7–3.1)
LYMPHOCYTES NFR BLD AUTO: 30 %
MCH RBC QN AUTO: 28.1 PG (ref 26.6–33)
MCHC RBC AUTO-ENTMCNC: 32.4 G/DL (ref 31.5–35.7)
MCV RBC AUTO: 87 FL (ref 79–97)
MONOCYTES # BLD AUTO: 0.4 X10E3/UL (ref 0.1–0.9)
MONOCYTES NFR BLD AUTO: 7 %
NEUTROPHILS # BLD AUTO: 3.7 X10E3/UL (ref 1.4–7)
NEUTROPHILS NFR BLD AUTO: 59 %
PLATELET # BLD AUTO: 337 X10E3/UL (ref 150–450)
POTASSIUM SERPL-SCNC: 4.6 MMOL/L (ref 3.5–5.2)
PROT SERPL-MCNC: 6.9 G/DL (ref 6–8.5)
RBC # BLD AUTO: 4.95 X10E6/UL (ref 3.77–5.28)
SODIUM SERPL-SCNC: 142 MMOL/L (ref 134–144)
WBC # BLD AUTO: 6.3 X10E3/UL (ref 3.4–10.8)

## 2023-06-22 ENCOUNTER — OFFICE VISIT (OUTPATIENT)
Dept: FAMILY MEDICINE CLINIC | Facility: CLINIC | Age: 68
End: 2023-06-22
Payer: COMMERCIAL

## 2023-06-22 VITALS
OXYGEN SATURATION: 99 % | HEIGHT: 61 IN | WEIGHT: 200 LBS | DIASTOLIC BLOOD PRESSURE: 70 MMHG | SYSTOLIC BLOOD PRESSURE: 134 MMHG | RESPIRATION RATE: 16 BRPM | HEART RATE: 75 BPM | BODY MASS INDEX: 37.76 KG/M2

## 2023-06-22 DIAGNOSIS — R30.0 DYSURIA: ICD-10-CM

## 2023-06-22 DIAGNOSIS — M85.80 OSTEOPENIA, UNSPECIFIED LOCATION: ICD-10-CM

## 2023-06-22 DIAGNOSIS — Z00.00 PHYSICAL EXAM: Primary | ICD-10-CM

## 2023-06-22 DIAGNOSIS — R42 VERTIGO: ICD-10-CM

## 2023-06-22 DIAGNOSIS — E11.00 TYPE 2 DIABETES MELLITUS WITH HYPEROSMOLARITY WITHOUT COMA, WITHOUT LONG-TERM CURRENT USE OF INSULIN (HCC): ICD-10-CM

## 2023-06-22 DIAGNOSIS — I10 BENIGN ESSENTIAL HYPERTENSION: ICD-10-CM

## 2023-06-22 LAB
CREAT ?TM UR-SCNC: 191.5 UMOL/L
EXT ALBUMIN URINE RANDOM: 31.4
MICROALBUMIN/CREAT UR: 16 MG/G{CREAT}
SL AMB  POCT GLUCOSE, UA: ABNORMAL
SL AMB LEUKOCYTE ESTERASE,UA: 500
SL AMB POCT BILIRUBIN,UA: ABNORMAL
SL AMB POCT BLOOD,UA: 50
SL AMB POCT CLARITY,UA: CLEAR
SL AMB POCT COLOR,UA: YELLOW
SL AMB POCT HEMOGLOBIN AIC: 5.4 (ref ?–6.5)
SL AMB POCT KETONES,UA: 15
SL AMB POCT NITRITE,UA: ABNORMAL
SL AMB POCT PH,UA: 5
SL AMB POCT SPECIFIC GRAVITY,UA: 1.02
SL AMB POCT URINE PROTEIN: ABNORMAL
SL AMB POCT UROBILINOGEN: ABNORMAL

## 2023-06-22 PROCEDURE — 81003 URINALYSIS AUTO W/O SCOPE: CPT | Performed by: FAMILY MEDICINE

## 2023-06-22 PROCEDURE — 99397 PER PM REEVAL EST PAT 65+ YR: CPT | Performed by: FAMILY MEDICINE

## 2023-06-22 PROCEDURE — 83036 HEMOGLOBIN GLYCOSYLATED A1C: CPT | Performed by: FAMILY MEDICINE

## 2023-06-22 RX ORDER — SULFAMETHOXAZOLE AND TRIMETHOPRIM 800; 160 MG/1; MG/1
1 TABLET ORAL EVERY 12 HOURS SCHEDULED
Qty: 10 TABLET | Refills: 0 | Status: SHIPPED | OUTPATIENT
Start: 2023-06-22 | End: 2023-06-27

## 2023-06-22 NOTE — PROGRESS NOTES
[address] [facility]    NAME: Kamilah Chadwick  AGE: 74 y o  SEX: female  : 1955     DATE: 2023     Assessment and Plan:     Problem List Items Addressed This Visit        Endocrine    Type 2 diabetes mellitus with hyperosmolarity without coma, without long-term current use of insulin (HCC)    Relevant Medications    sulfamethoxazole-trimethoprim (BACTRIM DS) 800-160 mg per tablet    semaglutide, 0 25 or 0 5 mg/dose, (Ozempic, 0 25 or 0 5 MG/DOSE,) 2 mg/3 mL injection pen    Other Relevant Orders    Albumin / creatinine urine ratio    POCT hemoglobin A1c (Completed)       Cardiovascular and Mediastinum    Benign essential hypertension       Musculoskeletal and Integument    Osteopenia       Other    Vertigo    Relevant Orders    Ambulatory Referral to Physical Therapy   Other Visit Diagnoses     Physical exam    -  Primary    Dysuria        Relevant Orders    POCT urine dip auto non-scope (Completed)    Urine culture      Seeing pain management through workers comp for back pain  Still UNCONTROLLED  HTN stable  DM improved on Ozempic, continue on 0 25 given patient ; consider Metformin  UTI (+) Tx above  Immunizations and preventive care screenings were discussed with patient today  Appropriate education was printed on patient's after visit summary  Vertigo PT      BMI Counseling: Body mass index is 37 79 kg/m²  The BMI is above normal  Nutrition recommendations include decreasing fast food intake and consuming healthier snacks  Exercise recommendations include exercising 3-5 times per week  Rationale for BMI follow-up plan is due to patient being overweight or obese  Depression Screening and Follow-up Plan: Patient was screened for depression during today's encounter  They screened negative with a PHQ-2 score of 1  Return in about 6 months (around 2023) for DM check       Chief Complaint:     Chief Complaint Patient presents with   • Physical Exam     Vaginal pressure, frequent urination       History of Present Illness:     Adult Annual Physical   Patient here for a comprehensive physical exam    UTI symptoms  Vaginal pressure, frequent urination  Its hurting bad  Mid thoracic, lumbar, neck in total will have 3 injections  Working with workers comp doc  Taking vitamins for [employer]  Vertigo positive would like to see if she can do balance therapy like she did several years ago to see if it helps her    Diet and Physical Activity  Diet/Nutrition: well balanced diet  Exercise: no formal exercise  Depression Screening  PHQ-2/9 Depression Screening    Little interest or pleasure in doing things: 1 - several days  Feeling down, depressed, or hopeless: 0 - not at all  PHQ-2 Score: 1  PHQ-2 Interpretation: Negative depression screen       General Health  Sleep: gets 7-8 hours of sleep on average  Hearing: normal - bilateral   Vision: no vision problems and wears glasses  Eye 6 months Retina doctor Amanda is covering vision retine doctor July will be helping   Dental: regular dental visits  Review of Systems:     Review of Systems   Constitutional: Negative for activity change, appetite change, chills, fatigue and fever  HENT: Negative for congestion  Respiratory: Negative for cough, chest tightness and shortness of breath  Cardiovascular: Negative for chest pain and leg swelling  Gastrointestinal: Negative for abdominal distention, abdominal pain, constipation, diarrhea, nausea and vomiting  Genitourinary: Positive for dysuria and frequency  Musculoskeletal: Positive for back pain and neck pain  Neurological: Positive for dizziness  All other systems reviewed and are negative       Past Medical History:     Past Medical History:   Diagnosis Date   • Arthritis    • Arthropathy     ONSET: 70EOZ7617   • Cellulitis     ONSET: 72OJO8691   • Colon polyp    • Costochondritis     ONSET: "96MZJ5560   • Dermatitis     ONSET: 04RHK8790   • Diabetes mellitus ([facility] [address] )     borderline   • Hemorrhagic detachment of retinal pigment epithelium     ONSET: 40EZI2295   • Hx of vertigo     one year ago 2020-treated-no further episodes   • Hypertension     LAST ASSESSED: 51PPL4028   • Ingrown nail     LAST ASSESSED: 31PEB4480   • Labyrinthitis     ONSET: 30NOV2007   • Lymphadenopathy     ONSET: 10OCT2007   • Myalgia     ONSET: 62RFX5856   • Myositis     ONSET: 22BOE1417   • Nonallopathic lesion     ONSET: 57OBG2048   • Shortness of breath     ONSET: 55MCF8340-UBLL exertion-had cardiac workup with stress test-was \"good\" per patient  2/23/21  DS   • [facility]     LAST ASSESSED: 91NBF9105   • Tinea corporis     ONSET: 49SSA5349   • Vertigo     LAST ASSESSED: 81XZU8714      Past Surgical History:     Past Surgical History:   Procedure Laterality Date   • COLONOSCOPY     • EGD     • FINGER SURGERY      left index      Social History:     Social History     Socioeconomic History   • Marital status: Single     Spouse name: None   • Number of children: None   • Years of education: None   • Highest education level: None   Occupational History   • None   Tobacco Use   • Smoking status: Never   • Smokeless tobacco: Never   Vaping Use   • Vaping Use: Never used   Substance and Sexual Activity   • Alcohol use: Yes     Comment: rare   • Drug use: No   • Sexual activity: Not Currently   Other Topics Concern   • None   Social History Narrative    DAILY COLA CONSUMPTION (1 CANS/DAY)     Social Determinants of Health     Financial Resource Strain: Not on file   Food Insecurity: Not on file   Transportation Needs: Not on file   Physical Activity: Not on file   Stress: Not on file   Social Connections: Not on file   Intimate Partner Violence: Not on file   Housing Stability: Not on file      Family History:     Family History   Problem Relation Age of Onset   • Stomach cancer Mother         MALIGNANT NEOPLASM " "  • Hypertension Father    • Coronary artery disease Sister    • Coronary artery disease Brother    • Other Brother         CARDIAC PACEMAKER, CARDIOMEGALY   • Diabetes Sister       Current Medications:     Current Outpatient Medications   Medication Sig Dispense Refill   • aspirin (ECOTRIN LOW STRENGTH) 81 mg EC tablet Take 81 mg by mouth daily     • atorvastatin (LIPITOR) 20 mg tablet TAKE 1 TABLET BY MOUTH EVERY DAY WITH DINNER 90 tablet 2   • Cholecalciferol (VITAMIN D3) 3000 units TABS Take 1,000 Units by mouth 2 (two) times a day       • Coenzyme Q10 (COQ10) 200 MG CAPS Take 1 capsule by mouth daily     • ezetimibe (ZETIA) 10 mg tablet Take 1 tablet (10 mg total) by mouth daily 90 tablet 2   • lisinopril (ZESTRIL) 40 mg tablet Take 1 tablet (40 mg total) by mouth daily 90 tablet 0   • metFORMIN (GLUCOPHAGE) 500 mg tablet Take 1 tablet (500 mg total) by mouth 2 (two) times a day 180 tablet 0   • metoprolol tartrate (LOPRESSOR) 25 mg tablet TAKE 0 5 TABLETS (12 5 MG TOTAL) BY MOUTH EVERY 12 (TWELVE) HOURS 90 tablet 1   • Omega-3 Fatty Acids (fish oil) 1,000 mg Take 2 capsules (2,000 mg total) by mouth 2 (two) times a day 120 capsule 0   • semaglutide, 0 25 or 0 5 mg/dose, (Ozempic, 0 25 or 0 5 MG/DOSE,) 2 mg/3 mL injection pen Inject 0 38 mL (0 2533 mg total) under the skin every 7 days 6 mL 6   • sulfamethoxazole-trimethoprim (BACTRIM DS) 800-160 mg per tablet Take 1 tablet by mouth every 12 (twelve) hours for 5 days 10 tablet 0     No current facility-administered medications for this visit  Allergies: Allergies   Allergen Reactions   • Other      Reaction Date: [id number]; Annotation - [id number]: rash   madlpn adhesive tape   • Penicillins Rash     Reaction Date: [id number];  Annotation - [id number]: jjw      Physical Exam:     /70 (BP Location: Right arm, Patient Position: Sitting, Cuff Size: Large)   Pulse 75   Resp 16   Ht 5' 1\" (1 549 m)   Wt 90 7 kg (200 lb)   SpO2 99%   BMI 37 79 kg/m² " Physical Exam  Vitals reviewed  Constitutional:       Appearance: Normal appearance  She is well-developed  HENT:      Head: Normocephalic and atraumatic  Right Ear: Tympanic membrane, ear canal and external ear normal  There is no impacted cerumen  Left Ear: Tympanic membrane, ear canal and external ear normal  There is no impacted cerumen  Nose: Nose normal       Mouth/Throat:      Mouth: Mucous membranes are moist       Pharynx: Oropharynx is clear  Eyes:      Conjunctiva/sclera: Conjunctivae normal       Pupils: Pupils are equal, round, and reactive to light  Cardiovascular:      Rate and Rhythm: Normal rate and regular rhythm  Pulses: no weak pulses          Dorsalis pedis pulses are 2+ on the right side and 2+ on the left side  Posterior tibial pulses are 2+ on the right side and 2+ on the left side  Heart sounds: Normal heart sounds  Pulmonary:      Effort: Pulmonary effort is normal       Breath sounds: Normal breath sounds  Abdominal:      General: Abdomen is flat  Bowel sounds are normal    Genitourinary:     General: Normal vulva  Musculoskeletal:         General: Normal range of motion  Cervical back: Normal range of motion and neck supple  Feet:      Right foot:      Skin integrity: No ulcer, skin breakdown, erythema, warmth, callus or dry skin  Left foot:      Skin integrity: No ulcer, skin breakdown, erythema, warmth, callus or dry skin  Skin:     General: Skin is warm  Capillary Refill: Capillary refill takes less than 2 seconds  Neurological:      General: No focal deficit present  Mental Status: She is alert and oriented to person, place, and time  Mental status is at baseline  Psychiatric:         Mood and Affect: Mood normal          Behavior: Behavior normal          Thought Content: Thought content normal          Judgment: Judgment normal           Diabetic Foot Exam    Patient's shoes and socks removed      Right Foot/Ankle   Right Foot Inspection  Skin Exam: skin normal and skin intact  No dry skin, no warmth, no callus, no erythema, no maceration, no abnormal color, no pre-ulcer, no ulcer and no callus  Toe Exam: ROM and strength within normal limits  No swelling    Sensory   Vibration: intact  Proprioception: intact  Monofilament testing: intact    Vascular  Capillary refills: < 3 seconds  The right DP pulse is 2+  The right PT pulse is 2+  Left Foot/Ankle  Left Foot Inspection  Skin Exam: skin normal and skin intact  No dry skin, no warmth, no erythema, no maceration, normal color, no pre-ulcer, no ulcer and no callus  Toe Exam: ROM and strength within normal limits  No swelling  Sensory   Vibration: intact  Proprioception: intact  Monofilament testing: intact    Vascular  Capillary refills: < 3 seconds  The left DP pulse is 2+  The left PT pulse is 2+       Assign Risk Category  No deformity present  No loss of protective sensation  No weak pulses  Risk: 0      Janae Seay MD  [address]

## 2023-06-26 ENCOUNTER — HOSPITAL ENCOUNTER (OUTPATIENT)
Dept: MAMMOGRAPHY | Facility: HOSPITAL | Age: 68
Discharge: HOME | End: 2023-06-26
Payer: COMMERCIAL

## 2023-06-26 VITALS — WEIGHT: 199.96 LBS | HEIGHT: 61 IN | BODY MASS INDEX: 37.75 KG/M2

## 2023-06-26 DIAGNOSIS — Z12.31 SCREENING MAMMOGRAM FOR BREAST CANCER: ICD-10-CM

## 2023-06-26 LAB
ALBUMIN/CREAT UR: 16 MG/G CREAT (ref 0–29)
BACTERIA UR CULT: NORMAL
CREAT UR-MCNC: 191.5 MG/DL
Lab: NO GROWTH
MICROALBUMIN UR-MCNC: 31.4 UG/ML

## 2023-06-26 PROCEDURE — 77063 BREAST TOMOSYNTHESIS BI: CPT

## 2023-06-26 PROCEDURE — 77067 SCR MAMMO BI INCL CAD: CPT

## 2023-07-18 LAB
LEFT EYE DIABETIC RETINOPATHY: NORMAL
RIGHT EYE DIABETIC RETINOPATHY: NORMAL

## 2023-07-18 PROCEDURE — 2023F DILAT RTA XM W/O RTNOPTHY: CPT | Performed by: FAMILY MEDICINE

## 2023-07-22 NOTE — TELEPHONE ENCOUNTER
Advised pt  She has a new insurance plan and was told dr rincon in [place] are out of network    She will confirm and then schedule an appt
Pl, advise pt to establish with Dr Wilkinson since John hollins
lmom
no

## 2023-09-07 DIAGNOSIS — E11.9 TYPE 2 DIABETES MELLITUS WITHOUT COMPLICATION, WITHOUT LONG-TERM CURRENT USE OF INSULIN (HCC): ICD-10-CM

## 2023-09-12 ENCOUNTER — TELEPHONE (OUTPATIENT)
Dept: FAMILY MEDICINE CLINIC | Facility: CLINIC | Age: 68
End: 2023-09-12

## 2023-09-12 DIAGNOSIS — M32.9 LUPUS (HCC): Primary | ICD-10-CM

## 2023-09-12 NOTE — TELEPHONE ENCOUNTER
----- Message from Carolyne Hernandez MD sent at 9/12/2023  6:10 PM EDT -----  Regarding: RE: lupus  Contact: [phone]  Can we fax this referral to [facility] office    ----- Message -----  From: Manju Patelick  Sent: 9/12/2023   4:19 PM EDT  To: Carolyne Hernandez MD  Subject: FW: lupus                                          ----- Message -----  From: Arun Koffia: 9/12/2023   4:03 PM EDT  To: [facility]  Subject: lupus                                            Carmina I need a new doctor my old doctor isn’t with my insurance anymore . Plus I haven’t seen the other doctor in over 2 years. This new doctor is telling me I need a referral copy of lab work and why I should be seeing them for lupus .  I called Dave Farias at [address] [facility]  [address] [phone]

## 2023-11-24 ENCOUNTER — OFFICE VISIT (OUTPATIENT)
Dept: URGENT CARE | Facility: CLINIC | Age: 68
End: 2023-11-24
Payer: COMMERCIAL

## 2023-11-24 VITALS
HEART RATE: 76 BPM | RESPIRATION RATE: 16 BRPM | OXYGEN SATURATION: 98 % | SYSTOLIC BLOOD PRESSURE: 125 MMHG | WEIGHT: 213 LBS | TEMPERATURE: 98.8 F | DIASTOLIC BLOOD PRESSURE: 78 MMHG | BODY MASS INDEX: 40.22 KG/M2 | HEIGHT: 61 IN

## 2023-11-24 DIAGNOSIS — I16.0 HYPERTENSIVE URGENCY: ICD-10-CM

## 2023-11-24 DIAGNOSIS — K11.21 PAROTITIS, ACUTE: Primary | ICD-10-CM

## 2023-11-24 PROCEDURE — 99213 OFFICE O/P EST LOW 20 MIN: CPT | Performed by: PHYSICIAN ASSISTANT

## 2023-11-24 RX ORDER — AZITHROMYCIN 250 MG/1
TABLET, FILM COATED ORAL
Qty: 6 TABLET | Refills: 0 | Status: SHIPPED | OUTPATIENT
Start: 2023-11-24 | End: 2023-11-28

## 2023-11-24 NOTE — PATIENT INSTRUCTIONS
1.  Over-the-counter acetaminophen as needed for pain. 2.  Apply warm compresses to the affected area 4 times daily for 30 minutes until improved. 3.  Perform sialagogues as discussed several times daily until the symptoms improve. 4.  Follow-up with ENT for any recurrent or persistent symptoms. 5.  Go to the ER immediately for any significantly worsening symptoms.

## 2023-11-24 NOTE — PROGRESS NOTES
[facility]        NAME: Jeffery Clayton is a 79 y.o. female  : 1955    MRN: [id number]  DATE: 2023  TIME: 5:03 PM    Assessment and Plan   Parotitis, acute [K11.21]  1. Parotitis, acute  azithromycin (ZITHROMAX) 250 mg tablet          Based on history and physical exam I suspect probable parotitis and will treat as such. Patient knows to go to the ER immediately for any worsening symptoms. I advised her to follow-up with ENT for any recurrent or persistent symptoms. Patient Instructions     1. Over-the-counter acetaminophen as needed for pain. 2.  Apply warm compresses to the affected area 4 times daily for 30 minutes until improved. 3.  Perform sialagogues as discussed several times daily until the symptoms improve. 4.  Follow-up with ENT for any recurrent or persistent symptoms. 5.  Go to the ER immediately for any significantly worsening symptoms. Chief Complaint     Chief Complaint   Patient presents with    left ear pain     Left ear pain along with the side her head that radiates down to her jaw. Afebrile when symptoms started         History of Present Illness       16-year-old female patient with a 2 to 3-day history of pain and swelling localized to the front part of the ear with pain that radiates to the ear into the posterior aspect of the ear. Patient states that the discomfort is made worse by chewing and salivating. He denies any preceding cold symptoms, allergy symptoms. She has not tried any cool or warm compresses. Patient denies any dental pain. No decrease in hearing. No ear pain or discharge. Review of Systems   Review of Systems   Constitutional:  Negative for chills and fever. HENT:  Positive for ear pain and facial swelling. Negative for sore throat. Eyes:  Negative for pain and visual disturbance. Respiratory:  Negative for cough and shortness of breath. Cardiovascular:  Negative for chest pain and palpitations. Gastrointestinal:  Negative for abdominal pain and vomiting. Genitourinary:  Negative for dysuria and hematuria. Musculoskeletal:  Negative for arthralgias and back pain. Skin:  Negative for color change and rash. Neurological:  Negative for seizures and syncope. All other systems reviewed and are negative.         Current Medications       Current Outpatient Medications:     aspirin (ECOTRIN LOW STRENGTH) 81 mg EC tablet, Take 81 mg by mouth daily, Disp: , Rfl:     atorvastatin (LIPITOR) 20 mg tablet, TAKE 1 TABLET BY MOUTH EVERY DAY WITH DINNER, Disp: 90 tablet, Rfl: 2    azithromycin (ZITHROMAX) 250 mg tablet, Take 2 tablets today then 1 tablet daily x 4 days, Disp: 6 tablet, Rfl: 0    Cholecalciferol (VITAMIN D3) 3000 units TABS, Take 1,000 Units by mouth 2 (two) times a day  , Disp: , Rfl:     Coenzyme Q10 (COQ10) 200 MG CAPS, Take 1 capsule by mouth daily, Disp: , Rfl:     ezetimibe (ZETIA) 10 mg tablet, Take 1 tablet (10 mg total) by mouth daily, Disp: 90 tablet, Rfl: 2    lisinopril (ZESTRIL) 40 mg tablet, Take 1 tablet (40 mg total) by mouth daily, Disp: 90 tablet, Rfl: 0    metFORMIN (GLUCOPHAGE) 500 mg tablet, TAKE 1 TABLET BY MOUTH TWICE A DAY, Disp: 180 tablet, Rfl: 0    metoprolol tartrate (LOPRESSOR) 25 mg tablet, TAKE 0.5 TABLETS (12.5 MG TOTAL) BY MOUTH EVERY 12 (TWELVE) HOURS, Disp: 90 tablet, Rfl: 1    Omega-3 Fatty Acids (fish oil) 1,000 mg, Take 2 capsules (2,000 mg total) by mouth 2 (two) times a day, Disp: 120 capsule, Rfl: 0    Current Allergies     Allergies as of 11/24/2023 - Reviewed 11/24/2023   Allergen Reaction Noted    Other  01/25/2005    Penicillins Rash 02/16/2004            The following portions of the patient's history were reviewed and updated as appropriate: allergies, current medications, past family history, past medical history, past social history, past surgical history and problem list.     Past Medical History:   Diagnosis Date    Arthritis     Arthropathy ONSET: [id number]    Cellulitis     ONSET: [id number]    Colon polyp     Costochondritis     ONSET: [id number]    Dermatitis     ONSET: 85FIP4490    Diabetes mellitus (HCC)     borderline    Hemorrhagic detachment of retinal pigment epithelium     ONSET: 58XUM0795    Hx of vertigo     one year ago 2020-treated-no further episodes    Hypertension     LAST ASSESSED: [id number]    Ingrown nail     LAST ASSESSED: 50APZ5406    Labyrinthitis     ONSET: 93CVO9317    Lymphadenopathy     ONSET: 96TDU2107    Myalgia     ONSET: 49BGE9242    Myositis     ONSET: 22TTV4908    Nonallopathic lesion     ONSET: 42LLG8734    Shortness of breath     ONSET: 50TOY4203-OJTG exertion-had cardiac workup with stress test-was "good" per patient  2/23/21  DS    Systemic lupus erythematosus ([address])     LAST ASSESSED: 58YEB2320    Tinea corporis     ONSET: 96PAX5686    Vertigo     LAST ASSESSED: 20ZNA1960       Past Surgical History:   Procedure Laterality Date    COLONOSCOPY      EGD      FINGER SURGERY      left index       Family History   Problem Relation Age of Onset    Stomach cancer Mother         MALIGNANT NEOPLASM    Hypertension Father     Coronary artery disease Sister     Diabetes Sister     No Known Problems Daughter     Coronary artery disease Brother     Other Brother         CARDIAC PACEMAKER, CARDIOMEGALY    No Known Problems Brother          Medications have been verified. Objective   /78   Pulse 76   Temp 98.8 °F (37.1 °C) (Tympanic)   Resp 16   Ht 5' 1" (1.549 m)   Wt 96.6 kg (213 lb)   SpO2 98%   BMI 40.25 kg/m²        Physical Exam     Physical Exam  Vitals and nursing note reviewed. Constitutional:       General: She is not in acute distress. Appearance: Normal appearance. She is not ill-appearing. HENT:      Head: Normocephalic.       Right Ear: Tympanic membrane, ear canal and external ear normal.      Left Ear: Tympanic membrane, ear canal and external ear normal.      Nose: Nose normal. No congestion or rhinorrhea. Mouth/Throat:      Mouth: Mucous membranes are moist.      Pharynx: Oropharynx is clear. Comments: There is subtle left preauricular/parotid swelling and tenderness. intraoral exam is normal.  No ear canal or eardrum findings. No left cervical adenopathy. Eyes:      Conjunctiva/sclera: Conjunctivae normal.      Pupils: Pupils are equal, round, and reactive to light. Cardiovascular:      Rate and Rhythm: Normal rate. Pulmonary:      Effort: Pulmonary effort is normal.   Abdominal:      Tenderness: There is no abdominal tenderness. Musculoskeletal:         General: Normal range of motion. Cervical back: Normal range of motion. Skin:     General: Skin is warm and dry. Capillary Refill: Capillary refill takes less than 2 seconds. Neurological:      Mental Status: She is alert and oriented to person, place, and time.    Psychiatric:         Mood and Affect: Mood normal.         Behavior: Behavior normal.

## 2023-12-13 DIAGNOSIS — E78.5 HYPERLIPIDEMIA, UNSPECIFIED HYPERLIPIDEMIA TYPE: ICD-10-CM

## 2023-12-13 DIAGNOSIS — E11.9 TYPE 2 DIABETES MELLITUS WITHOUT COMPLICATION, WITHOUT LONG-TERM CURRENT USE OF INSULIN (HCC): ICD-10-CM

## 2023-12-13 DIAGNOSIS — E78.5 HYPERLIPIDEMIA, UNSPECIFIED HYPERLIPIDEMIA TYPE: Primary | ICD-10-CM

## 2023-12-13 RX ORDER — EZETIMIBE 10 MG/1
10 TABLET ORAL DAILY
Qty: 90 TABLET | Refills: 0 | Status: SHIPPED | OUTPATIENT
Start: 2023-12-13

## 2023-12-14 ENCOUNTER — TELEPHONE (OUTPATIENT)
Age: 68
End: 2023-12-14

## 2023-12-14 NOTE — TELEPHONE ENCOUNTER
Called patient . She goes to [address] [place] [address]. She will pull lab order from my chart and have it done prior to her appointment.

## 2023-12-23 ENCOUNTER — NURSE TRIAGE (OUTPATIENT)
Dept: OTHER | Facility: OTHER | Age: 68
End: 2023-12-23

## 2023-12-23 NOTE — TELEPHONE ENCOUNTER
"Regarding: Lab Work order needs to be sent to [facility]  ----- Message from Medina Cote sent at 12/23/2023  9:39 AM EST -----  \" I need my Lab Work Prescription Faxed to [facility], they close at 11:00 am.  Please Fax to [phone].\"    "

## 2023-12-23 NOTE — TELEPHONE ENCOUNTER
"Reason for Disposition  • Question about upcoming scheduled test, no triage required and triager able to answer question    Answer Assessment - Initial Assessment Questions  1. REASON FOR CALL or QUESTION: \"What is your reason for calling today?\" or \"How can I best help you?\" or \"What question do you have that I can help answer?\"      Patient called in to have lab orders faxed to [facility]. Verified lab work and faxed 3 orders to lab [facility] at the fax number provided. Asked patient to call back with any concerns or problems.    Protocols used: Information Only Call - No Triage-ADULT-    "

## 2023-12-24 LAB
ALBUMIN SERPL-MCNC: 4.3 G/DL (ref 3.9–4.9)
ALBUMIN/GLOB SERPL: 1.7 {RATIO} (ref 1.2–2.2)
ALP SERPL-CCNC: 73 IU/L (ref 44–121)
ALT SERPL-CCNC: 12 IU/L (ref 0–32)
AST SERPL-CCNC: 15 IU/L (ref 0–40)
BILIRUB SERPL-MCNC: 0.8 MG/DL (ref 0–1.2)
BUN SERPL-MCNC: 13 MG/DL (ref 8–27)
BUN/CREAT SERPL: 18 (ref 12–28)
CALCIUM SERPL-MCNC: 9.3 MG/DL (ref 8.7–10.3)
CHLORIDE SERPL-SCNC: 103 MMOL/L (ref 96–106)
CHOLEST SERPL-MCNC: 136 MG/DL (ref 100–199)
CO2 SERPL-SCNC: 24 MMOL/L (ref 20–29)
CREAT SERPL-MCNC: 0.72 MG/DL (ref 0.57–1)
EGFR: 92 ML/MIN/1.73
GLOBULIN SER-MCNC: 2.6 G/DL (ref 1.5–4.5)
GLUCOSE SERPL-MCNC: 88 MG/DL (ref 70–99)
HBA1C MFR BLD: 5.8 % (ref 4.8–5.6)
HDLC SERPL-MCNC: 60 MG/DL
LDLC SERPL CALC-MCNC: 60 MG/DL (ref 0–99)
MICRODELETION SYND BLD/T FISH: NORMAL
POTASSIUM SERPL-SCNC: 4.3 MMOL/L (ref 3.5–5.2)
PROT SERPL-MCNC: 6.9 G/DL (ref 6–8.5)
SL AMB VLDL CHOLESTEROL CALC: 16 MG/DL (ref 5–40)
SODIUM SERPL-SCNC: 141 MMOL/L (ref 134–144)
TRIGL SERPL-MCNC: 85 MG/DL (ref 0–149)

## 2023-12-26 ENCOUNTER — TELEPHONE (OUTPATIENT)
Dept: FAMILY MEDICINE CLINIC | Facility: CLINIC | Age: 68
End: 2023-12-26

## 2023-12-27 NOTE — TELEPHONE ENCOUNTER
Old message, patient had labs done 12/23.   In the setting of GI bleed. Active bleeding since mid 01/2022 per patient. 2/2 Grade 3 inflamed hemorrhoid. Also with erosions on endoscopy.    Has received 6 U pRBCs since 1/23, Hb initially 5.8 now stable at ~8.0  - trend CBC qd for now, as Hb has been stable  - maintain active T+S, 2 large bore IVs  - c/w Protonix 40mg qd  - appreciate surgery, GI input   - hemorrhoidectomy today w/Dr. Quintero In the setting of GI bleed. Active bleeding since mid 01/2022 per patient. 2/2 Grade 3 inflamed hemorrhoid. Also with erosions on endoscopy.    Has received 6 U pRBCs since 1/23, Hb initially 5.8 now stable at ~8.0  - trend CBC qd for now, as Hb has been stable  - maintain active T+S, 2 large bore IVs  - c/w Protonix 40mg qd  - appreciate surgery, GI input   - hemorrhoidectomy today w/Dr. Quintero, f/u in 2 weeks  - refer to resident clinic for PCP

## 2023-12-27 NOTE — TELEPHONE ENCOUNTER
Spoke with PT. She said she missed a phone call from Jen regarding her labs. She said she already completed laBS since last week.    Thank You

## 2023-12-27 NOTE — TELEPHONE ENCOUNTER
I don't know if the Clinical and External team saw. That this message was sent on 12/23 and she already has the results to those orders, given that I sent it there directly the lab script. This was already completed. Please disregard, or ask patient if I am correct that this is an OLD message.

## 2023-12-29 ENCOUNTER — TELEPHONE (OUTPATIENT)
Age: 68
End: 2023-12-29

## 2024-01-09 ENCOUNTER — OFFICE VISIT (OUTPATIENT)
Dept: URGENT CARE | Facility: CLINIC | Age: 69
End: 2024-01-09
Payer: COMMERCIAL

## 2024-01-09 VITALS
RESPIRATION RATE: 18 BRPM | TEMPERATURE: 100 F | SYSTOLIC BLOOD PRESSURE: 120 MMHG | DIASTOLIC BLOOD PRESSURE: 80 MMHG | BODY MASS INDEX: 36.35 KG/M2 | HEIGHT: 62 IN | WEIGHT: 197.53 LBS | HEART RATE: 98 BPM | OXYGEN SATURATION: 95 %

## 2024-01-09 DIAGNOSIS — R05.1 ACUTE COUGH: Primary | ICD-10-CM

## 2024-01-09 DIAGNOSIS — I10 BENIGN ESSENTIAL HYPERTENSION: ICD-10-CM

## 2024-01-09 PROCEDURE — 99213 OFFICE O/P EST LOW 20 MIN: CPT | Performed by: PHYSICIAN ASSISTANT

## 2024-01-09 RX ORDER — BENZONATATE 100 MG/1
100 CAPSULE ORAL 3 TIMES DAILY PRN
Qty: 20 CAPSULE | Refills: 0 | Status: SHIPPED | OUTPATIENT
Start: 2024-01-09

## 2024-01-09 RX ORDER — BROMPHENIRAMINE MALEATE, PSEUDOEPHEDRINE HYDROCHLORIDE, AND DEXTROMETHORPHAN HYDROBROMIDE 2; 30; 10 MG/5ML; MG/5ML; MG/5ML
10 SYRUP ORAL 4 TIMES DAILY PRN
Qty: 120 ML | Refills: 0 | Status: SHIPPED | OUTPATIENT
Start: 2024-01-09

## 2024-01-09 RX ORDER — LISINOPRIL 40 MG/1
40 TABLET ORAL DAILY
Qty: 90 TABLET | Refills: 0 | Status: SHIPPED | OUTPATIENT
Start: 2024-01-09

## 2024-01-09 RX ORDER — ALBUTEROL SULFATE 90 UG/1
2 AEROSOL, METERED RESPIRATORY (INHALATION) EVERY 6 HOURS PRN
Qty: 8 G | Refills: 0 | Status: SHIPPED | OUTPATIENT
Start: 2024-01-09

## 2024-01-09 RX ORDER — DICLOFENAC SODIUM 75 MG/1
TABLET, DELAYED RELEASE ORAL 2 TIMES DAILY PRN
COMMUNITY
Start: 2023-11-10

## 2024-01-09 RX ORDER — HYDROXYCHLOROQUINE SULFATE 200 MG/1
TABLET, FILM COATED ORAL 2 TIMES DAILY WITH MEALS
COMMUNITY
Start: 2023-10-31

## 2024-01-09 NOTE — PATIENT INSTRUCTIONS
Acute Cough   WHAT YOU NEED TO KNOW:   An acute cough can last up to 3 weeks. Common causes of an acute cough include a cold, allergies, or a lung infection.  DISCHARGE INSTRUCTIONS:   Return to the emergency department if:   You have trouble breathing or feel short of breath.    You cough up blood, or you see blood in your mucus.    You faint or feel weak or dizzy.    You have chest pain when you cough or take a deep breath.    You have new wheezing.    Contact your healthcare provider if:   You have a fever.    Your cough lasts longer than 4 weeks.    Your symptoms do not improve with treatment.    You have questions or concerns about your condition or care.    Medicines:   Medicines  may be needed to stop the cough, decrease swelling in your airways, or help open your airways. Medicine may also be given to help you cough up mucus. Ask your healthcare provider what over-the-counter medicines you can take. If you have an infection caused by bacteria, you may need antibiotics.    Take your medicine as directed.  Contact your healthcare provider if you think your medicine is not helping or if you have side effects. Tell your provider if you are allergic to any medicine. Keep a list of the medicines, vitamins, and herbs you take. Include the amounts, and when and why you take them. Bring the list or the pill bottles to follow-up visits. Carry your medicine list with you in case of an emergency.    Manage your symptoms:   Do not smoke and stay away from others who smoke.  Nicotine and other chemicals in cigarettes and cigars can cause lung damage and make your cough worse. Ask your healthcare provider for information if you currently smoke and need help to quit. E-cigarettes or smokeless tobacco still contain nicotine. Talk to your healthcare provider before you use these products.    Drink extra liquids as directed.  Liquids will help thin and loosen mucus so you can cough it up. Liquids will also help prevent  dehydration. Examples of good liquids to drink include water, fruit juice, and broth. Do not drink liquids that contain caffeine. Caffeine can increase your risk for dehydration. Ask your healthcare provider how much liquid to drink each day.    Rest as directed.  Do not do activities that make your cough worse, such as exercise.    Use a humidifier or vaporizer.  Use a cool mist humidifier or a vaporizer to increase air moisture in your home. This may make it easier for you to breathe and help decrease your cough.    Eat 2 to 5 mL of honey 2 times each day.  Honey can help thin mucus and decrease your cough.    Use cough drops or lozenges.  These can help decrease throat irritation and your cough.    Follow up with your healthcare provider as directed:  Write down your questions so you remember to ask them during your visits.  © Copyright [employer] 2023 Information is for End User's use only and may not be sold, redistributed or otherwise used for commercial purposes.  The above information is an  only. It is not intended as medical advice for individual conditions or treatments. Talk to your doctor, nurse or pharmacist before following any medical regimen to see if it is safe and effective for you.

## 2024-01-09 NOTE — TELEPHONE ENCOUNTER
Reason for call:   [x] Refill   [] Prior Auth  [] Other:     Office:   [x] PCP/Provider - [facility]  [] Specialty/Provider -     Medication:   lisinopril (ZESTRIL) 40 mg tablet     Dose/Frequency: Take 1 tablet (40 mg total) by mouth daily     Quantity: 90    Pharmacy: [facility]     Does the patient have enough for 3 days?   [] Yes   [x] No - Send as HP to POD

## 2024-01-09 NOTE — PROGRESS NOTES
[facility] Now        NAME: Darlin Garcia is a 68 y.o. female  : 1955    MRN: [id number]  DATE: 2024  TIME: 10:43 AM    Assessment and Plan   Acute cough [R05.1]  1. Acute cough  brompheniramine-pseudoephedrine-DM 30-2-10 MG/5ML syrup    benzonatate (TESSALON PERLES) 100 mg capsule    albuterol (PROVENTIL HFA,VENTOLIN HFA) 90 mcg/act inhaler        Patient will call back if no improvement in the next 2 to 3 days.  If she is still having the same symptoms I will send in azithromycin for her.  We discussed that this is likely viral and an inhaler and cough medicine will help.      Patient Instructions     Patient Instructions   Acute Cough   WHAT YOU NEED TO KNOW:   An acute cough can last up to 3 weeks. Common causes of an acute cough include a cold, allergies, or a lung infection.  DISCHARGE INSTRUCTIONS:   Return to the emergency department if:   You have trouble breathing or feel short of breath.    You cough up blood, or you see blood in your mucus.    You faint or feel weak or dizzy.    You have chest pain when you cough or take a deep breath.    You have new wheezing.    Contact your healthcare provider if:   You have a fever.    Your cough lasts longer than 4 weeks.    Your symptoms do not improve with treatment.    You have questions or concerns about your condition or care.    Medicines:   Medicines  may be needed to stop the cough, decrease swelling in your airways, or help open your airways. Medicine may also be given to help you cough up mucus. Ask your healthcare provider what over-the-counter medicines you can take. If you have an infection caused by bacteria, you may need antibiotics.    Take your medicine as directed.  Contact your healthcare provider if you think your medicine is not helping or if you have side effects. Tell your provider if you are allergic to any medicine. Keep a list of the medicines, vitamins, and herbs you take. Include the amounts, and when and why  you take them. Bring the list or the pill bottles to follow-up visits. Carry your medicine list with you in case of an emergency.    Manage your symptoms:   Do not smoke and stay away from others who smoke.  Nicotine and other chemicals in cigarettes and cigars can cause lung damage and make your cough worse. Ask your healthcare provider for information if you currently smoke and need help to quit. E-cigarettes or smokeless tobacco still contain nicotine. Talk to your healthcare provider before you use these products.    Drink extra liquids as directed.  Liquids will help thin and loosen mucus so you can cough it up. Liquids will also help prevent dehydration. Examples of good liquids to drink include water, fruit juice, and broth. Do not drink liquids that contain caffeine. Caffeine can increase your risk for dehydration. Ask your healthcare provider how much liquid to drink each day.    Rest as directed.  Do not do activities that make your cough worse, such as exercise.    Use a humidifier or vaporizer.  Use a cool mist humidifier or a vaporizer to increase air moisture in your home. This may make it easier for you to breathe and help decrease your cough.    Eat 2 to 5 mL of honey 2 times each day.  Honey can help thin mucus and decrease your cough.    Use cough drops or lozenges.  These can help decrease throat irritation and your cough.    Follow up with your healthcare provider as directed:  Write down your questions so you remember to ask them during your visits.  © Copyright [employer] 2023 Information is for End User's use only and may not be sold, redistributed or otherwise used for commercial purposes.  The above information is an  only. It is not intended as medical advice for individual conditions or treatments. Talk to your doctor, nurse or pharmacist before following any medical regimen to see if it is safe and effective for you.        Follow up with PCP in 3-5 days.  Proceed to  ER if  symptoms worsen.    Chief Complaint     Chief Complaint   Patient presents with    Cold Like Symptoms    Cough     X 5 days - started with L ear pain and scratchy throat - resolved. Now has congested cough with chest tightness and rhinorrhea. No HA. Had negative COVID test yesterday. Taking Delsym and OTC nasal spray.          History of Present Illness       The patient is a 68-year-old female presenting today for a congested cough, chest tightness and rhinorrhea x 5 days.  Tested negative for COVID at home yesterday which was negative.  Has been taking Delsym and over-the-counter nasal spray.  Reports initially started with left ear pain and a sore throat which resolved. She lvies alone and has no sick contacts at work. Has tried OTC meds. No known fevers. Has not taken her temp at home. No SOB, nausea or vomiting.         Review of Systems   Review of Systems   Constitutional:  Negative for activity change, appetite change, chills, fatigue and fever.   HENT:  Positive for congestion and rhinorrhea. Negative for ear pain, sinus pressure, sinus pain and sore throat.    Eyes:  Negative for pain and visual disturbance.   Respiratory:  Positive for cough (productive) and chest tightness. Negative for shortness of breath.    Cardiovascular:  Positive for chest pain (with cough). Negative for palpitations.   Gastrointestinal:  Negative for abdominal pain, diarrhea, nausea and vomiting.   Genitourinary:  Negative for dysuria and hematuria.   Musculoskeletal:  Negative for arthralgias, back pain and myalgias.   Skin:  Negative for color change, pallor and rash.   Neurological:  Negative for seizures, syncope and headaches.   All other systems reviewed and are negative.        Current Medications       Current Outpatient Medications:     albuterol (PROVENTIL HFA,VENTOLIN HFA) 90 mcg/act inhaler, Inhale 2 puffs every 6 (six) hours as needed for wheezing, Disp: 8 g, Rfl: 0    aspirin (ECOTRIN LOW STRENGTH) 81 mg EC tablet,  Take 81 mg by mouth daily, Disp: , Rfl:     atorvastatin (LIPITOR) 20 mg tablet, TAKE 1 TABLET BY MOUTH EVERY DAY WITH DINNER (Patient taking differently: 40 mg), Disp: 90 tablet, Rfl: 2    benzonatate (TESSALON PERLES) 100 mg capsule, Take 1 capsule (100 mg total) by mouth 3 (three) times a day as needed for cough, Disp: 20 capsule, Rfl: 0    brompheniramine-pseudoephedrine-DM 30-2-10 MG/5ML syrup, Take 10 mL by mouth 4 (four) times a day as needed for allergies, congestion or cough, Disp: 120 mL, Rfl: 0    Cholecalciferol (VITAMIN D3) 3000 units TABS, Take 1,000 Units by mouth 2 (two) times a day  , Disp: , Rfl:     Coenzyme Q10 (COQ10) 200 MG CAPS, Take 1 capsule by mouth daily, Disp: , Rfl:     ezetimibe (ZETIA) 10 mg tablet, TAKE 1 TABLET BY MOUTH EVERY DAY, Disp: 90 tablet, Rfl: 0    hydroxychloroquine (PLAQUENIL) 200 mg tablet, 2 (two) times a day with meals, Disp: , Rfl:     lisinopril (ZESTRIL) 40 mg tablet, Take 1 tablet (40 mg total) by mouth daily, Disp: 90 tablet, Rfl: 0    metFORMIN (GLUCOPHAGE) 500 mg tablet, TAKE 1 TABLET BY MOUTH TWICE A DAY, Disp: 180 tablet, Rfl: 0    metoprolol tartrate (LOPRESSOR) 25 mg tablet, TAKE 0.5 TABLETS (12.5 MG TOTAL) BY MOUTH EVERY 12 (TWELVE) HOURS, Disp: 90 tablet, Rfl: 1    Omega-3 Fatty Acids (fish oil) 1,000 mg, Take 2 capsules (2,000 mg total) by mouth 2 (two) times a day, Disp: 120 capsule, Rfl: 0    Semaglutide (OZEMPIC, 0.25 OR 0.5 MG/DOSE, SC), Inject under the skin once a week, Disp: , Rfl:     diclofenac (VOLTAREN) 75 mg EC tablet, 2 (two) times a day as needed (Patient not taking: Reported on 1/9/2024), Disp: , Rfl:     Current Allergies     Allergies as of 01/09/2024 - Reviewed 01/09/2024   Allergen Reaction Noted    Other  01/25/2005    Penicillins Rash 02/16/2004            The following portions of the patient's history were reviewed and updated as appropriate: allergies, current medications, past family history, past medical history, past social  "history, past surgical history and problem list.     Past Medical History:   Diagnosis Date    Arthritis     Arthropathy     ONSET: 09FEB2011    Cellulitis     ONSET: [id number]    Colon polyp     Costochondritis     ONSET: 23MAY2005    Dermatitis     ONSET: 25JAN2005    Diabetes mellitus (HCC)     borderline    Hemorrhagic detachment of retinal pigment epithelium     ONSET: [id number]    Hx of vertigo     one year ago 2020-treated-no further episodes    Hypertension     LAST ASSESSED: 02JUN2014    Ingrown nail     LAST ASSESSED: 04MAY2010    Labyrinthitis     ONSET: 30NOV2007    Lymphadenopathy     ONSET: 10OCT2007    Myalgia     ONSET: 23MAY2005    Myositis     ONSET: 23MAY2005    Nonallopathic lesion     ONSET: [id number]    Shortness of breath     ONSET: 30SEP2008-with exertion-had cardiac workup with stress test-was \"good\" per patient  2/23/21  DS    Systemic lupus erythematosus (HCC)     LAST ASSESSED: 02JUN2014    Tinea corporis     ONSET: 22JUL2011    Vertigo     LAST ASSESSED: 15FEB2013       Past Surgical History:   Procedure Laterality Date    COLONOSCOPY      EGD      FINGER SURGERY      left index       Family History   Problem Relation Age of Onset    Stomach cancer Mother         MALIGNANT NEOPLASM    Hypertension Father     Coronary artery disease Sister     Diabetes Sister     No Known Problems Daughter     Coronary artery disease Brother     Other Brother         CARDIAC PACEMAKER, CARDIOMEGALY    No Known Problems Brother          Medications have been verified.        Objective   /80   Pulse 98   Temp 100 °F (37.8 °C)   Resp 18   Ht 5' 2\" (1.575 m)   Wt 89.6 kg (197 lb 8.5 oz)   SpO2 95%   BMI 36.13 kg/m²        Physical Exam     Physical Exam  Vitals and nursing note reviewed.   Constitutional:       General: She is not in acute distress.     Appearance: Normal appearance. She is well-developed and normal weight. She is not ill-appearing, toxic-appearing or diaphoretic.   HENT:      " Head: Normocephalic and atraumatic.      Right Ear: Tympanic membrane, ear canal and external ear normal. No drainage, swelling or tenderness. No middle ear effusion. There is no impacted cerumen. Tympanic membrane is not erythematous.      Left Ear: Tympanic membrane, ear canal and external ear normal. No drainage, swelling or tenderness.  No middle ear effusion. There is no impacted cerumen. Tympanic membrane is not erythematous.      Nose: Nose normal. No congestion or rhinorrhea.      Mouth/Throat:      Mouth: Mucous membranes are moist. No oral lesions.      Pharynx: Oropharynx is clear. Uvula midline. Posterior oropharyngeal erythema present. No pharyngeal swelling, oropharyngeal exudate or uvula swelling.      Tonsils: No tonsillar exudate or tonsillar abscesses. 0 on the right. 0 on the left.   Eyes:      Extraocular Movements: Extraocular movements intact.      Right eye: Normal extraocular motion.      Left eye: Normal extraocular motion.      Conjunctiva/sclera: Conjunctivae normal.      Pupils: Pupils are equal, round, and reactive to light.   Cardiovascular:      Rate and Rhythm: Normal rate and regular rhythm.      Heart sounds: Normal heart sounds. No murmur heard.     No friction rub. No gallop.   Pulmonary:      Effort: Pulmonary effort is normal. No respiratory distress.      Breath sounds: Normal breath sounds. No stridor. No wheezing, rhonchi or rales.   Chest:      Chest wall: No tenderness.   Musculoskeletal:         General: Normal range of motion.   Skin:     General: Skin is warm and dry.      Capillary Refill: Capillary refill takes less than 2 seconds.   Neurological:      Mental Status: She is alert.

## 2024-02-07 ENCOUNTER — OFFICE VISIT (OUTPATIENT)
Dept: URGENT CARE | Facility: CLINIC | Age: 69
End: 2024-02-07
Payer: COMMERCIAL

## 2024-02-07 VITALS
OXYGEN SATURATION: 98 % | HEART RATE: 72 BPM | HEIGHT: 62 IN | DIASTOLIC BLOOD PRESSURE: 80 MMHG | RESPIRATION RATE: 18 BRPM | TEMPERATURE: 98 F | WEIGHT: 198 LBS | BODY MASS INDEX: 36.44 KG/M2 | SYSTOLIC BLOOD PRESSURE: 150 MMHG

## 2024-02-07 DIAGNOSIS — J40 BRONCHITIS: Primary | ICD-10-CM

## 2024-02-07 PROCEDURE — 99213 OFFICE O/P EST LOW 20 MIN: CPT | Performed by: PHYSICIAN ASSISTANT

## 2024-02-07 RX ORDER — ALBUTEROL SULFATE 90 UG/1
2 AEROSOL, METERED RESPIRATORY (INHALATION) EVERY 4 HOURS PRN
Qty: 6.7 G | Refills: 0 | Status: SHIPPED | OUTPATIENT
Start: 2024-02-07

## 2024-02-07 RX ORDER — SODIUM CHLORIDE FOR INHALATION 0.9 %
3 VIAL, NEBULIZER (ML) INHALATION ONCE
Status: COMPLETED | OUTPATIENT
Start: 2024-02-07 | End: 2024-02-07

## 2024-02-07 RX ORDER — AZITHROMYCIN 250 MG/1
TABLET, FILM COATED ORAL
Qty: 6 TABLET | Refills: 0 | Status: SHIPPED | OUTPATIENT
Start: 2024-02-07 | End: 2024-02-11

## 2024-02-07 RX ORDER — PREDNISONE 20 MG/1
20 TABLET ORAL DAILY
Qty: 4 TABLET | Refills: 0 | Status: SHIPPED | OUTPATIENT
Start: 2024-02-07 | End: 2024-02-11

## 2024-02-07 RX ORDER — IPRATROPIUM BROMIDE AND ALBUTEROL SULFATE 2.5; .5 MG/3ML; MG/3ML
3 SOLUTION RESPIRATORY (INHALATION) ONCE
Status: COMPLETED | OUTPATIENT
Start: 2024-02-07 | End: 2024-02-07

## 2024-02-07 RX ADMIN — IPRATROPIUM BROMIDE AND ALBUTEROL SULFATE 3 ML: 2.5; .5 SOLUTION RESPIRATORY (INHALATION) at 18:30

## 2024-02-07 RX ADMIN — Medication 3 ML: at 18:30

## 2024-02-12 NOTE — TELEPHONE ENCOUNTER
Reason for call:   [x] Refill   [] Prior Auth  [] Other:     Office: yesi russell   [x] PCP/Provider - sabates   [] Specialty/Provider -     Medication: ozempic     Dose/Frequency: 0.25 or 0.5 mg/ dose/ Inject once weekly     Quantity: 30 day supply     Pharmacy: [facility]     Does the patient have enough for 3 days?   [x] Yes   [] No - Send as HP to POD

## 2024-02-21 PROBLEM — Z12.39 BREAST CANCER SCREENING: Status: RESOLVED | Noted: 2018-08-15 | Resolved: 2024-02-21

## 2024-02-26 DIAGNOSIS — E11.9 TYPE 2 DIABETES MELLITUS WITHOUT COMPLICATION, WITHOUT LONG-TERM CURRENT USE OF INSULIN (HCC): Primary | ICD-10-CM

## 2024-02-27 DIAGNOSIS — E78.5 HYPERLIPIDEMIA, UNSPECIFIED HYPERLIPIDEMIA TYPE: ICD-10-CM

## 2024-02-27 RX ORDER — ATORVASTATIN CALCIUM 20 MG/1
TABLET, FILM COATED ORAL
Qty: 90 TABLET | Refills: 1 | Status: SHIPPED | OUTPATIENT
Start: 2024-02-27

## 2024-04-05 DIAGNOSIS — I10 BENIGN ESSENTIAL HYPERTENSION: ICD-10-CM

## 2024-04-05 RX ORDER — LISINOPRIL 40 MG/1
40 TABLET ORAL DAILY
Qty: 90 TABLET | Refills: 0 | Status: SHIPPED | OUTPATIENT
Start: 2024-04-05

## 2024-05-25 DIAGNOSIS — E11.9 TYPE 2 DIABETES MELLITUS WITHOUT COMPLICATION, WITHOUT LONG-TERM CURRENT USE OF INSULIN (HCC): ICD-10-CM

## 2024-05-28 ENCOUNTER — TELEPHONE (OUTPATIENT)
Age: 69
End: 2024-05-28

## 2024-05-28 DIAGNOSIS — I10 BENIGN ESSENTIAL HYPERTENSION: ICD-10-CM

## 2024-05-28 NOTE — TELEPHONE ENCOUNTER
Pt is going out of town 06/27/ to 07/10  and needs an appointment before for  weight check and for medication  . Please f/u pt also needs letter because [place] to get through [employer]  Thank you .      Medication Refill Request     Name lisinopril (ZESTRIL) 40 mg tablet    Dose/Frequency  TAKE 1 TABLET BY MOUTH EVERY DAY   Quantity 90  Verified pharmacy   [x]  Verified ordering Provider   [x]  Does patient have enough for the next 3 days? Yes [] No [x]

## 2024-05-31 RX ORDER — LISINOPRIL 40 MG/1
40 TABLET ORAL DAILY
Qty: 90 TABLET | Refills: 0 | Status: SHIPPED | OUTPATIENT
Start: 2024-05-31 | End: 2024-06-06 | Stop reason: SDUPTHER

## 2024-06-06 ENCOUNTER — OFFICE VISIT (OUTPATIENT)
Dept: FAMILY MEDICINE CLINIC | Facility: CLINIC | Age: 69
End: 2024-06-06
Payer: COMMERCIAL

## 2024-06-06 VITALS
HEIGHT: 62 IN | SYSTOLIC BLOOD PRESSURE: 130 MMHG | DIASTOLIC BLOOD PRESSURE: 60 MMHG | BODY MASS INDEX: 37.36 KG/M2 | HEART RATE: 80 BPM | WEIGHT: 203 LBS | OXYGEN SATURATION: 98 % | TEMPERATURE: 98 F | RESPIRATION RATE: 18 BRPM

## 2024-06-06 DIAGNOSIS — Z12.31 ENCOUNTER FOR SCREENING MAMMOGRAM FOR BREAST CANCER: ICD-10-CM

## 2024-06-06 DIAGNOSIS — E11.00 TYPE 2 DIABETES MELLITUS WITH HYPEROSMOLARITY WITHOUT COMA, WITHOUT LONG-TERM CURRENT USE OF INSULIN (HCC): Primary | ICD-10-CM

## 2024-06-06 DIAGNOSIS — Z11.59 NEED FOR HEPATITIS C SCREENING TEST: ICD-10-CM

## 2024-06-06 DIAGNOSIS — E78.5 HYPERLIPIDEMIA, UNSPECIFIED HYPERLIPIDEMIA TYPE: ICD-10-CM

## 2024-06-06 DIAGNOSIS — I10 BENIGN ESSENTIAL HYPERTENSION: ICD-10-CM

## 2024-06-06 LAB — SL AMB POCT HEMOGLOBIN AIC: 5.7 (ref ?–6.5)

## 2024-06-06 PROCEDURE — 99214 OFFICE O/P EST MOD 30 MIN: CPT | Performed by: FAMILY MEDICINE

## 2024-06-06 PROCEDURE — 83036 HEMOGLOBIN GLYCOSYLATED A1C: CPT | Performed by: FAMILY MEDICINE

## 2024-06-06 RX ORDER — LISINOPRIL 40 MG/1
40 TABLET ORAL DAILY
Qty: 90 TABLET | Refills: 2 | Status: SHIPPED | OUTPATIENT
Start: 2024-06-06

## 2024-06-06 RX ORDER — LORAZEPAM 1 MG/1
TABLET ORAL
COMMUNITY
Start: 2024-05-23

## 2024-06-06 NOTE — PROGRESS NOTES
Darlin Garcia 1955 female MRN: [id number]    [facility] OFFICE VISIT  [facility]      ASSESSMENT/PLAN  Darlin Garcia is a 68 y.o. female presents to the office for    Diagnoses and all orders for this visit:    Type 2 diabetes mellitus with hyperosmolarity without coma, without long-term current use of insulin (HCC)  -     POCT hemoglobin A1c  -     Hemoglobin A1C; Future  -     UA w Reflex to Microscopic w Reflex to Culture; Future  -     Hemoglobin A1C  -     Ambulatory Referral to Podiatry; Future  -     Albumin / creatinine urine ratio; Future  -     Albumin / creatinine urine ratio    Benign essential hypertension  -     lisinopril (ZESTRIL) 40 mg tablet; Take 1 tablet (40 mg total) by mouth daily  -     CBC and differential; Future  -     Comprehensive metabolic panel; Future  -     UA w Reflex to Microscopic w Reflex to Culture; Future  -     CBC and differential  -     Comprehensive metabolic panel    Encounter for screening mammogram for breast cancer  -     Mammo screening bilateral w 3d & cad; Future    Hyperlipidemia, unspecified hyperlipidemia type  -     Lipid panel; Future  -     Hemoglobin A1C; Future  -     Lipid panel  -     Hemoglobin A1C    Need for hepatitis C screening test  -     Hepatitis C Antibody; Future  -     Hepatitis C Antibody    Other orders  -     LORazepam (ATIVAN) 1 mg tablet; TAKE 1 TABLET BY MOUTH ONE HOUR PRIOR TO APPOINTMENT AFTER CHECK IN MY REPEAT ONE TIME       Type 2 diabetes has been doing very well on Ozempic 0.5 mg daily.  At this time make no changes except stopping metformin.  Hypertension very well-controlled continue metoprolol/lisinopril.  History of hyperlipidemia being controlled on the medications repeat lipid panel.  Screening mammogram sent for patient    Return to the office in 6 months chronic condition follow-up.  Patient has been tolerating Ozempic without any side effects.  Patient states that  her cholesterol medication has not given her any muscle cramps.           Future Appointments   Date Time Provider Department Center   7/26/2024  1:00 PM AN MAMMO 1 AN Mammo [facility]   9/6/2024  2:00 PM Carmina Reyes MD  [facility]          SUBJECTIVE  CC: Weight Check (Discuss medication)      HPI:  Darlin Garcia is a 68 y.o. female who presents for an chronic follow-up.  Patient states that her diabetes medication has been okay has not had any side effects of hypoglycemic events.  Denies any constipation or dizziness.  Taking her cholesterol medications without any muscle cramps.  In need of her mammogram prescription.  Denies any other acute concerns today      Review of Systems   Constitutional:  Negative for activity change, appetite change, chills, fatigue and fever.   HENT:  Negative for congestion.    Respiratory:  Negative for cough, chest tightness and shortness of breath.    Cardiovascular:  Negative for chest pain and leg swelling.   Gastrointestinal:  Negative for abdominal distention, abdominal pain, constipation, diarrhea, nausea and vomiting.   All other systems reviewed and are negative.      Historical Information   The patient history was reviewed as follows:  Past Medical History:   Diagnosis Date   • Arthritis    • Arthropathy     ONSET: 09FEB2011   • Cellulitis     ONSET: 19AUG2009   • Colon polyp    • Costochondritis     ONSET: 23MAY2005   • Dermatitis     ONSET: 25JAN2005   • Diabetes mellitus (HCC)     borderline   • Hemorrhagic detachment of retinal pigment epithelium     ONSET: 10FEB2009   • Hx of vertigo     one year ago 2020-treated-no further episodes   • Hypertension     LAST ASSESSED: 02JUN2014   • Ingrown nail     LAST ASSESSED: 04MAY2010   • Labyrinthitis     ONSET: 30NOV2007   • Lymphadenopathy     ONSET: 10OCT2007   • Myalgia     ONSET: 23MAY2005   • Myositis     ONSET: 23MAY2005   • Nonallopathic lesion     ONSET: 07JUL2009   • Shortness of breath      "ONSET: 30SEP2008-with exertion-had cardiac workup with stress test-was \"good\" per patient  2/23/21  DS   • Systemic lupus erythematosus (HCC)     LAST ASSESSED: 02JUN2014   • Tinea corporis     ONSET: [id number]   • Vertigo     LAST ASSESSED: 15FEB2013         Medications:     Current Outpatient Medications:   •  aspirin (ECOTRIN LOW STRENGTH) 81 mg EC tablet, Take 81 mg by mouth daily, Disp: , Rfl:   •  atorvastatin (LIPITOR) 20 mg tablet, TAKE 1 TABLET BY MOUTH EVERY DAY WITH DINNER, Disp: 90 tablet, Rfl: 1  •  Cholecalciferol (VITAMIN D3) 3000 units TABS, Take 1,000 Units by mouth 2 (two) times a day  , Disp: , Rfl:   •  Coenzyme Q10 (COQ10) 200 MG CAPS, Take 1 capsule by mouth daily, Disp: , Rfl:   •  ezetimibe (ZETIA) 10 mg tablet, TAKE 1 TABLET BY MOUTH EVERY DAY, Disp: 90 tablet, Rfl: 0  •  hydroxychloroquine (PLAQUENIL) 200 mg tablet, 2 (two) times a day with meals, Disp: , Rfl:   •  lisinopril (ZESTRIL) 40 mg tablet, Take 1 tablet (40 mg total) by mouth daily, Disp: 90 tablet, Rfl: 2  •  LORazepam (ATIVAN) 1 mg tablet, TAKE 1 TABLET BY MOUTH ONE HOUR PRIOR TO APPOINTMENT AFTER CHECK IN MY REPEAT ONE TIME, Disp: , Rfl:   •  metoprolol tartrate (LOPRESSOR) 25 mg tablet, TAKE 0.5 TABLETS (12.5 MG TOTAL) BY MOUTH EVERY 12 (TWELVE) HOURS, Disp: 90 tablet, Rfl: 1  •  Omega-3 Fatty Acids (fish oil) 1,000 mg, Take 2 capsules (2,000 mg total) by mouth 2 (two) times a day, Disp: 120 capsule, Rfl: 0  •  semaglutide, 0.25 or 0.5 mg/dose, (Ozempic, 0.25 or 0.5 MG/DOSE,) 2 mg/3 mL injection pen, Inject 0.75 mL (0.5 mg total) under the skin every 7 days (Patient taking differently: Inject 0.25 mg under the skin every 7 days), Disp: 3 mL, Rfl: 0  •  albuterol (PROVENTIL HFA,VENTOLIN HFA) 90 mcg/act inhaler, Inhale 2 puffs every 6 (six) hours as needed for wheezing (Patient not taking: Reported on 2/7/2024), Disp: 8 g, Rfl: 0    Allergies   Allergen Reactions   • Other      Reaction Date: 25Jan2005; Annotation - [id number]: " "rash...madlpn adhesive tape   • Penicillins Rash     Reaction Date: 16Feb2004; Annotation - [id number]: jjw       OBJECTIVE  Vitals:   Vitals:    06/06/24 1539   BP: 130/60   BP Location: Left arm   Patient Position: Sitting   Cuff Size: Large   Pulse: 80   Resp: 18   Temp: 98 °F (36.7 °C)   TempSrc: Temporal   SpO2: 98%   Weight: 92.1 kg (203 lb)   Height: 5' 2\" (1.575 m)         Physical Exam  Vitals reviewed.   Constitutional:       Appearance: She is well-developed.   HENT:      Head: Normocephalic and atraumatic.   Eyes:      Extraocular Movements: EOM normal.      Conjunctiva/sclera: Conjunctivae normal.      Pupils: Pupils are equal, round, and reactive to light.   Cardiovascular:      Rate and Rhythm: Normal rate and regular rhythm.      Pulses: no weak pulses.           Dorsalis pedis pulses are 2+ on the right side and 2+ on the left side.        Posterior tibial pulses are 2+ on the right side and 2+ on the left side.      Heart sounds: Normal heart sounds, S1 normal and S2 normal. No murmur heard.  Pulmonary:      Effort: Pulmonary effort is normal. No respiratory distress.      Breath sounds: Normal breath sounds. No wheezing.   Musculoskeletal:         General: No edema. Normal range of motion.      Cervical back: Normal range of motion and neck supple.        Feet:    Feet:      Right foot:      Skin integrity: Callus present. No ulcer, skin breakdown, erythema, warmth or dry skin.      Left foot:      Skin integrity: Callus present. No ulcer, skin breakdown, erythema, warmth or dry skin.   Skin:     General: Skin is warm.   Neurological:      Mental Status: She is alert and oriented to person, place, and time.   Psychiatric:         Mood and Affect: Mood and affect normal.         Speech: Speech normal.         Behavior: Behavior normal.         Thought Content: Thought content normal.         Judgment: Judgment normal.            Patient's shoes and socks removed.    Right Foot/Ankle   Right Foot " Inspection  Skin Exam: skin normal, skin intact, callus and callus. No dry skin, no warmth, no erythema, no maceration, no abnormal color, no pre-ulcer and no ulcer.     Toe Exam: ROM and strength within normal limits. No swelling    Sensory   Vibration: intact  Proprioception: intact  Monofilament testing: intact    Vascular  Capillary refills: < 3 seconds  The right DP pulse is 2+. The right PT pulse is 2+.     Left Foot/Ankle  Left Foot Inspection  Skin Exam: skin normal, skin intact and callus. No dry skin, no warmth, no erythema, no maceration, normal color, no pre-ulcer and no ulcer.     Toe Exam: ROM and strength within normal limits. No swelling.     Sensory   Vibration: intact  Proprioception: intact  Monofilament testing: intact    Vascular  Capillary refills: < 3 seconds  The left DP pulse is 2+. The left PT pulse is 2+.     Assign Risk Category  No deformity present  No loss of protective sensation  No weak pulses  Risk: 0               Carmina Joshua MD,   [facility]  6/10/2024

## 2024-06-29 DIAGNOSIS — E11.9 TYPE 2 DIABETES MELLITUS WITHOUT COMPLICATION, WITHOUT LONG-TERM CURRENT USE OF INSULIN (HCC): ICD-10-CM

## 2024-06-29 RX ORDER — SEMAGLUTIDE 0.68 MG/ML
INJECTION, SOLUTION SUBCUTANEOUS
Qty: 3 ML | Refills: 1 | Status: SHIPPED | OUTPATIENT
Start: 2024-06-29

## 2024-07-23 ENCOUNTER — OFFICE VISIT (OUTPATIENT)
Dept: PODIATRY | Facility: CLINIC | Age: 69
End: 2024-07-23
Payer: COMMERCIAL

## 2024-07-23 VITALS
DIASTOLIC BLOOD PRESSURE: 90 MMHG | BODY MASS INDEX: 36.44 KG/M2 | OXYGEN SATURATION: 97 % | WEIGHT: 198 LBS | SYSTOLIC BLOOD PRESSURE: 136 MMHG | HEART RATE: 78 BPM | HEIGHT: 62 IN

## 2024-07-23 DIAGNOSIS — L60.0 INGROWN NAIL OF GREAT TOE OF RIGHT FOOT: ICD-10-CM

## 2024-07-23 DIAGNOSIS — B07.0 PLANTAR WART: Primary | ICD-10-CM

## 2024-07-23 DIAGNOSIS — E11.00 TYPE 2 DIABETES MELLITUS WITH HYPEROSMOLARITY WITHOUT COMA, WITHOUT LONG-TERM CURRENT USE OF INSULIN (HCC): ICD-10-CM

## 2024-07-23 PROCEDURE — 17110 DESTRUCTION B9 LES UP TO 14: CPT | Performed by: PODIATRIST

## 2024-07-23 PROCEDURE — 99203 OFFICE O/P NEW LOW 30 MIN: CPT | Performed by: PODIATRIST

## 2024-07-23 NOTE — PROGRESS NOTES
Assessment/Plan:     The patient's clinical examination today significant for mild tenderness palpation to the distal lateral border of the right hallux nail plate where there is an incurvated spicule of nail noted.  There are no signs of infection. Verrucous lesions are noted to the distal lateral border of the right great toenail, the plantar right forefoot, and the plantar left heel.  There is loss of skin lines with tenderness to palpation lateral squeeze to all of these lesions.    The offending nail border was resected with a procedure without incident.  The verrucous lesions were debrided to pinpoint bleeding utilizing a sterile #15 blade.  They were then chemically ablated with application of Cantharone.  Dry dressings were applied over top of each lesion and are to be maintained for the next 48 hours.    Recommend follow-up in 3 to 4 weeks for follow-up of the verrucous lesions bilaterally.     Diagnoses and all orders for this visit:    Plantar wart    Type 2 diabetes mellitus with hyperosmolarity without coma, without long-term current use of insulin ([facility])  -     Ambulatory Referral to Podiatry    Ingrown nail of great toe of right foot    Other orders  -     Lesion Destruction          Subjective:     Patient ID: Darlin Garcia is a 68 y.o. female.    The patient presents today for initial consultation with [facility] podiatry with a chief complaint of bilateral plantar warts.  She states that the lesion to the plantar aspect of her right foot has been present for about a year.  She does note a tender ingrown to the lateral border of the right great toenail.  The wart to the plantar aspect of her left heel has been present for greater than 4 years.  She did note that another local podiatrist tried to cut out the lesion underneath her right forefoot but did not actively treat the lesion on her left heel.  She also notes a history of prior matricectomy of the right great toenail medial border and a small  "spicule of nail has grown back however that does not cause her any pain or discomfort.      PAST MEDICAL HISTORY:  Past Medical History:   Diagnosis Date    Arthritis     Arthropathy     ONSET: 09FEB2011    Cellulitis     ONSET: 19AUG2009    Colon polyp     Costochondritis     ONSET: 23MAY2005    Dermatitis     ONSET: 25JAN2005    Diabetes mellitus (HCC)     borderline    Hemorrhagic detachment of retinal pigment epithelium     ONSET: 10FEB2009    Hx of vertigo     one year ago 2020-treated-no further episodes    Hypertension     LAST ASSESSED: 02JUN2014    Ingrown nail     LAST ASSESSED: 04MAY2010    Labyrinthitis     ONSET: 30NOV2007    Lymphadenopathy     ONSET: 10OCT2007    Myalgia     ONSET: 23MAY2005    Myositis     ONSET: 23MAY2005    Nonallopathic lesion     ONSET: [id number]    Shortness of breath     ONSET: 30SEP2008-with exertion-had cardiac workup with stress test-was \"good\" per patient  2/23/21  DS    Systemic lupus erythematosus (HCC)     LAST ASSESSED: 02JUN2014    Tinea corporis     ONSET: 22JUL2011    Vertigo     LAST ASSESSED: 15FEB2013       PAST SURGICAL HISTORY:  Past Surgical History:   Procedure Laterality Date    COLONOSCOPY      EGD      FINGER SURGERY      left index        ALLERGIES:  Other and Penicillins    MEDICATIONS:  Current Outpatient Medications   Medication Sig Dispense Refill    aspirin (ECOTRIN LOW STRENGTH) 81 mg EC tablet Take 81 mg by mouth daily      atorvastatin (LIPITOR) 20 mg tablet TAKE 1 TABLET BY MOUTH EVERY DAY WITH DINNER 90 tablet 1    Cholecalciferol (VITAMIN D3) 3000 units TABS Take 1,000 Units by mouth 2 (two) times a day        Coenzyme Q10 (COQ10) 200 MG CAPS Take 1 capsule by mouth daily      ezetimibe (ZETIA) 10 mg tablet TAKE 1 TABLET BY MOUTH EVERY DAY 90 tablet 0    hydroxychloroquine (PLAQUENIL) 200 mg tablet 2 (two) times a day with meals      lisinopril (ZESTRIL) 40 mg tablet Take 1 tablet (40 mg total) by mouth daily 90 tablet 2    LORazepam (ATIVAN) 1 " mg tablet TAKE 1 TABLET BY MOUTH ONE HOUR PRIOR TO APPOINTMENT AFTER CHECK IN MY REPEAT ONE TIME      metoprolol tartrate (LOPRESSOR) 25 mg tablet TAKE 0.5 TABLETS (12.5 MG TOTAL) BY MOUTH EVERY 12 (TWELVE) HOURS 90 tablet 1    Omega-3 Fatty Acids (fish oil) 1,000 mg Take 2 capsules (2,000 mg total) by mouth 2 (two) times a day 120 capsule 0    semaglutide, 0.25 or 0.5 mg/dose, (Ozempic, 0.25 or 0.5 MG/DOSE,) 2 mg/3 mL injection pen INJECT 0.75 ML (0.5 MG TOTAL) UNDER THE SKIN EVERY 7 DAYS 3 mL 1    albuterol (PROVENTIL HFA,VENTOLIN HFA) 90 mcg/act inhaler Inhale 2 puffs every 6 (six) hours as needed for wheezing (Patient not taking: Reported on 2/7/2024) 8 g 0     No current facility-administered medications for this visit.       SOCIAL HISTORY:  Social History     Socioeconomic History    Marital status: Single     Spouse name: None    Number of children: None    Years of education: None    Highest education level: None   Occupational History    None   Tobacco Use    Smoking status: Never    Smokeless tobacco: Never   Vaping Use    Vaping status: Never Used   Substance and Sexual Activity    Alcohol use: Yes     Comment: rare    Drug use: No    Sexual activity: Not Currently   Other Topics Concern    None   Social History Narrative    DAILY COLA CONSUMPTION (1 CANS/DAY)     Social Determinants of Health     Financial Resource Strain: Not on file   Food Insecurity: Not on file   Transportation Needs: Not on file   Physical Activity: Not on file   Stress: Not on file   Social Connections: Not on file   Intimate Partner Violence: Not on file   Housing Stability: Not on file        Review of Systems   Constitutional: Negative.    HENT: Negative.     Eyes: Negative.    Respiratory: Negative.     Cardiovascular: Negative.    Endocrine: Negative.    Musculoskeletal: Negative.    Neurological: Negative.    Hematological: Negative.    Psychiatric/Behavioral: Negative.           Objective:     Physical Exam  Constitutional:  "      Appearance: Normal appearance.   HENT:      Head: Normocephalic and atraumatic.      Nose: Nose normal.   Cardiovascular:      Pulses:           Dorsalis pedis pulses are 2+ on the right side and 2+ on the left side.        Posterior tibial pulses are 2+ on the right side and 2+ on the left side.   Pulmonary:      Effort: Pulmonary effort is normal.   Musculoskeletal:        Feet:    Feet:      Right foot:      Toenail Condition: Right toenails are ingrown.      Comments: The patient's clinical examination today significant for mild tenderness palpation to the distal lateral border of the right hallux nail plate where there is an incurvated spicule of nail noted.  There are no signs of infection. Verrucous lesions are noted to the distal lateral border of the right great toenail, the plantar right forefoot, and the plantar left heel.  There is loss of skin lines with tenderness to palpation lateral squeeze to all of these lesions.    Skin:     General: Skin is warm.      Capillary Refill: Capillary refill takes less than 2 seconds.   Neurological:      General: No focal deficit present.      Mental Status: She is alert and oriented to person, place, and time.   Psychiatric:         Mood and Affect: Mood normal.         Behavior: Behavior normal.         Thought Content: Thought content normal.         Lesion Destruction    Date/Time: 7/23/2024 9:00 AM    Performed by: Gideon Maldonado DPM  Authorized by: Gideon Maldonado DPM  Universal Protocol:  Consent: Verbal consent obtained.  Risks and benefits: risks, benefits and alternatives were discussed  Consent given by: patient  Time out: Immediately prior to procedure a \"time out\" was called to verify the correct patient, procedure, equipment, support staff and site/side marked as required.  Timeout called at: 7/23/2024 9:00 AM.  Patient understanding: patient states understanding of the procedure being performed  Patient identity confirmed: verbally with " patient and provided demographic data    Procedure Details - Lesion Destruction:     Number of Lesions:  3  Lesion 1:     Body area:  Lower extremity    Lower extremity location:  R big toe    Malignancy: benign lesion      Destruction method: chemical removal    Lesion 2:     Body area:  Lower extremity    Lower extremity location:  R foot    Malignancy: benign lesion      Destruction method: chemical removal    Lesion 3:     Body area:  Lower extremity    Lower extremity location:  L foot    Malignancy: benign lesion      Destruction method: chemical removal

## 2024-07-26 ENCOUNTER — HOSPITAL ENCOUNTER (OUTPATIENT)
Dept: MAMMOGRAPHY | Facility: HOSPITAL | Age: 69
Discharge: HOME | End: 2024-07-26
Payer: COMMERCIAL

## 2024-07-26 VITALS — BODY MASS INDEX: 36.44 KG/M2 | WEIGHT: 198 LBS | HEIGHT: 62 IN

## 2024-07-26 DIAGNOSIS — Z12.31 ENCOUNTER FOR SCREENING MAMMOGRAM FOR BREAST CANCER: ICD-10-CM

## 2024-07-26 PROCEDURE — 77067 SCR MAMMO BI INCL CAD: CPT

## 2024-07-26 PROCEDURE — 77063 BREAST TOMOSYNTHESIS BI: CPT

## 2024-08-21 ENCOUNTER — OFFICE VISIT (OUTPATIENT)
Dept: PODIATRY | Facility: CLINIC | Age: 69
End: 2024-08-21
Payer: COMMERCIAL

## 2024-08-21 VITALS
HEIGHT: 62 IN | SYSTOLIC BLOOD PRESSURE: 134 MMHG | DIASTOLIC BLOOD PRESSURE: 93 MMHG | WEIGHT: 198 LBS | HEART RATE: 77 BPM | BODY MASS INDEX: 36.44 KG/M2 | OXYGEN SATURATION: 97 %

## 2024-08-21 DIAGNOSIS — B07.0 PLANTAR WART: Primary | ICD-10-CM

## 2024-08-21 DIAGNOSIS — E11.00 TYPE 2 DIABETES MELLITUS WITH HYPEROSMOLARITY WITHOUT COMA, WITHOUT LONG-TERM CURRENT USE OF INSULIN (HCC): ICD-10-CM

## 2024-08-21 PROCEDURE — RECHECK: Performed by: PODIATRIST

## 2024-08-21 PROCEDURE — 17110 DESTRUCTION B9 LES UP TO 14: CPT | Performed by: PODIATRIST

## 2024-08-21 NOTE — PROGRESS NOTES
Assessment/Plan:     The patient's clinical examination today significant for mild tenderness palpation to the distal lateral border of the right hallux nail plate where there is an incurvated spicule of nail noted.  There are no signs of infection. Verrucous lesions are noted to the distal lateral border of the right great toenail, the plantar right forefoot, and the plantar left heel.  There is loss of skin lines with tenderness to palpation lateral squeeze to all of these lesions.     The offending nail border was resected with a procedure without incident.  The verrucous lesions were debrided to pinpoint bleeding utilizing a sterile #15 blade.  They were then chemically ablated with application of Cantharone.  Dry dressings were applied over top of each lesion and are to be maintained for the next 48 hours.     Recommend follow-up in 3 to 4 weeks for follow-up of the verrucous lesions bilaterally.     Diagnoses and all orders for this visit:    Plantar wart    Type 2 diabetes mellitus with hyperosmolarity without coma, without long-term current use of insulin ([facility])          Subjective:     Patient ID: Darlin Garcia is a 68 y.o. female.    The patient presents today for follow up of bilateral plantar warts. She did note some tenderness from application of Cantharone at her last visit.  This subsided after a couple of days.  She is relatively pain-free today.      PAST MEDICAL HISTORY:  Past Medical History:   Diagnosis Date    Arthritis     Arthropathy     ONSET: 09FEB2011    Cellulitis     ONSET: 19AUG2009    Colon polyp     Costochondritis     ONSET: 23MAY2005    Dermatitis     ONSET: 25JAN2005    Diabetes mellitus (HCC)     borderline    Hemorrhagic detachment of retinal pigment epithelium     ONSET: 10FEB2009    Hx of vertigo     one year ago 2020-treated-no further episodes    Hypertension     LAST ASSESSED: 02JUN2014    Ingrown nail     LAST ASSESSED: 04MAY2010    Labyrinthitis     ONSET: 30NOV2007     "Lymphadenopathy     ONSET: 10OCT2007    Myalgia     ONSET: [id number]    Myositis     ONSET: [id number]    Nonallopathic lesion     ONSET: [id number]    Shortness of breath     ONSET: 30SEP2008-with exertion-had cardiac workup with stress test-was \"good\" per patient  2/23/21  DS    Systemic lupus erythematosus (HCC)     LAST ASSESSED: 02JUN2014    Tinea corporis     ONSET: [id number]    Vertigo     LAST ASSESSED: [id number]       PAST SURGICAL HISTORY:  Past Surgical History:   Procedure Laterality Date    COLONOSCOPY      EGD      FINGER SURGERY      left index        ALLERGIES:  Other and Penicillins    MEDICATIONS:  Current Outpatient Medications   Medication Sig Dispense Refill    aspirin (ECOTRIN LOW STRENGTH) 81 mg EC tablet Take 81 mg by mouth daily      atorvastatin (LIPITOR) 20 mg tablet TAKE 1 TABLET BY MOUTH EVERY DAY WITH DINNER 90 tablet 1    Cholecalciferol (VITAMIN D3) 3000 units TABS Take 1,000 Units by mouth 2 (two) times a day        Coenzyme Q10 (COQ10) 200 MG CAPS Take 1 capsule by mouth daily      ezetimibe (ZETIA) 10 mg tablet TAKE 1 TABLET BY MOUTH EVERY DAY 90 tablet 0    hydroxychloroquine (PLAQUENIL) 200 mg tablet 2 (two) times a day with meals      lisinopril (ZESTRIL) 40 mg tablet Take 1 tablet (40 mg total) by mouth daily 90 tablet 2    LORazepam (ATIVAN) 1 mg tablet TAKE 1 TABLET BY MOUTH ONE HOUR PRIOR TO APPOINTMENT AFTER CHECK IN MY REPEAT ONE TIME      metoprolol tartrate (LOPRESSOR) 25 mg tablet TAKE 0.5 TABLETS (12.5 MG TOTAL) BY MOUTH EVERY 12 (TWELVE) HOURS 90 tablet 1    Omega-3 Fatty Acids (fish oil) 1,000 mg Take 2 capsules (2,000 mg total) by mouth 2 (two) times a day 120 capsule 0    semaglutide, 0.25 or 0.5 mg/dose, (Ozempic, 0.25 or 0.5 MG/DOSE,) 2 mg/3 mL injection pen INJECT 0.75 ML (0.5 MG TOTAL) UNDER THE SKIN EVERY 7 DAYS 3 mL 1    albuterol (PROVENTIL HFA,VENTOLIN HFA) 90 mcg/act inhaler Inhale 2 puffs every 6 (six) hours as needed for wheezing (Patient not taking: " Reported on 2/7/2024) 8 g 0     No current facility-administered medications for this visit.       SOCIAL HISTORY:  Social History     Socioeconomic History    Marital status: Single     Spouse name: None    Number of children: None    Years of education: None    Highest education level: None   Occupational History    None   Tobacco Use    Smoking status: Never    Smokeless tobacco: Never   Vaping Use    Vaping status: Never Used   Substance and Sexual Activity    Alcohol use: Yes     Comment: rare    Drug use: No    Sexual activity: Not Currently   Other Topics Concern    None   Social History Narrative    DAILY COLA CONSUMPTION (1 CANS/DAY)     Social Determinants of Health     Financial Resource Strain: Not on file   Food Insecurity: Not on file   Transportation Needs: Not on file   Physical Activity: Not on file   Stress: Not on file   Social Connections: Not on file   Intimate Partner Violence: Not on file   Housing Stability: Not on file        Review of Systems   Constitutional: Negative.    HENT: Negative.     Eyes: Negative.    Respiratory: Negative.     Cardiovascular: Negative.    Endocrine: Negative.    Musculoskeletal: Negative.    Neurological: Negative.    Hematological: Negative.    Psychiatric/Behavioral: Negative.           Objective:     Physical Exam  Constitutional:       Appearance: Normal appearance.   HENT:      Head: Normocephalic and atraumatic.      Nose: Nose normal.   Cardiovascular:      Pulses:           Dorsalis pedis pulses are 2+ on the right side and 2+ on the left side.        Posterior tibial pulses are 2+ on the right side and 2+ on the left side.   Pulmonary:      Effort: Pulmonary effort is normal.   Musculoskeletal:        Feet:    Feet:      Comments: (+) callused well defined lesion plantar left heel, lateral right great toe, and plantar right forefoot with mild tenderness on lateral squeeze; punctate hematomas noted consistent with plantar verruca; (+) interruption of skin  "lines; no signs of infection  Skin:     General: Skin is warm.      Capillary Refill: Capillary refill takes less than 2 seconds.   Neurological:      General: No focal deficit present.      Mental Status: She is alert and oriented to person, place, and time.   Psychiatric:         Mood and Affect: Mood normal.         Behavior: Behavior normal.         Thought Content: Thought content normal.         Lesion Destruction    Date/Time: 8/21/2024 1:00 PM    Performed by: Gideon Maldonado DPM  Authorized by: Gideon Maldonado DPM  [place] Protocol:  procedure performed by consultantConsent: Verbal consent obtained.  Risks and benefits: risks, benefits and alternatives were discussed  Consent given by: patient  Time out: Immediately prior to procedure a \"time out\" was called to verify the correct patient, procedure, equipment, support staff and site/side marked as required.  Timeout called at: 8/21/2024 1:10 AM.  Patient understanding: patient states understanding of the procedure being performed  Patient identity confirmed: verbally with patient and provided demographic data    Procedure Details - Lesion Destruction:     Number of Lesions:  3  Lesion 1:     Body area:  Lower extremity    Lower extremity location:  R big toe    Malignancy: benign lesion      Destruction method: chemical removal    Lesion 2:     Body area:  Lower extremity    Lower extremity location:  R big toe    Malignancy: benign lesion      Destruction method: chemical removal    Lesion 3:     Body area:  Lower extremity    Lower extremity location:  L foot    Malignancy: benign lesion      Destruction method: chemical removal        "

## 2024-08-27 DIAGNOSIS — E78.5 HYPERLIPIDEMIA, UNSPECIFIED HYPERLIPIDEMIA TYPE: ICD-10-CM

## 2024-08-28 RX ORDER — ATORVASTATIN CALCIUM 20 MG/1
TABLET, FILM COATED ORAL
Qty: 90 TABLET | Refills: 1 | Status: SHIPPED | OUTPATIENT
Start: 2024-08-28

## 2024-08-31 DIAGNOSIS — E11.9 TYPE 2 DIABETES MELLITUS WITHOUT COMPLICATION, WITHOUT LONG-TERM CURRENT USE OF INSULIN (HCC): ICD-10-CM

## 2024-09-01 RX ORDER — SEMAGLUTIDE 0.68 MG/ML
INJECTION, SOLUTION SUBCUTANEOUS
Qty: 3 ML | Refills: 1 | Status: SHIPPED | OUTPATIENT
Start: 2024-09-01

## 2024-09-19 ENCOUNTER — OFFICE VISIT (OUTPATIENT)
Dept: PODIATRY | Facility: CLINIC | Age: 69
End: 2024-09-19
Payer: COMMERCIAL

## 2024-09-19 VITALS
SYSTOLIC BLOOD PRESSURE: 136 MMHG | BODY MASS INDEX: 36.25 KG/M2 | OXYGEN SATURATION: 97 % | WEIGHT: 197 LBS | HEART RATE: 82 BPM | DIASTOLIC BLOOD PRESSURE: 90 MMHG | HEIGHT: 62 IN

## 2024-09-19 DIAGNOSIS — B07.0 PLANTAR WART: Primary | ICD-10-CM

## 2024-09-19 PROCEDURE — 17110 DESTRUCTION B9 LES UP TO 14: CPT | Performed by: PODIATRIST

## 2024-09-19 PROCEDURE — RECHECK: Performed by: PODIATRIST

## 2024-09-19 NOTE — PROGRESS NOTES
Assessment/Plan:     The patient's clinical examination today significant for verrucous lesions to the distal lateral border of the right great toenail, the plantar right forefoot, and the plantar left heel.  There is loss of skin lines with tenderness to palpation lateral squeeze to all of these lesions.  Depth of the lesions appears to be improved across all the lesions.     The verrucous lesions were debrided to pinpoint bleeding utilizing a sterile #15 blade.  Silver nitrate was utilized for chemical cautery.  They were then chemically ablated with application of Cantharone.  Dry dressings were applied over top of each lesion and are to be maintained for the next 48 hours.     Recommend follow-up in 3 to 4 weeks for follow-up of the verrucous lesions bilaterally.        Diagnoses and all orders for this visit:    Plantar wart    Other orders  -     Lesion Destruction          Subjective:     Patient ID: Darlin Garcia is a 69 y.o. female.    The patient presents today for follow up of bilateral plantar warts. She has tolerated Cantharone treatment well.  She does note a period of 2 to 3 days where it is sore after treatment.  She does not have any significant pain today.      PAST MEDICAL HISTORY:  Past Medical History:   Diagnosis Date    Arthritis     Arthropathy     ONSET: 09FEB2011    Cellulitis     ONSET: 19AUG2009    Colon polyp     Costochondritis     ONSET: 23MAY2005    Dermatitis     ONSET: 25JAN2005    Diabetes mellitus (HCC)     borderline    Hemorrhagic detachment of retinal pigment epithelium     ONSET: 10FEB2009    Hx of vertigo     one year ago 2020-treated-no further episodes    Hypertension     LAST ASSESSED: 02JUN2014    Ingrown nail     LAST ASSESSED: 04MAY2010    Labyrinthitis     ONSET: 30NOV2007    Lymphadenopathy     ONSET: 10OCT2007    Myalgia     ONSET: 23MAY2005    Myositis     ONSET: 23MAY2005    Nonallopathic lesion     ONSET: [id number]    Shortness of breath     ONSET:  "30SEP2008-with exertion-had cardiac workup with stress test-was \"good\" per patient  2/23/21  DS    Systemic lupus erythematosus (HCC)     LAST ASSESSED: 02JUN2014    Tinea corporis     ONSET: [id number]    Vertigo     LAST ASSESSED: 15FEB2013       PAST SURGICAL HISTORY:  Past Surgical History:   Procedure Laterality Date    COLONOSCOPY      EGD      FINGER SURGERY      left index        ALLERGIES:  Other and Penicillins    MEDICATIONS:  Current Outpatient Medications   Medication Sig Dispense Refill    aspirin (ECOTRIN LOW STRENGTH) 81 mg EC tablet Take 81 mg by mouth daily      atorvastatin (LIPITOR) 20 mg tablet TAKE 1 TABLET BY MOUTH EVERY DAY WITH DINNER 90 tablet 1    Cholecalciferol (VITAMIN D3) 3000 units TABS Take 1,000 Units by mouth 2 (two) times a day        Coenzyme Q10 (COQ10) 200 MG CAPS Take 1 capsule by mouth daily      ezetimibe (ZETIA) 10 mg tablet TAKE 1 TABLET BY MOUTH EVERY DAY 90 tablet 0    hydroxychloroquine (PLAQUENIL) 200 mg tablet 2 (two) times a day with meals      lisinopril (ZESTRIL) 40 mg tablet Take 1 tablet (40 mg total) by mouth daily 90 tablet 2    LORazepam (ATIVAN) 1 mg tablet TAKE 1 TABLET BY MOUTH ONE HOUR PRIOR TO APPOINTMENT AFTER CHECK IN MY REPEAT ONE TIME      metoprolol tartrate (LOPRESSOR) 25 mg tablet TAKE 0.5 TABLETS (12.5 MG TOTAL) BY MOUTH EVERY 12 (TWELVE) HOURS 90 tablet 1    Omega-3 Fatty Acids (fish oil) 1,000 mg Take 2 capsules (2,000 mg total) by mouth 2 (two) times a day 120 capsule 0    semaglutide, 0.25 or 0.5 mg/dose, (Ozempic, 0.25 or 0.5 MG/DOSE,) 2 mg/3 mL injection pen INJECT 0.75 ML (0.5 MG TOTAL) UNDER THE SKIN EVERY 7 DAYS 3 mL 1    albuterol (PROVENTIL HFA,VENTOLIN HFA) 90 mcg/act inhaler Inhale 2 puffs every 6 (six) hours as needed for wheezing (Patient not taking: Reported on 2/7/2024) 8 g 0     No current facility-administered medications for this visit.       SOCIAL HISTORY:  Social History     Socioeconomic History    Marital status: Single     " Spouse name: None    Number of children: None    Years of education: None    Highest education level: None   Occupational History    None   Tobacco Use    Smoking status: Never    Smokeless tobacco: Never   Vaping Use    Vaping status: Never Used   Substance and Sexual Activity    Alcohol use: Yes     Comment: rare    Drug use: No    Sexual activity: Not Currently   Other Topics Concern    None   Social History Narrative    DAILY COLA CONSUMPTION (1 CANS/DAY)     Social Determinants of Health     Financial Resource Strain: Not on file   Food Insecurity: Not on file   Transportation Needs: Not on file   Physical Activity: Not on file   Stress: Not on file   Social Connections: Not on file   Intimate Partner Violence: Not on file   Housing Stability: Not on file        Review of Systems   Constitutional: Negative.    HENT: Negative.     Eyes: Negative.    Respiratory: Negative.     Cardiovascular: Negative.    Endocrine: Negative.    Musculoskeletal: Negative.    Neurological: Negative.    Hematological: Negative.    Psychiatric/Behavioral: Negative.           Objective:     Physical Exam  Constitutional:       Appearance: Normal appearance.   HENT:      Head: Normocephalic and atraumatic.      Nose: Nose normal.   Pulmonary:      Effort: Pulmonary effort is normal.   Skin:     General: Skin is warm.      Capillary Refill: Capillary refill takes less than 2 seconds.   Neurological:      General: No focal deficit present.      Mental Status: She is alert and oriented to person, place, and time.   Psychiatric:         Mood and Affect: Mood normal.         Behavior: Behavior normal.         Thought Content: Thought content normal.         Lesion Destruction    Date/Time: 9/19/2024 3:30 PM    Performed by: Gideon Maldonado DPM  Authorized by: Gideon Maldonado DPM  [place] Protocol:  procedure performed by consultantConsent: Verbal consent obtained.  Risks and benefits: risks, benefits and alternatives were  "discussed  Consent given by: patient  Time out: Immediately prior to procedure a \"time out\" was called to verify the correct patient, procedure, equipment, support staff and site/side marked as required.  Timeout called at: 9/19/2024 3:30 PM.  Patient understanding: patient states understanding of the procedure being performed  Patient identity confirmed: verbally with patient and provided demographic data    Procedure Details - Lesion Destruction:     Number of Lesions:  3  Lesion 1:     Body area:  Lower extremity    Lower extremity location:  R big toe    Malignancy: benign lesion      Destruction method: chemical removal    Lesion 2:     Body area:  Lower extremity    Lower extremity location:  R foot    Malignancy: benign lesion      Destruction method: chemical removal    Lesion 3:     Body area:  Lower extremity    Lower extremity location:  L foot    Malignancy: benign lesion      Destruction method: chemical removal        "

## 2024-09-22 LAB
ALBUMIN SERPL-MCNC: 4 G/DL (ref 3.9–4.9)
ALBUMIN/CREAT UR: 8 MG/G CREAT (ref 0–29)
ALP SERPL-CCNC: 65 IU/L (ref 44–121)
ALT SERPL-CCNC: 9 IU/L (ref 0–32)
APPEARANCE UR: CLEAR
AST SERPL-CCNC: 15 IU/L (ref 0–40)
BACTERIA URNS QL MICRO: NORMAL
BASOPHILS # BLD AUTO: 0.1 X10E3/UL (ref 0–0.2)
BASOPHILS NFR BLD AUTO: 1 %
BILIRUB SERPL-MCNC: 0.8 MG/DL (ref 0–1.2)
BILIRUB UR QL STRIP: NEGATIVE
BUN SERPL-MCNC: 10 MG/DL (ref 8–27)
BUN/CREAT SERPL: 14 (ref 12–28)
CALCIUM SERPL-MCNC: 9 MG/DL (ref 8.7–10.3)
CASTS URNS QL MICRO: NORMAL /LPF
CHLORIDE SERPL-SCNC: 107 MMOL/L (ref 96–106)
CHOLEST SERPL-MCNC: 111 MG/DL (ref 100–199)
CHOLEST/HDLC SERPL: 2.1 RATIO (ref 0–4.4)
CO2 SERPL-SCNC: 24 MMOL/L (ref 20–29)
COLOR UR: YELLOW
CREAT SERPL-MCNC: 0.72 MG/DL (ref 0.57–1)
CREAT UR-MCNC: 97.7 MG/DL
EGFR: 92 ML/MIN/1.73
EOSINOPHIL # BLD AUTO: 0.2 X10E3/UL (ref 0–0.4)
EOSINOPHIL NFR BLD AUTO: 4 %
EPI CELLS #/AREA URNS HPF: NORMAL /HPF (ref 0–10)
ERYTHROCYTE [DISTWIDTH] IN BLOOD BY AUTOMATED COUNT: 12.9 % (ref 11.7–15.4)
EST. AVERAGE GLUCOSE BLD GHB EST-MCNC: 120 MG/DL
GLOBULIN SER-MCNC: 2.4 G/DL (ref 1.5–4.5)
GLUCOSE SERPL-MCNC: 85 MG/DL (ref 70–99)
GLUCOSE UR QL: NEGATIVE
HBA1C MFR BLD: 5.8 % (ref 4.8–5.6)
HCT VFR BLD AUTO: 42.6 % (ref 34–46.6)
HCV AB S/CO SERPL IA: NON REACTIVE
HDLC SERPL-MCNC: 54 MG/DL
HGB BLD-MCNC: 13.5 G/DL (ref 11.1–15.9)
HGB UR QL STRIP: NEGATIVE
IMM GRANULOCYTES # BLD: 0 X10E3/UL (ref 0–0.1)
IMM GRANULOCYTES NFR BLD: 0 %
KETONES UR QL STRIP: NEGATIVE
LDLC SERPL CALC-MCNC: 42 MG/DL (ref 0–99)
LEUKOCYTE ESTERASE UR QL STRIP: NEGATIVE
LYMPHOCYTES # BLD AUTO: 1.8 X10E3/UL (ref 0.7–3.1)
LYMPHOCYTES NFR BLD AUTO: 27 %
MCH RBC QN AUTO: 28 PG (ref 26.6–33)
MCHC RBC AUTO-ENTMCNC: 31.7 G/DL (ref 31.5–35.7)
MCV RBC AUTO: 88 FL (ref 79–97)
MICRO URNS: NORMAL
MICRO URNS: NORMAL
MICROALBUMIN UR-MCNC: 7.8 UG/ML
MICRODELETION SYND BLD/T FISH: NORMAL
MONOCYTES # BLD AUTO: 0.5 X10E3/UL (ref 0.1–0.9)
MONOCYTES NFR BLD AUTO: 8 %
NEUTROPHILS # BLD AUTO: 4.1 X10E3/UL (ref 1.4–7)
NEUTROPHILS NFR BLD AUTO: 60 %
NITRITE UR QL STRIP: NEGATIVE
PH UR STRIP: 6.5 [PH] (ref 5–7.5)
PLATELET # BLD AUTO: 329 X10E3/UL (ref 150–450)
POTASSIUM SERPL-SCNC: 4.1 MMOL/L (ref 3.5–5.2)
PROT SERPL-MCNC: 6.4 G/DL (ref 6–8.5)
PROT UR QL STRIP: NEGATIVE
RBC # BLD AUTO: 4.82 X10E6/UL (ref 3.77–5.28)
RBC #/AREA URNS HPF: NORMAL /HPF (ref 0–2)
SL AMB VLDL CHOLESTEROL CALC: 15 MG/DL (ref 5–40)
SODIUM SERPL-SCNC: 145 MMOL/L (ref 134–144)
SP GR UR: 1.02 (ref 1–1.03)
TRIGL SERPL-MCNC: 74 MG/DL (ref 0–149)
UROBILINOGEN UR STRIP-ACNC: 0.2 MG/DL (ref 0.2–1)
WBC # BLD AUTO: 6.7 X10E3/UL (ref 3.4–10.8)
WBC #/AREA URNS HPF: NORMAL /HPF (ref 0–5)

## 2024-09-24 ENCOUNTER — TELEPHONE (OUTPATIENT)
Dept: FAMILY MEDICINE CLINIC | Facility: CLINIC | Age: 69
End: 2024-09-24

## 2024-09-24 NOTE — TELEPHONE ENCOUNTER
----- Message from Carmina Reyes MD sent at 9/24/2024  2:32 PM EDT -----  Due for physical in January please schedule

## 2024-09-25 ENCOUNTER — TELEPHONE (OUTPATIENT)
Dept: FAMILY MEDICINE CLINIC | Facility: CLINIC | Age: 69
End: 2024-09-25

## 2024-09-25 NOTE — TELEPHONE ENCOUNTER
----- Message from Carmina Reyes MD sent at 9/24/2024  2:10 PM EDT -----  Please advise patient that urine test is normal

## 2024-10-16 ENCOUNTER — TELEPHONE (OUTPATIENT)
Age: 69
End: 2024-10-16

## 2024-10-16 NOTE — TELEPHONE ENCOUNTER
Caller: Darlin Garcia    Doctor: Joel     Reason for call: Darlin needs to come in for her plantar warts.  She has been coming in every 3 - 4 weeks. She had to cancel tomorrow's appt.  But can we force her on sometime soon as I could not get her in till December.  Thank you.     Call back#: [phone]

## 2024-11-02 DIAGNOSIS — E11.9 TYPE 2 DIABETES MELLITUS WITHOUT COMPLICATION, WITHOUT LONG-TERM CURRENT USE OF INSULIN (HCC): ICD-10-CM

## 2024-11-04 RX ORDER — SEMAGLUTIDE 0.68 MG/ML
INJECTION, SOLUTION SUBCUTANEOUS
Qty: 3 ML | Refills: 5 | Status: SHIPPED | OUTPATIENT
Start: 2024-11-04

## 2024-11-14 ENCOUNTER — OFFICE VISIT (OUTPATIENT)
Dept: PODIATRY | Facility: CLINIC | Age: 69
End: 2024-11-14
Payer: COMMERCIAL

## 2024-11-14 VITALS
WEIGHT: 197 LBS | HEIGHT: 62 IN | SYSTOLIC BLOOD PRESSURE: 136 MMHG | DIASTOLIC BLOOD PRESSURE: 60 MMHG | BODY MASS INDEX: 36.25 KG/M2

## 2024-11-14 DIAGNOSIS — B07.0 PLANTAR WART: Primary | ICD-10-CM

## 2024-11-14 PROCEDURE — RECHECK: Performed by: PODIATRIST

## 2024-11-14 PROCEDURE — 17110 DESTRUCTION B9 LES UP TO 14: CPT | Performed by: PODIATRIST

## 2024-11-15 NOTE — PROGRESS NOTES
Assessment/Plan:     The patient's clinical examination today significant for verrucous lesions to the distal lateral border of the right great toenail, the plantar right forefoot, and the plantar left heel.  The right foot lesions are nearly resolved.  The left heel lesion is markedly improved.  There is loss of skin lines with tenderness to palpation lateral squeeze to all of these lesions.       The verrucous lesions were debrided to pinpoint bleeding utilizing a sterile #15 blade.  Silver nitrate was utilized for chemical cautery.  They were then chemically ablated with application of Cantharone.  Dry dressings were applied over top of each lesion and are to be maintained for the next 48 hours.     Recommend follow-up in 3 to 4 weeks for follow-up of the verrucous lesions bilaterally.     Diagnoses and all orders for this visit:    Plantar wart    Other orders  -     Lesion Destruction          Subjective:     Patient ID: Darlin Garcia is a 69 y.o. female.    The patient presents today for follow up of bilateral plantar warts. She does note a period of 2 to 3 days of soreness after Cantharone treatment of the lesions.  She does not have any significant pain today and feels that the lesions are improving.      PAST MEDICAL HISTORY:  Past Medical History:   Diagnosis Date    Arthritis     Arthropathy     ONSET: 09FEB2011    Cellulitis     ONSET: 19AUG2009    Colon polyp     Costochondritis     ONSET: 23MAY2005    Dermatitis     ONSET: 25JAN2005    Diabetes mellitus (HCC)     borderline    Hemorrhagic detachment of retinal pigment epithelium     ONSET: 10FEB2009    Hx of vertigo     one year ago 2020-treated-no further episodes    Hypertension     LAST ASSESSED: 02JUN2014    Ingrown nail     LAST ASSESSED: 04MAY2010    Labyrinthitis     ONSET: 30NOV2007    Lymphadenopathy     ONSET: 10OCT2007    Myalgia     ONSET: 23MAY2005    Myositis     ONSET: 23MAY2005    Nonallopathic lesion     ONSET: 07JUL2009     "Shortness of breath     ONSET: 30SEP2008-with exertion-had cardiac workup with stress test-was \"good\" per patient  2/23/21  DS    Systemic lupus erythematosus (HCC)     LAST ASSESSED: 02JUN2014    Tinea corporis     ONSET: [id number]    Vertigo     LAST ASSESSED: [id number]       PAST SURGICAL HISTORY:  Past Surgical History:   Procedure Laterality Date    COLONOSCOPY      EGD      FINGER SURGERY      left index        ALLERGIES:  Other and Penicillins    MEDICATIONS:  Current Outpatient Medications   Medication Sig Dispense Refill    aspirin (ECOTRIN LOW STRENGTH) 81 mg EC tablet Take 81 mg by mouth daily      atorvastatin (LIPITOR) 20 mg tablet TAKE 1 TABLET BY MOUTH EVERY DAY WITH DINNER 90 tablet 1    Cholecalciferol (VITAMIN D3) 3000 units TABS Take 1,000 Units by mouth 2 (two) times a day        Coenzyme Q10 (COQ10) 200 MG CAPS Take 1 capsule by mouth daily      ezetimibe (ZETIA) 10 mg tablet TAKE 1 TABLET BY MOUTH EVERY DAY 90 tablet 0    hydroxychloroquine (PLAQUENIL) 200 mg tablet 2 (two) times a day with meals      lisinopril (ZESTRIL) 40 mg tablet Take 1 tablet (40 mg total) by mouth daily 90 tablet 2    LORazepam (ATIVAN) 1 mg tablet TAKE 1 TABLET BY MOUTH ONE HOUR PRIOR TO APPOINTMENT AFTER CHECK IN MY REPEAT ONE TIME      metoprolol tartrate (LOPRESSOR) 25 mg tablet TAKE 0.5 TABLETS (12.5 MG TOTAL) BY MOUTH EVERY 12 (TWELVE) HOURS 90 tablet 1    Omega-3 Fatty Acids (fish oil) 1,000 mg Take 2 capsules (2,000 mg total) by mouth 2 (two) times a day 120 capsule 0    semaglutide, 0.25 or 0.5 mg/dose, (Ozempic, 0.25 or 0.5 MG/DOSE,) 2 mg/3 mL injection pen INJECT 0.75 ML (0.5 MG TOTAL) UNDER THE SKIN EVERY 7 DAYS 3 mL 5    albuterol (PROVENTIL HFA,VENTOLIN HFA) 90 mcg/act inhaler Inhale 2 puffs every 6 (six) hours as needed for wheezing (Patient not taking: Reported on 11/14/2024) 8 g 0     No current facility-administered medications for this visit.       SOCIAL HISTORY:  Social History     Socioeconomic " History    Marital status: Single     Spouse name: None    Number of children: None    Years of education: None    Highest education level: None   Occupational History    None   Tobacco Use    Smoking status: Never    Smokeless tobacco: Never   Vaping Use    Vaping status: Never Used   Substance and Sexual Activity    Alcohol use: Yes     Comment: rare    Drug use: No    Sexual activity: Not Currently   Other Topics Concern    None   Social History Narrative    DAILY COLA CONSUMPTION (1 CANS/DAY)     Social Drivers of Health     Financial Resource Strain: Not on file   Food Insecurity: Not on file   Transportation Needs: Not on file   Physical Activity: Not on file   Stress: Not on file   Social Connections: Not on file   Intimate Partner Violence: Not on file   Housing Stability: Not on file        Review of Systems   Constitutional: Negative.    HENT: Negative.     Eyes: Negative.    Respiratory: Negative.     Cardiovascular: Negative.    Endocrine: Negative.    Musculoskeletal: Negative.    Neurological: Negative.    Hematological: Negative.    Psychiatric/Behavioral: Negative.           Objective:     Physical Exam  Constitutional:       Appearance: Normal appearance.   HENT:      Head: Normocephalic and atraumatic.      Nose: Nose normal.   Pulmonary:      Effort: Pulmonary effort is normal.   Skin:     General: Skin is warm.      Capillary Refill: Capillary refill takes less than 2 seconds.   Neurological:      General: No focal deficit present.      Mental Status: She is alert and oriented to person, place, and time.   Psychiatric:         Mood and Affect: Mood normal.         Behavior: Behavior normal.         Thought Content: Thought content normal.         Lesion Destruction    Date/Time: 11/14/2024 3:30 PM    Performed by: Gideon Maldonado DPM  Authorized by: Gideon Maldonado DPM  [facility] Protocol:  procedure performed by consultantConsent: Verbal consent obtained.  Risks and benefits: risks, benefits  "and alternatives were discussed  Consent given by: patient  Time out: Immediately prior to procedure a \"time out\" was called to verify the correct patient, procedure, equipment, support staff and site/side marked as required.  Timeout called at: 11/14/2024 3:30 PM.  Patient understanding: patient states understanding of the procedure being performed  Patient identity confirmed: verbally with patient and provided demographic data    Procedure Details - Lesion Destruction:     Number of Lesions:  3  Lesion 1:     Body area:  Lower extremity    Lower extremity location:  R big toe    Malignancy: benign lesion      Destruction method: chemical removal    Lesion 2:     Body area:  Lower extremity    Lower extremity location:  R foot    Malignancy: benign lesion      Destruction method: chemical removal    Lesion 3:     Body area:  Lower extremity    Lower extremity location:  L foot    Malignancy: benign lesion      Destruction method: chemical removal        "

## 2024-12-23 ENCOUNTER — OFFICE VISIT (OUTPATIENT)
Dept: PODIATRY | Facility: CLINIC | Age: 69
End: 2024-12-23
Payer: COMMERCIAL

## 2024-12-23 VITALS
SYSTOLIC BLOOD PRESSURE: 121 MMHG | OXYGEN SATURATION: 97 % | BODY MASS INDEX: 36.25 KG/M2 | DIASTOLIC BLOOD PRESSURE: 60 MMHG | HEIGHT: 62 IN | WEIGHT: 197 LBS | HEART RATE: 59 BPM

## 2024-12-23 DIAGNOSIS — D36.10 NEUROMA: ICD-10-CM

## 2024-12-23 DIAGNOSIS — B07.0 PLANTAR WART: Primary | ICD-10-CM

## 2024-12-23 PROCEDURE — 99213 OFFICE O/P EST LOW 20 MIN: CPT | Performed by: PODIATRIST

## 2024-12-23 NOTE — PATIENT INSTRUCTIONS
Recommend quality over the counter arch supports:    - Powerstep [place] series insoles- 3/4 length  - Spenco Orthotic Arch insoles-  3/4 length  - PCSsole- 3/4 length insoles

## 2024-12-23 NOTE — PROGRESS NOTES
Assessment/Plan:     The patient's clinical examination today significant for verrucous lesions to the distal lateral border of the right great toenail, the plantar right forefoot, and the plantar left heel.  The right lateral forefoot lesion is resolved.  The lateral right hallux lesion is nearly resolved.  The left heel lesion continues to improve.  There is loss of skin lines with decreased tenderness to palpation lateral squeeze to all of these lesions.  The patient also notes some mild tenderness with palpation of the left third and fourth intermetatarsal spaces.  She does note numbness and tingling of the associated digits consistent with a neuritis/neuroma.     The verrucous lesions were debrided to pinpoint bleeding utilizing a sterile #15 blade.  Silver nitrate was utilized for chemical cautery.  They were then chemically ablated with application of Cantharone.  Dry dressings were applied over top of each lesion and are to be maintained for the next 48 hours.     Her symptoms of neuroma and neuritis are relatively new and have only occurred in the past 2 weeks or so.  There is no history of injury or trauma.  Recommended thicker soled shoes with wider toe boxes to reduce impingement of the neuromas.  Some good-quality OTC arch supports can also be helpful.  Some recommendations were added to her AVS.  We can also consider adding metatarsal pads to them if they are not effective as they are out of the box.    Recommend follow-up in 3 to 4 weeks for follow-up of the verrucous lesions bilaterally.        Diagnoses and all orders for this visit:    Plantar wart    Neuroma          Subjective:     Patient ID: Darlin Garcia is a 69 y.o. female.    The patient presents today for follow up of bilateral plantar warts. She does note a period of 2 to 3 days of soreness after Cantharone treatment of the lesions.  She does not have any significant pain today and feels that the lesions are improving.  She does note  "some new onset numbness and tingling sensations to her left third fourth and fifth toes.  This is only started in the last 2 weeks or so.  She denies any history of injury or trauma.  Symptoms are transient in nature and tend to come and go at this time.           PAST MEDICAL HISTORY:  Past Medical History:   Diagnosis Date    Arthritis     Arthropathy     ONSET: 09FEB2011    Cellulitis     ONSET: [id number]    Colon polyp     Costochondritis     ONSET: 23MAY2005    Dermatitis     ONSET: 25JAN2005    Diabetes mellitus (HCC)     borderline    Hemorrhagic detachment of retinal pigment epithelium     ONSET: 10FEB2009    Hx of vertigo     one year ago 2020-treated-no further episodes    Hypertension     LAST ASSESSED: 02JUN2014    Ingrown nail     LAST ASSESSED: 04MAY2010    Labyrinthitis     ONSET: 30NOV2007    Lymphadenopathy     ONSET: 10OCT2007    Myalgia     ONSET: 23MAY2005    Myositis     ONSET: 23MAY2005    Nonallopathic lesion     ONSET: [id number]    Shortness of breath     ONSET: 30SEP2008-with exertion-had cardiac workup with stress test-was \"good\" per patient  2/23/21  DS    Systemic lupus erythematosus (HCC)     LAST ASSESSED: 02JUN2014    Tinea corporis     ONSET: [id number]    Vertigo     LAST ASSESSED: [id number]       PAST SURGICAL HISTORY:  Past Surgical History:   Procedure Laterality Date    COLONOSCOPY      EGD      FINGER SURGERY      left index        ALLERGIES:  Other and Penicillins    MEDICATIONS:  Current Outpatient Medications   Medication Sig Dispense Refill    aspirin (ECOTRIN LOW STRENGTH) 81 mg EC tablet Take 81 mg by mouth daily      atorvastatin (LIPITOR) 20 mg tablet TAKE 1 TABLET BY MOUTH EVERY DAY WITH DINNER 90 tablet 1    Cholecalciferol (VITAMIN D3) 3000 units TABS Take 1,000 Units by mouth 2 (two) times a day        Coenzyme Q10 (COQ10) 200 MG CAPS Take 1 capsule by mouth daily      ezetimibe (ZETIA) 10 mg tablet TAKE 1 TABLET BY MOUTH EVERY DAY 90 tablet 0    hydroxychloroquine " (PLAQUENIL) 200 mg tablet 2 (two) times a day with meals      lisinopril (ZESTRIL) 40 mg tablet Take 1 tablet (40 mg total) by mouth daily 90 tablet 2    LORazepam (ATIVAN) 1 mg tablet TAKE 1 TABLET BY MOUTH ONE HOUR PRIOR TO APPOINTMENT AFTER CHECK IN MY REPEAT ONE TIME      metoprolol tartrate (LOPRESSOR) 25 mg tablet TAKE 0.5 TABLETS (12.5 MG TOTAL) BY MOUTH EVERY 12 (TWELVE) HOURS 90 tablet 1    Omega-3 Fatty Acids (fish oil) 1,000 mg Take 2 capsules (2,000 mg total) by mouth 2 (two) times a day 120 capsule 0    semaglutide, 0.25 or 0.5 mg/dose, (Ozempic, 0.25 or 0.5 MG/DOSE,) 2 mg/3 mL injection pen INJECT 0.75 ML (0.5 MG TOTAL) UNDER THE SKIN EVERY 7 DAYS 3 mL 5    albuterol (PROVENTIL HFA,VENTOLIN HFA) 90 mcg/act inhaler Inhale 2 puffs every 6 (six) hours as needed for wheezing (Patient not taking: Reported on 11/14/2024) 8 g 0     No current facility-administered medications for this visit.       SOCIAL HISTORY:  Social History     Socioeconomic History    Marital status: Single     Spouse name: None    Number of children: None    Years of education: None    Highest education level: None   Occupational History    None   Tobacco Use    Smoking status: Never    Smokeless tobacco: Never   Vaping Use    Vaping status: Never Used   Substance and Sexual Activity    Alcohol use: Yes     Comment: rare    Drug use: No    Sexual activity: Not Currently   Other Topics Concern    None   Social History Narrative    DAILY COLA CONSUMPTION (1 CANS/DAY)     Social Drivers of Health     Financial Resource Strain: Not on file   Food Insecurity: Not on file   Transportation Needs: Not on file   Physical Activity: Not on file   Stress: Not on file   Social Connections: Not on file   Intimate Partner Violence: Not on file   Housing Stability: Not on file        Review of Systems   Constitutional: Negative.    HENT: Negative.     Eyes: Negative.    Respiratory: Negative.     Cardiovascular: Negative.    Endocrine: Negative.     Musculoskeletal: Negative.    Neurological: Negative.    Hematological: Negative.    Psychiatric/Behavioral: Negative.           Objective:     Physical Exam  Constitutional:       Appearance: Normal appearance.   HENT:      Head: Normocephalic and atraumatic.      Nose: Nose normal.   Cardiovascular:      Pulses:           Dorsalis pedis pulses are 2+ on the left side.        Posterior tibial pulses are 2+ on the left side.   Pulmonary:      Effort: Pulmonary effort is normal.   Musculoskeletal:        Feet:    Feet:      Comments: The patient's clinical examination today significant for verrucous lesions to the distal lateral border of the right great toenail, the plantar right forefoot, and the plantar left heel.  The right lateral forefoot lesion is resolved.  The lateral right hallux lesion is nearly resolved.  The left heel lesion continues to improve.  There is loss of skin lines with decreased tenderness to palpation lateral squeeze to all of these lesions.  The patient also notes some mild tenderness with palpation of the left third and fourth intermetatarsal spaces.  She does note numbness and tingling of the associated digits consistent with a neuritis/neuroma.  Skin:     General: Skin is warm.      Capillary Refill: Capillary refill takes less than 2 seconds.   Neurological:      General: No focal deficit present.      Mental Status: She is alert and oriented to person, place, and time.   Psychiatric:         Mood and Affect: Mood normal.         Behavior: Behavior normal.         Thought Content: Thought content normal.

## 2025-01-17 ENCOUNTER — OFFICE VISIT (OUTPATIENT)
Dept: FAMILY MEDICINE CLINIC | Facility: CLINIC | Age: 70
End: 2025-01-17
Payer: COMMERCIAL

## 2025-01-17 VITALS
SYSTOLIC BLOOD PRESSURE: 150 MMHG | HEIGHT: 61 IN | BODY MASS INDEX: 37.76 KG/M2 | WEIGHT: 200 LBS | DIASTOLIC BLOOD PRESSURE: 70 MMHG | RESPIRATION RATE: 16 BRPM | TEMPERATURE: 97.5 F | HEART RATE: 80 BPM | OXYGEN SATURATION: 97 %

## 2025-01-17 DIAGNOSIS — Z00.00 PHYSICAL EXAM: Primary | ICD-10-CM

## 2025-01-17 DIAGNOSIS — R06.83 SNORING: ICD-10-CM

## 2025-01-17 DIAGNOSIS — E11.9 TYPE 2 DIABETES MELLITUS WITHOUT COMPLICATION, WITHOUT LONG-TERM CURRENT USE OF INSULIN (HCC): ICD-10-CM

## 2025-01-17 DIAGNOSIS — E78.5 HYPERLIPIDEMIA, UNSPECIFIED HYPERLIPIDEMIA TYPE: ICD-10-CM

## 2025-01-17 LAB — SL AMB POCT HEMOGLOBIN AIC: 5.5 (ref ?–6.5)

## 2025-01-17 PROCEDURE — 99173 VISUAL ACUITY SCREEN: CPT | Performed by: FAMILY MEDICINE

## 2025-01-17 PROCEDURE — 83036 HEMOGLOBIN GLYCOSYLATED A1C: CPT | Performed by: FAMILY MEDICINE

## 2025-01-17 PROCEDURE — 99214 OFFICE O/P EST MOD 30 MIN: CPT | Performed by: FAMILY MEDICINE

## 2025-01-17 PROCEDURE — 99397 PER PM REEVAL EST PAT 65+ YR: CPT | Performed by: FAMILY MEDICINE

## 2025-01-17 NOTE — PROGRESS NOTES
Adult Annual Physical  Name: Darlin Garcia      : 1955      MRN: [id number]  Encounter Provider: Carmina Reyes MD  Encounter Date: 2025   Encounter department: [facility]    Assessment & Plan  Physical exam         Type 2 diabetes mellitus without complication, without long-term current use of insulin (HCC)  Type 2 diabetes doing very well.  From a weight standpoint we would like to increase her Ozempic to 1 mg.  Lab Results   Component Value Date    HGBA1C 5.5 2025       Orders:  •  POCT hemoglobin A1c  •  semaglutide, 1 mg/dose, (Ozempic) 4 mg/3 mL injection pen; Inject 0.75 mL (1 mg total) under the skin once a week    Hyperlipidemia, unspecified hyperlipidemia type  Continue on Lipitor 20 mg daily       Snoring  Recommend sleep study evaluation  Orders:  •  Ambulatory Referral to Sleep Medicine; Future           History of Present Illness     Adult Annual Physical:  Patient presents for annual physical. 69-year-old female presenting to the office for evaluation.  Overall patient states she has been doing fairly well.  States that she has been helping her daughter go through the IVF journey.  Patient states that when she was on vacation her son-in-law and daughter both stated that they witnessed her to have apnea episodes when sleeping and needed very concerned and would like her to get evaluated if possible patient has been tolerating the Ozempic without any issues.  Would like to proceed with increasing if possible.     Diet and Physical Activity:  - Diet/Nutrition: well balanced diet.  - Exercise: no formal exercise.    Depression Screening:  - PHQ-2 Score: 0    General Health:  - Sleep: sleeps well.  - Hearing: normal hearing right ear and normal hearing left ear.  - Vision: no vision problems.  - Dental: regular dental visits.    Review of Systems   Constitutional:  Positive for fatigue. Negative for activity change, appetite change, chills and fever.    HENT:  Negative for congestion.    Respiratory:  Negative for cough, chest tightness and shortness of breath.    Cardiovascular:  Negative for chest pain and leg swelling.   Gastrointestinal:  Negative for abdominal distention, abdominal pain, constipation, diarrhea, nausea and vomiting.   Psychiatric/Behavioral:  Positive for sleep disturbance (snoring , daughter says she is gasping for air.).    All other systems reviewed and are negative.    Current Outpatient Medications on File Prior to Visit   Medication Sig Dispense Refill   • albuterol (PROVENTIL HFA,VENTOLIN HFA) 90 mcg/act inhaler Inhale 2 puffs every 6 (six) hours as needed for wheezing 8 g 0   • aspirin (ECOTRIN LOW STRENGTH) 81 mg EC tablet Take 81 mg by mouth daily     • atorvastatin (LIPITOR) 20 mg tablet TAKE 1 TABLET BY MOUTH EVERY DAY WITH DINNER 90 tablet 1   • Cholecalciferol (VITAMIN D3) 3000 units TABS Take 1,000 Units by mouth 2 (two) times a day       • Coenzyme Q10 (COQ10) 200 MG CAPS Take 1 capsule by mouth daily     • ezetimibe (ZETIA) 10 mg tablet TAKE 1 TABLET BY MOUTH EVERY DAY 90 tablet 0   • hydroxychloroquine (PLAQUENIL) 200 mg tablet 2 (two) times a day with meals     • lisinopril (ZESTRIL) 40 mg tablet Take 1 tablet (40 mg total) by mouth daily 90 tablet 2   • LORazepam (ATIVAN) 1 mg tablet TAKE 1 TABLET BY MOUTH ONE HOUR PRIOR TO APPOINTMENT AFTER CHECK IN MY REPEAT ONE TIME     • metoprolol tartrate (LOPRESSOR) 25 mg tablet TAKE 0.5 TABLETS (12.5 MG TOTAL) BY MOUTH EVERY 12 (TWELVE) HOURS 90 tablet 1   • Omega-3 Fatty Acids (fish oil) 1,000 mg Take 2 capsules (2,000 mg total) by mouth 2 (two) times a day 120 capsule 0   • semaglutide, 0.25 or 0.5 mg/dose, (Ozempic, 0.25 or 0.5 MG/DOSE,) 2 mg/3 mL injection pen INJECT 0.75 ML (0.5 MG TOTAL) UNDER THE SKIN EVERY 7 DAYS 3 mL 5     No current facility-administered medications on file prior to visit.        Objective   /70 (BP Location: Left arm, Patient Position: Sitting,  "Cuff Size: Large)   Pulse 80   Temp 97.5 °F (36.4 °C) (Temporal)   Resp 16   Ht 5' 1\" (1.549 m)   Wt 90.7 kg (200 lb)   SpO2 97%   BMI 37.79 kg/m²     Physical Exam  Vitals reviewed.   Constitutional:       Appearance: Normal appearance. She is well-developed.   HENT:      Head: Normocephalic and atraumatic.      Right Ear: Tympanic membrane, ear canal and external ear normal. There is no impacted cerumen.      Left Ear: Tympanic membrane, ear canal and external ear normal. There is no impacted cerumen.      Nose: Nose normal.      Mouth/Throat:      Mouth: Mucous membranes are moist.      Pharynx: Oropharynx is clear.   Eyes:      Conjunctiva/sclera: Conjunctivae normal.      Pupils: Pupils are equal, round, and reactive to light.   Cardiovascular:      Rate and Rhythm: Normal rate and regular rhythm.      Heart sounds: Normal heart sounds.   Pulmonary:      Effort: Pulmonary effort is normal.      Breath sounds: Normal breath sounds.   Abdominal:      General: Abdomen is flat. Bowel sounds are normal.      Palpations: Abdomen is soft.   Musculoskeletal:         General: Normal range of motion.      Cervical back: Normal range of motion and neck supple.   Skin:     General: Skin is warm.      Capillary Refill: Capillary refill takes less than 2 seconds.   Neurological:      General: No focal deficit present.      Mental Status: She is alert and oriented to person, place, and time. Mental status is at baseline.   Psychiatric:         Mood and Affect: Mood normal.         Behavior: Behavior normal.         Thought Content: Thought content normal.         Judgment: Judgment normal.         "

## 2025-01-20 ENCOUNTER — OFFICE VISIT (OUTPATIENT)
Dept: PODIATRY | Facility: CLINIC | Age: 70
End: 2025-01-20
Payer: COMMERCIAL

## 2025-01-20 ENCOUNTER — TELEPHONE (OUTPATIENT)
Dept: ADMINISTRATIVE | Facility: OTHER | Age: 70
End: 2025-01-20

## 2025-01-20 VITALS
WEIGHT: 200 LBS | SYSTOLIC BLOOD PRESSURE: 130 MMHG | HEART RATE: 87 BPM | DIASTOLIC BLOOD PRESSURE: 70 MMHG | OXYGEN SATURATION: 98 % | BODY MASS INDEX: 37.76 KG/M2 | HEIGHT: 61 IN

## 2025-01-20 DIAGNOSIS — B07.0 PLANTAR WART: ICD-10-CM

## 2025-01-20 DIAGNOSIS — D36.10 NEUROMA: Primary | ICD-10-CM

## 2025-01-20 PROCEDURE — 17110 DESTRUCTION B9 LES UP TO 14: CPT | Performed by: PODIATRIST

## 2025-01-20 PROCEDURE — 99213 OFFICE O/P EST LOW 20 MIN: CPT | Performed by: PODIATRIST

## 2025-01-20 NOTE — TELEPHONE ENCOUNTER
----- Message from Patience BRUNO sent at 1/17/2025  3:38 PM EST -----  Regarding: care gap request  01/17/25 3:38 PM    Hello, our patient attached above has had DEXA Scan completed/performed. Please assist in updating the patient chart by making an External outreach to [facility]  facility located in [place] . The date of service is 2025.    Thank you,  Patience JOSE

## 2025-01-20 NOTE — TELEPHONE ENCOUNTER
Upon review of the In Basket request we were able to locate, review, and update the patient chart as requested for DEXA Scan.    Any additional questions or concerns should be emailed to the Practice Liaisons via the appropriate education email address, please do not reply via In Basket.    Thank you  Carlee Muñoz MA   [facility]

## 2025-01-20 NOTE — PROGRESS NOTES
Assessment/Plan:     The patient's clinical examination today significant for verrucous lesions to the distal lateral border of the right great toenail and the plantar left heel.  The right lateral forefoot lesion is resolved.  The left heel lesion shows excellent interval improvement.  There is still some mild tenderness with palpation of the left third and fourth intermetatarsal spaces.  She does note numbness and tingling of the associated digits consistent with a neuritis/neuroma.     The verrucous lesions were debrided B/L to pinpoint bleeding utilizing a sterile #15 blade.  Silver nitrate was utilized for chemical cautery.  They were then chemically ablated with application of Cantharone.  Dry dressings were applied over top of each lesion and are to be maintained for the next 48 hours.     I did add some metatarsal pads of her OTC arch supports today to further offload the forefoot bilaterally for management of her neuromas..       Recommend follow-up in 4 weeks for follow-up of the verrucous lesions bilaterally.        Diagnoses and all orders for this visit:    Neuroma    Plantar wart          Subjective:     Patient ID: Darlin Garcia is a 69 y.o. female.    The patient presents today for follow up of bilateral plantar warts. She does note a period of 2 to 3 days of soreness after Cantharone treatment of the lesions.  She does not have any significant pain today and feels that the lesions are improving.  She does note some new onset numbness and tingling sensations to her left third fourth and fifth toes.  This is only started in the last 2 weeks or so.  She denies any history of injury or trauma.  Symptoms are transient in nature and tend to come and go at this time.      PAST MEDICAL HISTORY:  Past Medical History:   Diagnosis Date    Arthritis     Arthropathy     ONSET: 09FEB2011    Cellulitis     ONSET: 19AUG2009    Colon polyp     Costochondritis     ONSET: 23MAY2005    Dermatitis     ONSET:  "25JAN2005    Diabetes mellitus (HCC)     borderline    Hemorrhagic detachment of retinal pigment epithelium     ONSET: [id number]    Hx of vertigo     one year ago 2020-treated-no further episodes    Hypertension     LAST ASSESSED: 02JUN2014    Ingrown nail     LAST ASSESSED: 04MAY2010    Labyrinthitis     ONSET: 30NOV2007    Lymphadenopathy     ONSET: 10OCT2007    Myalgia     ONSET: [id number]    Myositis     ONSET: 23MAY2005    Nonallopathic lesion     ONSET: [id number]    Shortness of breath     ONSET: 30SEP2008-with exertion-had cardiac workup with stress test-was \"good\" per patient  2/23/21  DS    Systemic lupus erythematosus (HCC)     LAST ASSESSED: 02JUN2014    Tinea corporis     ONSET: 79NJV0844    Vertigo     LAST ASSESSED: 15FEB2013       PAST SURGICAL HISTORY:  Past Surgical History:   Procedure Laterality Date    COLONOSCOPY      EGD      FINGER SURGERY      left index        ALLERGIES:  Other and Penicillins    MEDICATIONS:  Current Outpatient Medications   Medication Sig Dispense Refill    albuterol (PROVENTIL HFA,VENTOLIN HFA) 90 mcg/act inhaler Inhale 2 puffs every 6 (six) hours as needed for wheezing 8 g 0    aspirin (ECOTRIN LOW STRENGTH) 81 mg EC tablet Take 81 mg by mouth daily      atorvastatin (LIPITOR) 20 mg tablet TAKE 1 TABLET BY MOUTH EVERY DAY WITH DINNER 90 tablet 1    Cholecalciferol (VITAMIN D3) 3000 units TABS Take 1,000 Units by mouth 2 (two) times a day        Coenzyme Q10 (COQ10) 200 MG CAPS Take 1 capsule by mouth daily      ezetimibe (ZETIA) 10 mg tablet TAKE 1 TABLET BY MOUTH EVERY DAY 90 tablet 0    hydroxychloroquine (PLAQUENIL) 200 mg tablet 2 (two) times a day with meals      lisinopril (ZESTRIL) 40 mg tablet Take 1 tablet (40 mg total) by mouth daily 90 tablet 2    LORazepam (ATIVAN) 1 mg tablet TAKE 1 TABLET BY MOUTH ONE HOUR PRIOR TO APPOINTMENT AFTER CHECK IN MY REPEAT ONE TIME      metoprolol tartrate (LOPRESSOR) 25 mg tablet TAKE 0.5 TABLETS (12.5 MG TOTAL) BY MOUTH EVERY " 12 (TWELVE) HOURS 90 tablet 1    Omega-3 Fatty Acids (fish oil) 1,000 mg Take 2 capsules (2,000 mg total) by mouth 2 (two) times a day 120 capsule 0    semaglutide, 0.25 or 0.5 mg/dose, (Ozempic, 0.25 or 0.5 MG/DOSE,) 2 mg/3 mL injection pen INJECT 0.75 ML (0.5 MG TOTAL) UNDER THE SKIN EVERY 7 DAYS 3 mL 5    semaglutide, 1 mg/dose, (Ozempic) 4 mg/3 mL injection pen Inject 0.75 mL (1 mg total) under the skin once a week 3 mL 0     No current facility-administered medications for this visit.       SOCIAL HISTORY:  Social History     Socioeconomic History    Marital status: Single     Spouse name: None    Number of children: None    Years of education: None    Highest education level: None   Occupational History    None   Tobacco Use    Smoking status: Never    Smokeless tobacco: Never   Vaping Use    Vaping status: Never Used   Substance and Sexual Activity    Alcohol use: Yes     Comment: rare    Drug use: No    Sexual activity: Not Currently   Other Topics Concern    None   Social History Narrative    DAILY COLA CONSUMPTION (1 CANS/DAY)     Social Drivers of Health     Financial Resource Strain: Not on file   Food Insecurity: Not on file   Transportation Needs: Not on file   Physical Activity: Not on file   Stress: Not on file   Social Connections: Not on file   Intimate Partner Violence: Not on file   Housing Stability: Not on file        Review of Systems   Constitutional: Negative.    HENT: Negative.     Eyes: Negative.    Respiratory: Negative.     Cardiovascular: Negative.    Endocrine: Negative.    Musculoskeletal: Negative.    Neurological: Negative.    Hematological: Negative.    Psychiatric/Behavioral: Negative.           Objective:     Physical Exam  Constitutional:       Appearance: Normal appearance.   HENT:      Head: Normocephalic and atraumatic.      Nose: Nose normal.   Cardiovascular:      Pulses:           Dorsalis pedis pulses are 2+ on the right side and 2+ on the left side.        Posterior  "tibial pulses are 2+ on the right side and 2+ on the left side.   Pulmonary:      Effort: Pulmonary effort is normal.   Musculoskeletal:        Feet:    Feet:      Right foot:      Skin integrity: Skin integrity normal.      Left foot:      Skin integrity: Skin integrity normal.      Comments: The patient's clinical examination today significant for verrucous lesions to the distal lateral border of the right great toenail and the plantar left heel.  The right lateral forefoot lesion is resolved.  The left heel lesion shows excellent interval improvement.  There is still some mild tenderness with palpation of the left third and fourth intermetatarsal spaces.  She does note numbness and tingling of the associated digits consistent with a neuritis/neuroma.     Skin:     General: Skin is warm.      Capillary Refill: Capillary refill takes less than 2 seconds.   Neurological:      General: No focal deficit present.      Mental Status: She is alert and oriented to person, place, and time.   Psychiatric:         Mood and Affect: Mood normal.         Behavior: Behavior normal.         Thought Content: Thought content normal.         Lesion Destruction    Date/Time: 1/20/2025 1:00 PM    Performed by: Gideon Maldonado DPM  Authorized by: Gideon Maldonado DPM  [place] Protocol:  procedure performed by consultantConsent: Verbal consent obtained.  Risks and benefits: risks, benefits and alternatives were discussed  Consent given by: patient  Time out: Immediately prior to procedure a \"time out\" was called to verify the correct patient, procedure, equipment, support staff and site/side marked as required.  Timeout called at: 1/20/2025 1:00 PM.  Patient understanding: patient states understanding of the procedure being performed  Patient identity confirmed: verbally with patient and provided demographic data    Procedure Details - Lesion Destruction:     Number of Lesions:  2  Lesion 1:     Body area:  Lower extremity    Lower " extremity location:  R big toe    Malignancy: benign lesion      Destruction method: chemical removal    Lesion 2:     Body area:  Lower extremity    Lower extremity location:  L foot    Malignancy: benign lesion      Destruction method: chemical removal

## 2025-02-19 ENCOUNTER — OFFICE VISIT (OUTPATIENT)
Dept: PODIATRY | Facility: CLINIC | Age: 70
End: 2025-02-19
Payer: COMMERCIAL

## 2025-02-19 VITALS — BODY MASS INDEX: 37 KG/M2 | OXYGEN SATURATION: 97 % | WEIGHT: 196 LBS | HEART RATE: 100 BPM | HEIGHT: 61 IN

## 2025-02-19 DIAGNOSIS — B07.0 PLANTAR WART: Primary | ICD-10-CM

## 2025-02-19 PROCEDURE — RECHECK: Performed by: PODIATRIST

## 2025-02-19 PROCEDURE — 17110 DESTRUCTION B9 LES UP TO 14: CPT | Performed by: PODIATRIST

## 2025-02-19 NOTE — PROGRESS NOTES
":  Assessment & Plan  Plantar wart         The patient's clinical examination today significant for a resolved verrucous lesion to the distal lateral border of the right great toenail.  The left heel lesion shows excellent interval improvement with only a very small residual verruca noted.       The verrucous lesion to the plantar left heel was debrided of callus tissue with a sterile #15 blade.  The lesion was then chemically ablated with application of Cantharone.  It was then covered with a dry sterile dressing and is maintained for next 48 hours.      Recommend follow-up in 4 weeks.      History of Present Illness     Darlin Garcia is a 69 y.o. female   The patient presents today for follow-up of bilateral warts to her feet.  She notes good interval improvement to both the lesions.  She has not been having any pain or discomfort associated with the skin lesions to either foot.      Review of Systems   Constitutional: Negative.    HENT: Negative.     Eyes: Negative.    Respiratory: Negative.     Cardiovascular: Negative.    Endocrine: Negative.    Musculoskeletal: Negative.    Neurological: Negative.    Hematological: Negative.    Psychiatric/Behavioral: Negative.       Objective   Pulse 100   Ht 5' 1\" (1.549 m)   Wt 88.9 kg (196 lb)   SpO2 97%   BMI 37.03 kg/m²      Physical Exam  Vitals and nursing note reviewed.   Constitutional:       General: She is not in acute distress.     Appearance: She is well-developed.   HENT:      Head: Normocephalic and atraumatic.   Eyes:      Conjunctiva/sclera: Conjunctivae normal.   Cardiovascular:      Rate and Rhythm: Normal rate and regular rhythm.      Pulses:           Dorsalis pedis pulses are 2+ on the right side and 2+ on the left side.        Posterior tibial pulses are 2+ on the right side and 2+ on the left side.      Heart sounds: No murmur heard.  Pulmonary:      Effort: Pulmonary effort is normal. No respiratory distress.      Breath sounds: Normal breath " "sounds.   Abdominal:      Palpations: Abdomen is soft.      Tenderness: There is no abdominal tenderness.   Musculoskeletal:         General: No swelling.      Cervical back: Neck supple.        Feet:    Feet:      Comments: The patient's clinical examination today significant for a resolved verrucous lesion to the distal lateral border of the right great toenail.  The left heel lesion shows excellent interval improvement with only a very small residual verruca noted.   Skin:     General: Skin is warm and dry.      Capillary Refill: Capillary refill takes less than 2 seconds.   Neurological:      Mental Status: She is alert.   Psychiatric:         Mood and Affect: Mood normal.     Lesion Destruction    Date/Time: 2/19/2025 1:30 PM    Performed by: Gideon Maldonado DPM  Authorized by: Gideon Maldonado DPM  [place] Protocol:  procedure performed by consultantConsent: Verbal consent obtained.  Risks and benefits: risks, benefits and alternatives were discussed  Consent given by: patient  Time out: Immediately prior to procedure a \"time out\" was called to verify the correct patient, procedure, equipment, support staff and site/side marked as required.  Timeout called at: 2/19/2025 1:35 PM.  Patient understanding: patient states understanding of the procedure being performed  Patient identity confirmed: verbally with patient and provided demographic data    Procedure Details - Lesion Destruction:     Number of Lesions:  1  Lesion 1:     Body area:  Lower extremity    Lower extremity location:  L foot    Malignancy: benign lesion      Destruction method: chemical removal            "

## 2025-02-23 DIAGNOSIS — E78.5 HYPERLIPIDEMIA, UNSPECIFIED HYPERLIPIDEMIA TYPE: ICD-10-CM

## 2025-02-24 RX ORDER — ATORVASTATIN CALCIUM 20 MG/1
20 TABLET, FILM COATED ORAL
Qty: 90 TABLET | Refills: 1 | Status: SHIPPED | OUTPATIENT
Start: 2025-02-24

## 2025-03-18 ENCOUNTER — OFFICE VISIT (OUTPATIENT)
Dept: FAMILY MEDICINE CLINIC | Facility: CLINIC | Age: 70
End: 2025-03-18
Payer: COMMERCIAL

## 2025-03-18 VITALS
WEIGHT: 197 LBS | HEART RATE: 96 BPM | SYSTOLIC BLOOD PRESSURE: 138 MMHG | OXYGEN SATURATION: 98 % | RESPIRATION RATE: 20 BRPM | HEIGHT: 61 IN | DIASTOLIC BLOOD PRESSURE: 90 MMHG | TEMPERATURE: 97.1 F | BODY MASS INDEX: 37.19 KG/M2

## 2025-03-18 DIAGNOSIS — J06.9 UPPER RESPIRATORY TRACT INFECTION, UNSPECIFIED TYPE: Primary | ICD-10-CM

## 2025-03-18 PROCEDURE — 99213 OFFICE O/P EST LOW 20 MIN: CPT | Performed by: FAMILY MEDICINE

## 2025-03-18 RX ORDER — AZITHROMYCIN 250 MG/1
TABLET, FILM COATED ORAL
Qty: 6 TABLET | Refills: 0 | Status: SHIPPED | OUTPATIENT
Start: 2025-03-18 | End: 2025-03-22

## 2025-03-18 RX ORDER — BENZONATATE 200 MG/1
200 CAPSULE ORAL 3 TIMES DAILY PRN
Qty: 20 CAPSULE | Refills: 0 | Status: SHIPPED | OUTPATIENT
Start: 2025-03-18

## 2025-03-18 NOTE — PROGRESS NOTES
Name: Darlin Garcia      : 1955      MRN: [id number]  Encounter Provider: Radha Hernandez MD  Encounter Date: 3/18/2025   Encounter department: [facility]    Assessment & Plan  Upper respiratory tract infection, unspecified type    Orders:  •  azithromycin (ZITHROMAX) 250 mg tablet; Take 2 tablets today then 1 tablet daily x 4 days  •  benzonatate (TESSALON) 200 MG capsule; Take 1 capsule (200 mg total) by mouth 3 (three) times a day as needed for cough         History of Present Illness     URI   This is a new problem. Episode onset: 5 days ago. The problem has been unchanged. There has been no fever. Associated symptoms include congestion, coughing, a plugged ear sensation, rhinorrhea, a sore throat and swollen glands. Pertinent negatives include no abdominal pain, chest pain, diarrhea, dysuria, ear pain, headaches, joint pain, joint swelling, nausea, neck pain, rash, sinus pain, sneezing, vomiting or wheezing. She has tried nothing for the symptoms. The treatment provided no relief.     Review of Systems   Constitutional:  Negative for chills and fever.   HENT:  Positive for congestion, rhinorrhea and sore throat. Negative for ear pain, sinus pain and sneezing.    Respiratory:  Positive for cough. Negative for wheezing.    Cardiovascular:  Negative for chest pain.   Gastrointestinal:  Negative for abdominal pain, diarrhea, nausea and vomiting.   Genitourinary:  Negative for dysuria.   Musculoskeletal:  Negative for joint pain and neck pain.   Skin:  Negative for rash.   Neurological:  Negative for headaches.     Past Medical History:   Diagnosis Date   • Arthritis    • Arthropathy     ONSET: 2011   • Cellulitis     ONSET: 2009   • Colon polyp    • Costochondritis     ONSET: 2005   • Dermatitis     ONSET: 2005   • Diabetes mellitus (HCC)     borderline   • Hemorrhagic detachment of retinal pigment epithelium     ONSET: 2009   • Hx of vertigo     one year  "ago 2020-treated-no further episodes   • Hypertension     LAST ASSESSED: 02JUN2014   • Ingrown nail     LAST ASSESSED: 04MAY2010   • Labyrinthitis     ONSET: 30NOV2007   • Lymphadenopathy     ONSET: 10OCT2007   • Myalgia     ONSET: 23MAY2005   • Myositis     ONSET: 23MAY2005   • Nonallopathic lesion     ONSET: [id number]   • Shortness of breath     ONSET: 30SEP2008-with exertion-had cardiac workup with stress test-was \"good\" per patient  2/23/21  DS   • Systemic lupus erythematosus (HCC)     LAST ASSESSED: 02JUN2014   • Tinea corporis     ONSET: 22JUL2011   • Vertigo     LAST ASSESSED: 15FEB2013     Past Surgical History:   Procedure Laterality Date   • COLONOSCOPY     • EGD     • FINGER SURGERY      left index     Family History   Problem Relation Age of Onset   • Stomach cancer Mother         MALIGNANT NEOPLASM   • Hypertension Father    • Coronary artery disease Sister    • Diabetes Sister    • No Known Problems Daughter    • No Known Problems Maternal Grandmother    • No Known Problems Maternal Grandfather    • No Known Problems Paternal Grandmother    • No Known Problems Paternal Grandfather    • Coronary artery disease Brother    • Other Brother         CARDIAC PACEMAKER, CARDIOMEGALY   • No Known Problems Brother    • No Known Problems Maternal Aunt    • No Known Problems Maternal Aunt    • No Known Problems Maternal Aunt    • No Known Problems Paternal Aunt    • No Known Problems Paternal Aunt      Social History     Tobacco Use   • Smoking status: Never     Passive exposure: Never   • Smokeless tobacco: Never   Vaping Use   • Vaping status: Never Used   Substance and Sexual Activity   • Alcohol use: Yes     Comment: rare   • Drug use: No   • Sexual activity: Not Currently     Current Outpatient Medications on File Prior to Visit   Medication Sig   • albuterol (PROVENTIL HFA,VENTOLIN HFA) 90 mcg/act inhaler Inhale 2 puffs every 6 (six) hours as needed for wheezing   • aspirin (ECOTRIN LOW STRENGTH) 81 mg EC " "tablet Take 81 mg by mouth daily   • atorvastatin (LIPITOR) 20 mg tablet TAKE 1 TABLET BY MOUTH EVERY DAY WITH DINNER   • Cholecalciferol (VITAMIN D3) 3000 units TABS Take 1,000 Units by mouth 2 (two) times a day     • Coenzyme Q10 (COQ10) 200 MG CAPS Take 1 capsule by mouth daily   • ezetimibe (ZETIA) 10 mg tablet TAKE 1 TABLET BY MOUTH EVERY DAY   • hydroxychloroquine (PLAQUENIL) 200 mg tablet 2 (two) times a day with meals   • lisinopril (ZESTRIL) 40 mg tablet Take 1 tablet (40 mg total) by mouth daily   • LORazepam (ATIVAN) 1 mg tablet TAKE 1 TABLET BY MOUTH ONE HOUR PRIOR TO APPOINTMENT AFTER CHECK IN MY REPEAT ONE TIME   • metoprolol tartrate (LOPRESSOR) 25 mg tablet TAKE 0.5 TABLETS (12.5 MG TOTAL) BY MOUTH EVERY 12 (TWELVE) HOURS   • Omega-3 Fatty Acids (fish oil) 1,000 mg Take 2 capsules (2,000 mg total) by mouth 2 (two) times a day   • semaglutide, 0.25 or 0.5 mg/dose, (Ozempic, 0.25 or 0.5 MG/DOSE,) 2 mg/3 mL injection pen INJECT 0.75 ML (0.5 MG TOTAL) UNDER THE SKIN EVERY 7 DAYS   • semaglutide, 1 mg/dose, (Ozempic) 4 mg/3 mL injection pen Inject 0.75 mL (1 mg total) under the skin once a week     Allergies   Allergen Reactions   • Other      Reaction Date: 25Jan2005; Annotation - [id number]: rash...madlpn adhesive tape   • Penicillins Rash     Reaction Date: 16Feb2004; Annotation - [id number]: jjw     Immunization History   Administered Date(s) Administered   • COVID-19 PFIZER VACCINE 0.3 ML IM 03/20/2021, 04/10/2021, 01/19/2022   • Influenza Quadrivalent Preservative Free 3 years and older IM 09/25/2014   • Influenza, high dose seasonal 0.7 mL 12/22/2022   • Influenza, seasonal, injectable 09/05/2009, 09/23/2010, 10/01/2011, 09/26/2013, 09/20/2017   • Td (adult), adsorbed 07/14/1997   • Tdap 05/26/2016     Objective   /90   Pulse 96   Temp (!) 97.1 °F (36.2 °C) (Temporal)   Resp 20   Ht 5' 1\" (1.549 m)   Wt 89.4 kg (197 lb)   SpO2 98%   BMI 37.22 kg/m²     Physical Exam  Constitutional:      "  General: She is not in acute distress.     Appearance: She is obese. She is not ill-appearing.   HENT:      Nose: Congestion present. No rhinorrhea.      Mouth/Throat:      Pharynx: Posterior oropharyngeal erythema present. No oropharyngeal exudate.   Cardiovascular:      Rate and Rhythm: Normal rate.   Pulmonary:      Effort: Pulmonary effort is normal. No respiratory distress.      Breath sounds: No wheezing, rhonchi or rales.   Neurological:      Mental Status: She is alert.

## 2025-03-19 ENCOUNTER — OFFICE VISIT (OUTPATIENT)
Dept: PODIATRY | Facility: CLINIC | Age: 70
End: 2025-03-19
Payer: COMMERCIAL

## 2025-03-19 VITALS — HEART RATE: 87 BPM | OXYGEN SATURATION: 99 % | BODY MASS INDEX: 37.38 KG/M2 | WEIGHT: 198 LBS | HEIGHT: 61 IN

## 2025-03-19 DIAGNOSIS — B07.0 PLANTAR WART: Primary | ICD-10-CM

## 2025-03-19 PROCEDURE — 99212 OFFICE O/P EST SF 10 MIN: CPT | Performed by: PODIATRIST

## 2025-03-19 NOTE — PROGRESS NOTES
":  Assessment & Plan  Plantar wart           The patient's clinical examination today significant for resolved verruca to the plantar lateral aspect of the left heel.  There is no tenderness palpation with lateral squeeze.  Return of skin lines noted.    The patient is doing well from a clinical standpoint.  The verruca has resolved.  She can monitor for any signs of recurrence.  Otherwise she can follow-up with me on an as-needed basis.      History of Present Illness     Darlni Garcia is a 69 y.o. female   The patient presents today for follow-up of a plantar verruca to the plantar aspect of her left heel.  She has been doing well since her last visit and notes no pain or discomfort.      PAST MEDICAL HISTORY:  Past Medical History:   Diagnosis Date    Arthritis     Arthropathy     ONSET: 09FEB2011    Cellulitis     ONSET: [id number]    Colon polyp     Costochondritis     ONSET: 23MAY2005    Dermatitis     ONSET: 25JAN2005    Diabetes mellitus (HCC)     borderline    Hemorrhagic detachment of retinal pigment epithelium     ONSET: [id number]    Hx of vertigo     one year ago 2020-treated-no further episodes    Hypertension     LAST ASSESSED: 02JUN2014    Ingrown nail     LAST ASSESSED: [id number]    Labyrinthitis     ONSET: 30NOV2007    Lymphadenopathy     ONSET: 10OCT2007    Myalgia     ONSET: [id number]    Myositis     ONSET: 62JAN4365    Nonallopathic lesion     ONSET: 44KVJ7848    Shortness of breath     ONSET: 30SEP2008-with exertion-had cardiac workup with stress test-was \"good\" per patient  2/23/21  DS    Systemic lupus erythematosus (HCC)     LAST ASSESSED: 02JUN2014    Tinea corporis     ONSET: [id number]    Vertigo     LAST ASSESSED: [id number]       PAST SURGICAL HISTORY:  Past Surgical History:   Procedure Laterality Date    COLONOSCOPY      EGD      FINGER SURGERY      left index        ALLERGIES:  Other and Penicillins    MEDICATIONS:  Current Outpatient Medications   Medication Sig Dispense Refill    " albuterol (PROVENTIL HFA,VENTOLIN HFA) 90 mcg/act inhaler Inhale 2 puffs every 6 (six) hours as needed for wheezing 8 g 0    aspirin (ECOTRIN LOW STRENGTH) 81 mg EC tablet Take 81 mg by mouth daily      atorvastatin (LIPITOR) 20 mg tablet TAKE 1 TABLET BY MOUTH EVERY DAY WITH DINNER 90 tablet 1    azithromycin (ZITHROMAX) 250 mg tablet Take 2 tablets today then 1 tablet daily x 4 days 6 tablet 0    benzonatate (TESSALON) 200 MG capsule Take 1 capsule (200 mg total) by mouth 3 (three) times a day as needed for cough 20 capsule 0    Cholecalciferol (VITAMIN D3) 3000 units TABS Take 1,000 Units by mouth 2 (two) times a day        Coenzyme Q10 (COQ10) 200 MG CAPS Take 1 capsule by mouth daily      ezetimibe (ZETIA) 10 mg tablet TAKE 1 TABLET BY MOUTH EVERY DAY 90 tablet 0    hydroxychloroquine (PLAQUENIL) 200 mg tablet 2 (two) times a day with meals      lisinopril (ZESTRIL) 40 mg tablet Take 1 tablet (40 mg total) by mouth daily 90 tablet 2    LORazepam (ATIVAN) 1 mg tablet TAKE 1 TABLET BY MOUTH ONE HOUR PRIOR TO APPOINTMENT AFTER CHECK IN MY REPEAT ONE TIME      metoprolol tartrate (LOPRESSOR) 25 mg tablet TAKE 0.5 TABLETS (12.5 MG TOTAL) BY MOUTH EVERY 12 (TWELVE) HOURS 90 tablet 1    Omega-3 Fatty Acids (fish oil) 1,000 mg Take 2 capsules (2,000 mg total) by mouth 2 (two) times a day 120 capsule 0    semaglutide, 0.25 or 0.5 mg/dose, (Ozempic, 0.25 or 0.5 MG/DOSE,) 2 mg/3 mL injection pen INJECT 0.75 ML (0.5 MG TOTAL) UNDER THE SKIN EVERY 7 DAYS 3 mL 5    semaglutide, 1 mg/dose, (Ozempic) 4 mg/3 mL injection pen Inject 0.75 mL (1 mg total) under the skin once a week 3 mL 0     No current facility-administered medications for this visit.       SOCIAL HISTORY:  Social History     Socioeconomic History    Marital status: Single     Spouse name: None    Number of children: None    Years of education: None    Highest education level: None   Occupational History    None   Tobacco Use    Smoking status: Never     Passive  "exposure: Never    Smokeless tobacco: Never   Vaping Use    Vaping status: Never Used   Substance and Sexual Activity    Alcohol use: Yes     Comment: rare    Drug use: No    Sexual activity: Not Currently   Other Topics Concern    None   Social History Narrative    DAILY COLA CONSUMPTION (1 CANS/DAY)     Social Drivers of Health     Financial Resource Strain: Not on file   Food Insecurity: Not on file   Transportation Needs: Not on file   Physical Activity: Not on file   Stress: Not on file   Social Connections: Not on file   Intimate Partner Violence: Not on file   Housing Stability: Not on file      Review of Systems   Constitutional: Negative.    HENT: Negative.     Eyes: Negative.    Respiratory: Negative.     Cardiovascular: Negative.    Endocrine: Negative.    Musculoskeletal: Negative.    Neurological: Negative.    Hematological: Negative.    Psychiatric/Behavioral: Negative.       Objective   Pulse 87   Ht 5' 1\" (1.549 m)   Wt 89.8 kg (198 lb)   SpO2 99%   BMI 37.41 kg/m²      Physical Exam  Vitals and nursing note reviewed.   Constitutional:       General: She is not in acute distress.     Appearance: She is well-developed.   HENT:      Head: Normocephalic and atraumatic.   Cardiovascular:      Pulses:           Dorsalis pedis pulses are 2+ on the left side.        Posterior tibial pulses are 2+ on the left side.   Pulmonary:      Effort: Pulmonary effort is normal.   Abdominal:      Palpations: Abdomen is soft.   Musculoskeletal:        Feet:    Feet:      Comments: The patient's clinical examination today significant for resolved verruca to the plantar lateral aspect of the left heel.  There is no tenderness palpation with lateral squeeze.  Return of skin lines noted.  Skin:     General: Skin is warm and dry.      Capillary Refill: Capillary refill takes less than 2 seconds.   Neurological:      General: No focal deficit present.      Mental Status: She is alert and oriented to person, place, and " time.   Psychiatric:         Mood and Affect: Mood normal.

## 2025-04-18 DIAGNOSIS — E11.9 TYPE 2 DIABETES MELLITUS WITHOUT COMPLICATION, WITHOUT LONG-TERM CURRENT USE OF INSULIN (HCC): ICD-10-CM

## 2025-04-18 RX ORDER — SEMAGLUTIDE 0.68 MG/ML
INJECTION, SOLUTION SUBCUTANEOUS
Qty: 3 ML | Refills: 5 | Status: SHIPPED | OUTPATIENT
Start: 2025-04-18

## 2025-04-24 ENCOUNTER — OFFICE VISIT (OUTPATIENT)
Dept: FAMILY MEDICINE CLINIC | Facility: CLINIC | Age: 70
End: 2025-04-24
Payer: COMMERCIAL

## 2025-04-24 VITALS
DIASTOLIC BLOOD PRESSURE: 80 MMHG | BODY MASS INDEX: 37 KG/M2 | SYSTOLIC BLOOD PRESSURE: 140 MMHG | HEIGHT: 61 IN | WEIGHT: 196 LBS | RESPIRATION RATE: 14 BRPM | OXYGEN SATURATION: 98 % | TEMPERATURE: 97.6 F | HEART RATE: 92 BPM

## 2025-04-24 DIAGNOSIS — R05.1 ACUTE COUGH: ICD-10-CM

## 2025-04-24 DIAGNOSIS — E78.2 MIXED HYPERLIPIDEMIA: ICD-10-CM

## 2025-04-24 DIAGNOSIS — E11.00 TYPE 2 DIABETES MELLITUS WITH HYPEROSMOLARITY WITHOUT COMA, WITHOUT LONG-TERM CURRENT USE OF INSULIN (HCC): ICD-10-CM

## 2025-04-24 DIAGNOSIS — I10 BENIGN ESSENTIAL HYPERTENSION: ICD-10-CM

## 2025-04-24 DIAGNOSIS — J06.9 ACUTE UPPER RESPIRATORY INFECTION: Primary | ICD-10-CM

## 2025-04-24 DIAGNOSIS — J06.9 UPPER RESPIRATORY TRACT INFECTION, UNSPECIFIED TYPE: ICD-10-CM

## 2025-04-24 PROCEDURE — 99214 OFFICE O/P EST MOD 30 MIN: CPT

## 2025-04-24 RX ORDER — ALBUTEROL SULFATE 90 UG/1
2 INHALANT RESPIRATORY (INHALATION) EVERY 6 HOURS PRN
Qty: 8 G | Refills: 0 | Status: SHIPPED | OUTPATIENT
Start: 2025-04-24

## 2025-04-24 RX ORDER — METHYLPREDNISOLONE 4 MG/1
TABLET ORAL
Qty: 21 EACH | Refills: 0 | Status: SHIPPED | OUTPATIENT
Start: 2025-04-24

## 2025-04-24 RX ORDER — BENZONATATE 200 MG/1
200 CAPSULE ORAL 3 TIMES DAILY PRN
Qty: 20 CAPSULE | Refills: 0 | Status: SHIPPED | OUTPATIENT
Start: 2025-04-24

## 2025-04-24 RX ORDER — AZITHROMYCIN 250 MG/1
TABLET, FILM COATED ORAL
Qty: 6 TABLET | Refills: 0 | Status: SHIPPED | OUTPATIENT
Start: 2025-04-24 | End: 2025-04-29

## 2025-04-24 NOTE — PROGRESS NOTES
Name: Darlin Garcia      : 1955      MRN: [id number]  Encounter Provider: JOSUE Yanez  Encounter Date: 2025   Encounter department: [facility]  :  Assessment & Plan  Acute upper respiratory infection  Hydrate  Increase your protein intake  Over the symptom management such as tylenol cold and flu  Orders:    azithromycin (Zithromax) 250 mg tablet; Take 2 tablets (500 mg total) by mouth daily for 1 day, THEN 1 tablet (250 mg total) daily for 4 days.    methylPREDNISolone 4 MG tablet therapy pack; Use as directed on package  no covid testing at this time as we have no tests available  If symptoms worsen or progress follow up on Monday  Acute cough    Orders:    albuterol (PROVENTIL HFA,VENTOLIN HFA) 90 mcg/act inhaler; Inhale 2 puffs every 6 (six) hours as needed for wheezing    azithromycin (Zithromax) 250 mg tablet; Take 2 tablets (500 mg total) by mouth daily for 1 day, THEN 1 tablet (250 mg total) daily for 4 days.    methylPREDNISolone 4 MG tablet therapy pack; Use as directed on package    Benign essential hypertension  Stable on current regimen  Lisinopril 40 mg da8ily  Metoprolol 25 mg 2 times a day           Type 2 diabetes mellitus with hyperosmolarity without coma, without long-term current use of insulin ([facility])    Lab Results   Component Value Date    HGBA1C 5.5 2025   Stable on current regimen  ozempic   mg weekly injections           Mixed hyperlipidemia  Stable  Atorvastatin          Upper respiratory tract infection, unspecified type    Orders:    benzonatate (TESSALON) 200 MG capsule; Take 1 capsule (200 mg total) by mouth 3 (three) times a day as needed for cough         History of Present Illness   Here today not feeling well yesterday she has increased cough, earache on the left. She had one episode of vomiting today once at work and then again when she got home from work she feels like it was a coughing jag that brought on the vomiting. Notes  some muscle tenderness in her chest wall    Denies fever  Denies shortness of breath  Denies cp    Cough  This is a new problem. The current episode started yesterday. The problem has been gradually worsening. The problem occurs every few minutes. The cough is Productive of sputum. Associated symptoms include chills, ear pain, rhinorrhea and a sore throat. Pertinent negatives include no chest pain, fever, headaches, postnasal drip, shortness of breath or wheezing. The symptoms are aggravated by lying down and other. She has tried OTC cough suppressant, rest and body position changes for the symptoms. The treatment provided no relief. Her past medical history is significant for bronchitis. There is no history of COPD, emphysema, environmental allergies or pneumonia.   Earache   There is pain in the left ear. This is a new problem. The current episode started today. The problem occurs every few minutes. There has been no fever. Associated symptoms include coughing, rhinorrhea, a sore throat and vomiting (2 episodes). Pertinent negatives include no abdominal pain, diarrhea or headaches. She has tried NSAIDs and acetaminophen for the symptoms. The treatment provided no relief. There is no history of a chronic ear infection, hearing loss or a tympanostomy tube.     Review of Systems   Constitutional:  Positive for appetite change, chills and fatigue. Negative for activity change, fever and unexpected weight change.   HENT:  Positive for ear pain, rhinorrhea and sore throat. Negative for congestion, postnasal drip and voice change.    Eyes: Negative.    Respiratory:  Positive for cough. Negative for chest tightness, shortness of breath and wheezing.    Cardiovascular:  Negative for chest pain and palpitations.   Gastrointestinal:  Positive for vomiting (2 episodes). Negative for abdominal pain, diarrhea and nausea.   Endocrine: Negative.    Genitourinary: Negative.  Negative for dysuria, frequency, hematuria and urgency.  "  Musculoskeletal:  Positive for arthralgias and back pain. Negative for gait problem.   Skin:  Positive for pallor.   Allergic/Immunologic: Negative for environmental allergies.   Neurological:  Negative for dizziness, weakness, light-headedness and headaches.   Hematological: Negative.    Psychiatric/Behavioral: Negative.         Objective   /80 (BP Location: Left arm, Patient Position: Sitting, Cuff Size: Adult)   Pulse 92   Temp 97.6 °F (36.4 °C) (Temporal)   Resp 14   Ht 5' 1\" (1.549 m)   Wt 88.9 kg (196 lb)   SpO2 98%   BMI 37.03 kg/m²      Physical Exam  Constitutional:       Appearance: Normal appearance. She is ill-appearing.   HENT:      Head: Normocephalic.      Right Ear: Tympanic membrane, ear canal and external ear normal.      Left Ear: Tympanic membrane, ear canal and external ear normal.      Nose: Rhinorrhea present.      Mouth/Throat:      Mouth: Mucous membranes are moist.      Pharynx: Oropharynx is clear. Posterior oropharyngeal erythema present.   Eyes:      Conjunctiva/sclera: Conjunctivae normal.   Cardiovascular:      Rate and Rhythm: Normal rate and regular rhythm.      Pulses: Normal pulses.      Heart sounds: Normal heart sounds.   Pulmonary:      Effort: Pulmonary effort is normal.      Breath sounds: Wheezing present.      Comments: tightness  Abdominal:      Palpations: Abdomen is soft.   Musculoskeletal:         General: No tenderness. Normal range of motion.      Cervical back: Normal range of motion and neck supple.      Right lower leg: No edema.      Left lower leg: No edema.   Skin:     General: Skin is warm and dry.      Capillary Refill: Capillary refill takes less than 2 seconds.      Coloration: Skin is pale.   Neurological:      General: No focal deficit present.      Mental Status: She is alert and oriented to person, place, and time. Mental status is at baseline.      Motor: No weakness.      Gait: Gait normal.   Psychiatric:         Mood and Affect: Mood " normal.         Behavior: Behavior normal.         Thought Content: Thought content normal.         Judgment: Judgment normal.

## 2025-04-24 NOTE — ASSESSMENT & PLAN NOTE
Lab Results   Component Value Date    HGBA1C 5.5 01/17/2025   Stable on current regimen  ozempic   mg weekly injections

## 2025-04-24 NOTE — LETTER
April 24, 2025     Patient: Darlin Garcia  YOB: 1955  Date of Visit: 4/24/2025      To Whom it May Concern:    Darlin Garcia is under my professional care. Darlin was seen in my office on 4/24/2025.excuse from work 4/24-4/25 Darlin may return to work on 4/28/2025 .    If you have any questions or concerns, please don't hesitate to call.         Sincerely,          JOSUE Yanez        CC: No Recipients

## 2025-04-29 ENCOUNTER — OFFICE VISIT (OUTPATIENT)
Dept: SLEEP CENTER | Facility: CLINIC | Age: 70
End: 2025-04-29
Attending: FAMILY MEDICINE
Payer: COMMERCIAL

## 2025-04-29 VITALS
BODY MASS INDEX: 37 KG/M2 | OXYGEN SATURATION: 96 % | WEIGHT: 196 LBS | HEIGHT: 61 IN | SYSTOLIC BLOOD PRESSURE: 120 MMHG | HEART RATE: 83 BPM | DIASTOLIC BLOOD PRESSURE: 78 MMHG

## 2025-04-29 DIAGNOSIS — I10 BENIGN ESSENTIAL HYPERTENSION: ICD-10-CM

## 2025-04-29 DIAGNOSIS — E11.00 TYPE 2 DIABETES MELLITUS WITH HYPEROSMOLARITY WITHOUT COMA, WITHOUT LONG-TERM CURRENT USE OF INSULIN (HCC): ICD-10-CM

## 2025-04-29 DIAGNOSIS — R06.83 SNORING: Primary | ICD-10-CM

## 2025-04-29 PROCEDURE — 99204 OFFICE O/P NEW MOD 45 MIN: CPT | Performed by: STUDENT IN AN ORGANIZED HEALTH CARE EDUCATION/TRAINING PROGRAM

## 2025-04-29 NOTE — PATIENT INSTRUCTIONS
"- A sleep study was ordered for you. It can be an in lab sleep study or a portable at home sleep study. It depends on what insurance approves.  Some insurances will only cover home sleep studies unless you have significant medical conditions like stroke or heart attack within the last 6 months, severe COPD, or the use of supplemental oxygen.    - The order goes electronically to the sleep center staff.    - The sleep center will get approval from your insurance and then will call you to schedule the sleep study.    - If you do not hear from the sleep center in 1 week, please call us to check the status of your order.    - There are different locations to get sleep study done.    - Please make sure you know what is the copay associated with your sleep study. Approval does not mean coverage.     - Once your sleep study is done, it can take up to 2 weeks to get results (depending on the volume).    - A follow up appointment to discuss sleep study results is required in most cases. On that visit, we will discuss results and treatment options.    - If your sleep study shows severe sleep apnea please expect contact from the sleep center. We will try to expedite your follow up appointment.    - The best way to communicate with us in through [facility] My [facility]. Make sure your account is active.    - Once you start CPAP therapy, it is mandatory by insurance, to have a follow up appointment with us within 31-90 days of getting CPAP.     Patient Education     Sleep apnea in adults   The Basics   Written by the doctors and editors at [facility]   What is sleep apnea? -- Sleep apnea is a condition that makes you stop breathing for short periods while you are asleep. There are 2 types of sleep apnea. One is called \"obstructive sleep apnea.\" The other is called \"central sleep apnea.\"  In obstructive sleep apnea, you stop breathing because your throat narrows or closes (figure 1). In central sleep apnea, you stop breathing because your " "brain does not send the right signals to your muscles to make you breathe. When people talk about sleep apnea, they are usually referring to obstructive sleep apnea, which is what this article is about.  People with sleep apnea do not know that they stop breathing when they are asleep. But they do sometimes wake up startled or gasping for breath. They also often hear from loved ones that they snore.  What are the symptoms of sleep apnea? -- The main symptoms of sleep apnea are loud snoring, tiredness, and daytime sleepiness. Other symptoms can include:   Restless sleep   Waking up choking or gasping   Morning headaches, dry mouth, or sore throat   Waking up often to urinate   Waking up feeling unrested or groggy   Trouble thinking clearly or remembering things  Some people with sleep apnea don't have symptoms, or don't realize that they have them.  Should I see a doctor or nurse? -- Yes. If you think that you might have sleep apnea, see your doctor.  Is there a test for sleep apnea? -- Yes. First, your doctor or nurse will ask about your symptoms. If you have a bed partner, they might also ask that person if you snore or gasp in your sleep. If the doctor or nurse suspects that you have sleep apnea, they might send you for a \"sleep study.\"  Sleep studies can sometimes be done at home, but they are usually done in a sleep lab. For the study, you spend the night in the lab, and you are hooked up to different machines that monitor your heart rate, breathing, and other body functions. The results of the test tell your doctor or nurse if you have the disorder.  Is there anything I can do on my own to help my sleep apnea? -- Yes. Some things that might help:   Try to avoid sleeping on your back, if possible. This might help some people.   Lose weight, if you have excess body weight.   Get regular physical activity. This might help you lose weight. But even if it doesn't, being active is good for your health.   Avoid " "alcohol, especially in the evening. Alcohol can make sleep apnea worse.  How is sleep apnea treated? -- Treatment can include:   Weight loss - As mentioned above, weight loss can help if you have excess weight or obesity. But losing weight can be challenging, and it takes time to lose enough weight to help with your sleep apnea. Most people need other treatment while they work on losing weight.   CPAP - The most effective treatment for sleep apnea is a device that keeps your airway open while you sleep. Treatment with this device is called \"continuous positive airway pressure\" (\"CPAP\"). People getting CPAP wear a face mask at night that keeps them breathing (figure 2).  If your doctor or nurse recommends a CPAP machine, be patient about using it. The mask might seem uncomfortable to wear at first, and the machine might seem noisy, but using the machine can really help you. People with sleep apnea who use a CPAP machine feel more rested and generally feel better.  If CPAP does not work, your doctor might suggest other treatment. Options might include:   An oral device - This is a device that you wear in your mouth. It is called an \"oral appliance\" or \"mandibular advancement device.\" It helps keep your airway open while you sleep.   Hypoglossal nerve stimulation - This involves a procedure to implant a small device into your chest. The device has a wire that connects to the nerve under your tongue. While you are sleeping, it sends an electrical signal that causes the tongue to push forward. This helps open up your airway.   Surgery to widen your airway - This might involve removing your tonsils or other tissue that blocks the airway.  Is sleep apnea dangerous? -- It can be. Risks include:   Accidents - People with sleep apnea do not get good-quality sleep, so they are often tired and not alert. This puts them at risk for car accidents and other types of accidents.   Other health problems - Studies show that people " with sleep apnea are more likely than others to have high blood pressure, heart attacks, and other serious heart problems. Some people also have mood changes or depression.  In people with severe sleep apnea, getting treated (for example, with CPAP) can help lower these risks.  All topics are updated as new evidence becomes available and our peer review process is complete.  This topic retrieved from [employer] on: Feb 28, 2024.  Topic 50041 Version 18.0  Release: 32.2.4 - C32.58  © 2024 UpToDate, Inc. and/or its affiliates. All rights reserved.  figure 1: Airway in a person with sleep apnea     Normally, when a person sleeps, the airway remains open, and air can pass from the nose and mouth to the lungs. In a person with sleep apnea, parts of the throat and mouth drop into the airway and block off the flow of air. This can cause loud snoring and interrupt breathing for short periods.  Graphic 04023 Version 6.0  figure 2: Continuous positive airway pressure (CPAP) for sleep apnea     The CPAP mask gently blows air into your nose while you sleep. It puts just enough pressure on your airway to keep it from closing. The mask in this picture fits over just the nose. Other CPAP devices have masks that fit over the nose and mouth.  Graphic 61467 Version 5.0  Consumer Information Use and Disclaimer   Disclaimer: This generalized information is a limited summary of diagnosis, treatment, and/or medication information. It is not meant to be comprehensive and should be used as a tool to help the user understand and/or assess potential diagnostic and treatment options. It does NOT include all information about conditions, treatments, medications, side effects, or risks that may apply to a specific patient. It is not intended to be medical advice or a substitute for the medical advice, diagnosis, or treatment of a health care provider based on the health care provider's examination and assessment of a patient's specific and unique  circumstances. Patients must speak with a health care provider for complete information about their health, medical questions, and treatment options, including any risks or benefits regarding use of medications. This information does not endorse any treatments or medications as safe, effective, or approved for treating a specific patient. UpToDate, Inc. and its affiliates disclaim any warranty or liability relating to this information or the use thereof.The use of this information is governed by the Terms of Use, available at https://www.woltersFootnoteuwer.com/en/know/clinical-effectiveness-terms. 2024© UpToDate, Inc. and its affiliates and/or licensors. All rights reserved.  Copyright   © 2024 UpToDate, Inc. and/or its affiliates. All rights reserved.

## 2025-04-29 NOTE — ASSESSMENT & PLAN NOTE
Hypertension - We reviewed the association between untreated obstructive sleep apnea and the increased risk for hypertension. Patient to continue on prescribed anti-hypertensive therapy and follow up with PCP for continuity of care.     Orders:    Home Sleep Study; Future

## 2025-04-29 NOTE — ASSESSMENT & PLAN NOTE
With reported history of type 2 diabetes. With most recent A1C of   Lab Results   Component Value Date    HGBA1C 5.5 01/17/2025   . There is an association between sleep and glucose control and insulin sensitivity.  I have encouraged the patient to aim for at least 7 to 9 hours of sleep nightly for overall health and wellness.  For sleep testing as above.    Lab Results   Component Value Date    HGBA1C 5.5 01/17/2025       Orders:    Home Sleep Study; Future

## 2025-04-29 NOTE — PROGRESS NOTES
Name: Darlin Garcia      : 1955      MRN: [id number]  Encounter Provider: Darius Ansari MD  Encounter Date: 2025   Encounter department: [facility] SLEEP MEDICINE [facility]ED    :  Assessment & Plan  Snoring  Snoring / Concern for Obstructive Sleep Apnea - Discussed pathophysiology of HARMEET, consequences of untreated HARMEET and treatment options. - Discussed risks of sleepy driving and mitigation strategies (napping). Patient agrees to not drive if tired or sleepy. - Advised avoidance of alcohol and centrally acting medications as these can worsen HARMEET.  - Darlin presents today for new patient evaluation of snoring, gasping, witnessed apnea, unrefreshing sleep quality, airway crowding on exam.  Their collective clinical signs and symptoms may be suggestive of undiagnosed sleep disordered breathing.  Home sleep study order has been placed today in the office.  - I have asked the patient to return to the office once sleep testing is completed to review study results and discuss treatment options.  Patient verbalized understanding and stated that they would do so.    Orders:    Ambulatory Referral to Sleep Medicine    Home Sleep Study; Future    Benign essential hypertension  Hypertension - We reviewed the association between untreated obstructive sleep apnea and the increased risk for hypertension. Patient to continue on prescribed anti-hypertensive therapy and follow up with PCP for continuity of care.     Orders:    Home Sleep Study; Future    Type 2 diabetes mellitus with hyperosmolarity without coma, without long-term current use of insulin (HCC)  With reported history of type 2 diabetes. With most recent A1C of   Lab Results   Component Value Date    HGBA1C 5.5 2025   . There is an association between sleep and glucose control and insulin sensitivity.  I have encouraged the patient to aim for at least 7 to 9 hours of sleep nightly for overall health and wellness.  For sleep testing as  "above.    Lab Results   Component Value Date    HGBA1C 5.5 01/17/2025       Orders:    Home Sleep Study; Future    BMI 37.0-37.9, adult  Elevated BMI - We reviewed the association of elevated BMI on obstructive sleep apnea and sleep disordered breathing. Encouraged patient to follow healthy diet, regular exercise regimen, and pursue weight loss to manage symptoms.            History of Present Illness     Pertinent positives/negatives included in HPI and also as noted:     Objective   /78   Pulse 83   Ht 5' 1\" (1.549 m)   Wt 88.9 kg (196 lb)   SpO2 96%   BMI 37.03 kg/m²     Neck Circumference: 14  [facility] New Patient Evaluation    Ms. Garcia is a 69 y.o. female with a PMH of HTN, obesity, T2DM, SLE, who presents as a new patient for evaluation of Sleep Disordered Breathing.     I have reviewed the 4/24/25 family medicine office note with JOSUE Yanez.     I have reviewed the 1/17/25 family medicine office note with Carmina Reyes MD.     History of Presenting Illness:    The patient snores. There are choking and gasping episodes. There are witnessed apneas. 1-2x episode(s) of nocturia occur per night. The patient sleeps on her side. There are no reports of nocturnal behaviors.     The patient's [place] sleepiness scale score is 7/24 (<=10 is normal). She has not been sleepy while driving. She has not had a fall-asleep motor vehicle accident. There are no reports of hypnagogic hallucinations, cataplexy or sleep paralysis.    In terms of the patient's sleep/wake cycle symptoms:  Bedtime: 7:00pm-9:00pm   SOL: Brief < 30 Minutes   Nocturnal awakenings: 1-2x, Nocturia  Wakeup time: 3:00am   Naps: Denied    Total sleep time estimate: Approximately 6-7 hours.     She has not tried any medications for poor sleep in the past.    Her legs do occasionally bother her on trying to initiate sleep.    Past Medical History:   Diagnosis Date    Arthritis     Arthropathy     ONSET: " "[id number]    Cellulitis     ONSET: [id number]    Colon polyp     Costochondritis     ONSET: 23MAY2005    Dermatitis     ONSET: 25JAN2005    Diabetes mellitus (HCC)     borderline    Hemorrhagic detachment of retinal pigment epithelium     ONSET: 10FEB2009    Hx of vertigo     one year ago 2020-treated-no further episodes    Hypertension     LAST ASSESSED: 02JUN2014    Ingrown nail     LAST ASSESSED: 04MAY2010    Labyrinthitis     ONSET: 30NOV2007    Lymphadenopathy     ONSET: 10OCT2007    Myalgia     ONSET: 23MAY2005    Myositis     ONSET: 23MAY2005    Nonallopathic lesion     ONSET: 07JUL2009    Shortness of breath     ONSET: 30SEP2008-with exertion-had cardiac workup with stress test-was \"good\" per patient  2/23/21  DS    Systemic lupus erythematosus (HCC)     LAST ASSESSED: 02JUN2014    Tinea corporis     ONSET: 22JUL2011    Vertigo     LAST ASSESSED: 15FEB2013        Past Surgical History:   Procedure Laterality Date    COLONOSCOPY      EGD      FINGER SURGERY      left index        No prior upper airway surgeries.     Allergies   Allergen Reactions    Other      Reaction Date: 25Jan2005; Annotation - [id number]: rash...madlpn adhesive tape    Penicillins Rash     Reaction Date: 16Feb2004; Annotation - [id number]: jjw        Current Outpatient Medications on File Prior to Visit   Medication Sig Dispense Refill    albuterol (PROVENTIL HFA,VENTOLIN HFA) 90 mcg/act inhaler Inhale 2 puffs every 6 (six) hours as needed for wheezing 8 g 0    aspirin (ECOTRIN LOW STRENGTH) 81 mg EC tablet Take 81 mg by mouth daily      atorvastatin (LIPITOR) 20 mg tablet TAKE 1 TABLET BY MOUTH EVERY DAY WITH DINNER 90 tablet 1    azithromycin (Zithromax) 250 mg tablet Take 2 tablets (500 mg total) by mouth daily for 1 day, THEN 1 tablet (250 mg total) daily for 4 days. 6 tablet 0    benzonatate (TESSALON) 200 MG capsule Take 1 capsule (200 mg total) by mouth 3 (three) times a day as needed for cough 20 capsule 0    Cholecalciferol " (VITAMIN D3) 3000 units TABS Take 1,000 Units by mouth 2 (two) times a day        Coenzyme Q10 (COQ10) 200 MG CAPS Take 1 capsule by mouth daily      ezetimibe (ZETIA) 10 mg tablet TAKE 1 TABLET BY MOUTH EVERY DAY 90 tablet 0    hydroxychloroquine (PLAQUENIL) 200 mg tablet 2 (two) times a day with meals      lisinopril (ZESTRIL) 40 mg tablet Take 1 tablet (40 mg total) by mouth daily 90 tablet 2    LORazepam (ATIVAN) 1 mg tablet TAKE 1 TABLET BY MOUTH ONE HOUR PRIOR TO APPOINTMENT AFTER CHECK IN MY REPEAT ONE TIME      methylPREDNISolone 4 MG tablet therapy pack Use as directed on package 21 each 0    metoprolol tartrate (LOPRESSOR) 25 mg tablet TAKE 0.5 TABLETS (12.5 MG TOTAL) BY MOUTH EVERY 12 (TWELVE) HOURS 90 tablet 1    Omega-3 Fatty Acids (fish oil) 1,000 mg Take 2 capsules (2,000 mg total) by mouth 2 (two) times a day 120 capsule 0    semaglutide, 0.25 or 0.5 mg/dose, (Ozempic, 0.25 or 0.5 MG/DOSE,) 2 mg/3 mL injection pen INJECT 0.75 ML (0.5 MG TOTAL) UNDER THE SKIN EVERY 7 DAYS 3 mL 5    semaglutide, 1 mg/dose, (Ozempic) 4 mg/3 mL injection pen Inject 0.75 mL (1 mg total) under the skin once a week (Patient not taking: Reported on 4/24/2025) 3 mL 0     No current facility-administered medications on file prior to visit.         Family History: Her family history includes Coronary artery disease in her brother and sister; Diabetes in her sister; Hypertension in her father; No Known Problems in her brother, daughter, maternal aunt, maternal aunt, maternal aunt, maternal grandfather, maternal grandmother, paternal aunt, paternal aunt, paternal grandfather, and paternal grandmother; Other in her brother; Stomach cancer in her mother.    No prior family history of sleep disorders.     Social History:   Job: Works from 5:30am - 3pm works at [employer]  Caffeine: Occasional Bry Tea, Rare Soda Daily  Alcohol: Denied  Drugs: Denied  Tobacco: Denied  Vape: Denied    Patient's medications, allergies, past medical,  "surgical, social and family histories were reviewed in the electronic medical record and updated as appropriate.    Review of Systems: On review of systems, the patient reports: No AM Headaches, denied regular nasal sinus congestion, does not wake with dry mouth and dry throat in morning.    Vitals:    04/29/25 1531   BP: 120/78   Pulse: 83   SpO2: 96%       Physical Examination:  Gen: No acute distress, not visibly anxious, speaking comfortably  H&N: MM III; no retro/micrognathia; no macroglossia; no visible thyromegaly  Neuro: Alert and oriented x3, interactive  Psych: Pleasant, normal affect  Skin: No visible rashes  Respiratory: No accessory muscle use, breathing comfortably  Cardiac: No visible edema of extremities  Abdomen: Soft, NT, nondistended  Musculoskeletal: Normal ROM Intact of Extremities    Study Results:  I reviewed the following labs:    No results found for: \"FERRITIN\"    Lab Results   Component Value Date    WBC 6.7 09/21/2024    HGB 13.5 09/21/2024    HCT 42.6 09/21/2024    MCV 88 09/21/2024     09/21/2024       This SmartLink has not been configured with any valid records.       Lab Results   Component Value Date    SODIUM 145 (H) 09/21/2024    K 4.1 09/21/2024     (H) 09/21/2024    CO2 24 09/21/2024    BUN 10 09/21/2024    CREATININE 0.72 09/21/2024    GLUC 85 09/21/2024    CALCIUM 8.3 09/04/2022       This SmartLink has not been configured with any valid records.       Lab Results   Component Value Date    XRR6PATKRIKZ 1.720 06/07/2017    TSH 2.570 07/11/2018          Results/Data:  I have reviewed the results and report from the 9/3/2022 CT head.    Independent Findings Reviewed: No acute intracranial abnormality.    I have reviewed the results and report from the 9/8/22 TTE: Left Ventricle: Left ventricular cavity size is normal. Wall thickness is normal. The left ventricular ejection fraction is 65%. Systolic function is normal. Wall motion is normal. Diastolic function is " "normal.     Independent Findings Reviewed: Normal left ventricular ejection fraction.  No wall motion abnormalities.    I answered the patient's questions to the best of my ability. We will continue with longitudinal follow-up for evaluation of sleep disordered breathing. Follow-up will be after sleep testing is completed.    Darius Ansari MD  Sleep Medicine and Internal Medicine  [facility]  04/29/25      Portions of the record may have been created with voice recognition software.  Occasional wrong word or \"sound a like\" substitutions may have occurred due to the inherent limitations of voice recognition software.  Read the chart carefully and recognize, using context, where substitutions have occurred.       "

## 2025-04-29 NOTE — LETTER
2025     Carmina Reyes MD  [address] [facility] [address]    Patient: Darlin Garcia   YOB: 1955   Date of Visit: 2025       Dear Dr. Carmina Reyes MD:    Thank you for referring Darlin Garcia to me for evaluation. Below are my notes for this consultation.    If you have questions, please do not hesitate to call me. I look forward to following your patient along with you.         Sincerely,        Darius Ansari MD        CC: No Recipients    Darius Ansari MD  2025  3:54 PM  Sign when Signing Visit  Name: Darlin Garcia      : 1955      MRN: [id number]  Encounter Provider: Darius Ansari MD  Encounter Date: 2025   Encounter department: [facility] SLEEP MEDICINE [facility]ED    :  Assessment & Plan  Snoring  Snoring / Concern for Obstructive Sleep Apnea - Discussed pathophysiology of HARMEET, consequences of untreated HARMEET and treatment options. - Discussed risks of sleepy driving and mitigation strategies (napping). Patient agrees to not drive if tired or sleepy. - Advised avoidance of alcohol and centrally acting medications as these can worsen HARMEET.  - Darlin presents today for new patient evaluation of snoring, gasping, witnessed apnea, unrefreshing sleep quality, airway crowding on exam.  Their collective clinical signs and symptoms may be suggestive of undiagnosed sleep disordered breathing.  Home sleep study order has been placed today in the office.  - I have asked the patient to return to the office once sleep testing is completed to review study results and discuss treatment options.  Patient verbalized understanding and stated that they would do so.    Orders:  •  Ambulatory Referral to Sleep Medicine  •  Home Sleep Study; Future    Benign essential hypertension  Hypertension - We reviewed the association between untreated obstructive sleep apnea and the increased risk for hypertension. Patient  "to continue on prescribed anti-hypertensive therapy and follow up with PCP for continuity of care.     Orders:  •  Home Sleep Study; Future    Type 2 diabetes mellitus with hyperosmolarity without coma, without long-term current use of insulin (HCC)  With reported history of type 2 diabetes. With most recent A1C of   Lab Results   Component Value Date    HGBA1C 5.5 01/17/2025   . There is an association between sleep and glucose control and insulin sensitivity.  I have encouraged the patient to aim for at least 7 to 9 hours of sleep nightly for overall health and wellness.  For sleep testing as above.    Lab Results   Component Value Date    HGBA1C 5.5 01/17/2025       Orders:  •  Home Sleep Study; Future    BMI 37.0-37.9, adult  Elevated BMI - We reviewed the association of elevated BMI on obstructive sleep apnea and sleep disordered breathing. Encouraged patient to follow healthy diet, regular exercise regimen, and pursue weight loss to manage symptoms.            History of Present Illness    Pertinent positives/negatives included in HPI and also as noted:     Objective  /78   Pulse 83   Ht 5' 1\" (1.549 m)   Wt 88.9 kg (196 lb)   SpO2 96%   BMI 37.03 kg/m²     Neck Circumference: 14  [facility] Sleep Center New Patient Evaluation    Ms. Garcia is a 69 y.o. female with a PMH of HTN, obesity, T2DM, SLE, who presents as a new patient for evaluation of Sleep Disordered Breathing.     I have reviewed the 4/24/25 family medicine office note with JOSUE Yanez.     I have reviewed the 1/17/25 family medicine office note with Carmina Reyes MD.     History of Presenting Illness:    The patient snores. There are choking and gasping episodes. There are witnessed apneas. 1-2x episode(s) of nocturia occur per night. The patient sleeps on her side. There are no reports of nocturnal behaviors.     The patient's [place] sleepiness scale score is 7/24 (<=10 is normal). She has not been sleepy while " "driving. She has not had a fall-asleep motor vehicle accident. There are no reports of hypnagogic hallucinations, cataplexy or sleep paralysis.    In terms of the patient's sleep/wake cycle symptoms:  Bedtime: 7:00pm-9:00pm   SOL: Brief < 30 Minutes   Nocturnal awakenings: 1-2x, Nocturia  Wakeup time: 3:00am   Naps: Denied    Total sleep time estimate: Approximately 6-7 hours.     She has not tried any medications for poor sleep in the past.    Her legs do occasionally bother her on trying to initiate sleep.    Past Medical History:   Diagnosis Date   • Arthritis    • Arthropathy     ONSET: 09FEB2011   • Cellulitis     ONSET: 19AUG2009   • Colon polyp    • Costochondritis     ONSET: 23MAY2005   • Dermatitis     ONSET: 25JAN2005   • Diabetes mellitus (HCC)     borderline   • Hemorrhagic detachment of retinal pigment epithelium     ONSET: 10FEB2009   • Hx of vertigo     one year ago 2020-treated-no further episodes   • Hypertension     LAST ASSESSED: 02JUN2014   • Ingrown nail     LAST ASSESSED: 04MAY2010   • Labyrinthitis     ONSET: 30NOV2007   • Lymphadenopathy     ONSET: 10OCT2007   • Myalgia     ONSET: 23MAY2005   • Myositis     ONSET: 23MAY2005   • Nonallopathic lesion     ONSET: 07JUL2009   • Shortness of breath     ONSET: 30SEP2008-with exertion-had cardiac workup with stress test-was \"good\" per patient  2/23/21  DS   • Systemic lupus erythematosus (HCC)     LAST ASSESSED: 02JUN2014   • Tinea corporis     ONSET: 22JUL2011   • Vertigo     LAST ASSESSED: 15FEB2013        Past Surgical History:   Procedure Laterality Date   • COLONOSCOPY     • EGD     • FINGER SURGERY      left index        No prior upper airway surgeries.     Allergies   Allergen Reactions   • Other      Reaction Date: 25Jan2005; Annotation - [id number]: rash...madlpn adhesive tape   • Penicillins Rash     Reaction Date: 16Feb2004; Annotation - [id number]: jstaceyw        Current Outpatient Medications on File Prior to Visit   Medication Sig Dispense " Refill   • albuterol (PROVENTIL HFA,VENTOLIN HFA) 90 mcg/act inhaler Inhale 2 puffs every 6 (six) hours as needed for wheezing 8 g 0   • aspirin (ECOTRIN LOW STRENGTH) 81 mg EC tablet Take 81 mg by mouth daily     • atorvastatin (LIPITOR) 20 mg tablet TAKE 1 TABLET BY MOUTH EVERY DAY WITH DINNER 90 tablet 1   • azithromycin (Zithromax) 250 mg tablet Take 2 tablets (500 mg total) by mouth daily for 1 day, THEN 1 tablet (250 mg total) daily for 4 days. 6 tablet 0   • benzonatate (TESSALON) 200 MG capsule Take 1 capsule (200 mg total) by mouth 3 (three) times a day as needed for cough 20 capsule 0   • Cholecalciferol (VITAMIN D3) 3000 units TABS Take 1,000 Units by mouth 2 (two) times a day       • Coenzyme Q10 (COQ10) 200 MG CAPS Take 1 capsule by mouth daily     • ezetimibe (ZETIA) 10 mg tablet TAKE 1 TABLET BY MOUTH EVERY DAY 90 tablet 0   • hydroxychloroquine (PLAQUENIL) 200 mg tablet 2 (two) times a day with meals     • lisinopril (ZESTRIL) 40 mg tablet Take 1 tablet (40 mg total) by mouth daily 90 tablet 2   • LORazepam (ATIVAN) 1 mg tablet TAKE 1 TABLET BY MOUTH ONE HOUR PRIOR TO APPOINTMENT AFTER CHECK IN MY REPEAT ONE TIME     • methylPREDNISolone 4 MG tablet therapy pack Use as directed on package 21 each 0   • metoprolol tartrate (LOPRESSOR) 25 mg tablet TAKE 0.5 TABLETS (12.5 MG TOTAL) BY MOUTH EVERY 12 (TWELVE) HOURS 90 tablet 1   • Omega-3 Fatty Acids (fish oil) 1,000 mg Take 2 capsules (2,000 mg total) by mouth 2 (two) times a day 120 capsule 0   • semaglutide, 0.25 or 0.5 mg/dose, (Ozempic, 0.25 or 0.5 MG/DOSE,) 2 mg/3 mL injection pen INJECT 0.75 ML (0.5 MG TOTAL) UNDER THE SKIN EVERY 7 DAYS 3 mL 5   • semaglutide, 1 mg/dose, (Ozempic) 4 mg/3 mL injection pen Inject 0.75 mL (1 mg total) under the skin once a week (Patient not taking: Reported on 4/24/2025) 3 mL 0     No current facility-administered medications on file prior to visit.         Family History: Her family history includes Coronary artery  "disease in her brother and sister; Diabetes in her sister; Hypertension in her father; No Known Problems in her brother, daughter, maternal aunt, maternal aunt, maternal aunt, maternal grandfather, maternal grandmother, paternal aunt, paternal aunt, paternal grandfather, and paternal grandmother; Other in her brother; Stomach cancer in her mother.    No prior family history of sleep disorders.     Social History:   Job: Works from 5:30am - 3pm works at [employer]  Caffeine: Occasional Bry Tea, Rare Soda Daily  Alcohol: Denied  Drugs: Denied  Tobacco: Denied  Vape: Denied    Patient's medications, allergies, past medical, surgical, social and family histories were reviewed in the electronic medical record and updated as appropriate.    Review of Systems: On review of systems, the patient reports: No AM Headaches, denied regular nasal sinus congestion, does not wake with dry mouth and dry throat in morning.    Vitals:    04/29/25 1531   BP: 120/78   Pulse: 83   SpO2: 96%       Physical Examination:  Gen: No acute distress, not visibly anxious, speaking comfortably  H&N: MM III; no retro/micrognathia; no macroglossia; no visible thyromegaly  Neuro: Alert and oriented x3, interactive  Psych: Pleasant, normal affect  Skin: No visible rashes  Respiratory: No accessory muscle use, breathing comfortably  Cardiac: No visible edema of extremities  Abdomen: Soft, NT, nondistended  Musculoskeletal: Normal ROM Intact of Extremities    Study Results:  I reviewed the following labs:    No results found for: \"FERRITIN\"    Lab Results   Component Value Date    WBC 6.7 09/21/2024    HGB 13.5 09/21/2024    HCT 42.6 09/21/2024    MCV 88 09/21/2024     09/21/2024       This SmartLink has not been configured with any valid records.       Lab Results   Component Value Date    SODIUM 145 (H) 09/21/2024    K 4.1 09/21/2024     (H) 09/21/2024    CO2 24 09/21/2024    BUN 10 09/21/2024    CREATININE 0.72 09/21/2024    GLUC 85 " "09/21/2024    CALCIUM 8.3 09/04/2022       This SmartLink has not been configured with any valid records.       Lab Results   Component Value Date    IFP4AERPORRZ 1.720 06/07/2017    TSH 2.570 07/11/2018          Results/Data:  I have reviewed the results and report from the 9/3/2022 CT head.    Independent Findings Reviewed: No acute intracranial abnormality.    I have reviewed the results and report from the 9/8/22 TTE: Left Ventricle: Left ventricular cavity size is normal. Wall thickness is normal. The left ventricular ejection fraction is 65%. Systolic function is normal. Wall motion is normal. Diastolic function is normal.     Independent Findings Reviewed: Normal left ventricular ejection fraction.  No wall motion abnormalities.    I answered the patient's questions to the best of my ability. We will continue with longitudinal follow-up for evaluation of sleep disordered breathing. Follow-up will be after sleep testing is completed.    Darius Ansari MD  Sleep Medicine and Internal Medicine  [facility]  04/29/25      Portions of the record may have been created with voice recognition software.  Occasional wrong word or \"sound a like\" substitutions may have occurred due to the inherent limitations of voice recognition software.  Read the chart carefully and recognize, using context, where substitutions have occurred.       "

## 2025-05-21 DIAGNOSIS — R05.1 ACUTE COUGH: ICD-10-CM

## 2025-05-22 RX ORDER — ALBUTEROL SULFATE 90 UG/1
2 INHALANT RESPIRATORY (INHALATION) EVERY 6 HOURS PRN
Qty: 8 G | Refills: 3 | Status: SHIPPED | OUTPATIENT
Start: 2025-05-22

## 2025-05-24 DIAGNOSIS — I10 BENIGN ESSENTIAL HYPERTENSION: ICD-10-CM

## 2025-05-25 RX ORDER — LISINOPRIL 40 MG/1
40 TABLET ORAL DAILY
Qty: 90 TABLET | Refills: 1 | Status: SHIPPED | OUTPATIENT
Start: 2025-05-25

## 2025-05-27 ENCOUNTER — OFFICE VISIT (OUTPATIENT)
Dept: FAMILY MEDICINE CLINIC | Facility: CLINIC | Age: 70
End: 2025-05-27
Payer: COMMERCIAL

## 2025-05-27 ENCOUNTER — APPOINTMENT (OUTPATIENT)
Dept: RADIOLOGY | Facility: CLINIC | Age: 70
End: 2025-05-27
Payer: COMMERCIAL

## 2025-05-27 VITALS
SYSTOLIC BLOOD PRESSURE: 136 MMHG | BODY MASS INDEX: 37.8 KG/M2 | DIASTOLIC BLOOD PRESSURE: 88 MMHG | OXYGEN SATURATION: 99 % | HEIGHT: 61 IN | RESPIRATION RATE: 16 BRPM | HEART RATE: 87 BPM | TEMPERATURE: 97.3 F | WEIGHT: 200.2 LBS

## 2025-05-27 DIAGNOSIS — J22 LOWER RESPIRATORY TRACT INFECTION: Primary | ICD-10-CM

## 2025-05-27 DIAGNOSIS — M32.8 OTHER FORMS OF SYSTEMIC LUPUS ERYTHEMATOSUS, UNSPECIFIED ORGAN INVOLVEMENT STATUS (HCC): ICD-10-CM

## 2025-05-27 DIAGNOSIS — J22 LOWER RESPIRATORY TRACT INFECTION: ICD-10-CM

## 2025-05-27 DIAGNOSIS — E11.00 TYPE 2 DIABETES MELLITUS WITH HYPEROSMOLARITY WITHOUT COMA, WITHOUT LONG-TERM CURRENT USE OF INSULIN (HCC): ICD-10-CM

## 2025-05-27 PROCEDURE — 99214 OFFICE O/P EST MOD 30 MIN: CPT | Performed by: STUDENT IN AN ORGANIZED HEALTH CARE EDUCATION/TRAINING PROGRAM

## 2025-05-27 PROCEDURE — 71046 X-RAY EXAM CHEST 2 VIEWS: CPT

## 2025-05-27 RX ORDER — DOXYCYCLINE HYCLATE 100 MG
100 TABLET ORAL 2 TIMES DAILY
Qty: 14 TABLET | Refills: 0 | Status: SHIPPED | OUTPATIENT
Start: 2025-05-27 | End: 2025-06-03

## 2025-05-27 RX ORDER — PREDNISONE 10 MG/1
10 TABLET ORAL DAILY
Qty: 7 TABLET | Refills: 0 | Status: SHIPPED | OUTPATIENT
Start: 2025-05-27

## 2025-05-27 NOTE — ASSESSMENT & PLAN NOTE
Hemoglobin A1c of 5.5% well-controlled  Lab Results   Component Value Date    HGBA1C 5.5 01/17/2025

## 2025-05-27 NOTE — PROGRESS NOTES
Name: Darlin Garcia      : 1955      MRN: [id number]  Encounter Provider: Billy Kinney DO  Encounter Date: 2025   Encounter department: [facility]  :  Assessment & Plan  Lower respiratory tract infection  Symptoms consistent with atypical bacterial infection given duration, recommend antibiotic therapy with doxycycline, low-dose steroid to help relieve congestion/inflammation, if symptoms worsen or not improving reevaluation recommended.  Orders:  •  doxycycline hyclate (VIBRA-TABS) 100 mg tablet; Take 1 tablet (100 mg total) by mouth 2 (two) times a day for 7 days  •  predniSONE 10 mg tablet; Take 1 tablet (10 mg total) by mouth daily  •  XR chest pa and lateral; Future    Type 2 diabetes mellitus with hyperosmolarity without coma, without long-term current use of insulin ([facility])  Hemoglobin A1c of 5.5% well-controlled  Lab Results   Component Value Date    HGBA1C 5.5 2025            Other forms of systemic lupus erythematosus, unspecified organ involvement status ([facility])  History noted              History of Present Illness   Follow-up visit, concern for lower respiratory tract infection.    Cough  Pertinent negatives include no chest pain, chills, ear pain, fever, rash, sore throat or shortness of breath.     Review of Systems   Constitutional:  Negative for chills and fever.   HENT:  Negative for ear pain and sore throat.    Eyes:  Negative for pain and visual disturbance.   Respiratory:  Positive for cough. Negative for shortness of breath.    Cardiovascular:  Negative for chest pain and palpitations.   Gastrointestinal:  Negative for abdominal pain and vomiting.   Genitourinary:  Negative for dysuria and hematuria.   Musculoskeletal:  Negative for arthralgias and back pain.   Skin:  Negative for color change and rash.   Neurological:  Negative for seizures and syncope.   Psychiatric/Behavioral:  Negative for agitation, behavioral problems and confusion.   "  All other systems reviewed and are negative.      Objective   /88 (BP Location: Left arm, Patient Position: Sitting, Cuff Size: Large)   Pulse 87   Temp (!) 97.3 °F (36.3 °C) (Temporal)   Resp 16   Ht 5' 1\" (1.549 m)   Wt 90.8 kg (200 lb 3.2 oz)   SpO2 99%   BMI 37.83 kg/m²      Physical Exam  Vitals and nursing note reviewed.   Constitutional:       General: She is not in acute distress.     Appearance: She is well-developed.   HENT:      Head: Normocephalic and atraumatic.     Eyes:      General: No scleral icterus.     Conjunctiva/sclera: Conjunctivae normal.       Cardiovascular:      Rate and Rhythm: Normal rate and regular rhythm.      Pulses: Normal pulses.      Heart sounds: No murmur heard.  Pulmonary:      Effort: Pulmonary effort is normal. No respiratory distress.      Breath sounds: Normal breath sounds.   Abdominal:      General: There is no distension.      Palpations: Abdomen is soft.      Tenderness: There is no abdominal tenderness.     Musculoskeletal:         General: No swelling. Normal range of motion.      Cervical back: Neck supple.     Skin:     General: Skin is warm and dry.      Capillary Refill: Capillary refill takes less than 2 seconds.     Neurological:      General: No focal deficit present.      Mental Status: She is alert and oriented to person, place, and time. Mental status is at baseline.     Psychiatric:         Mood and Affect: Mood normal.         Behavior: Behavior normal.         "

## 2025-05-28 ENCOUNTER — TELEPHONE (OUTPATIENT)
Age: 70
End: 2025-05-28

## 2025-05-28 NOTE — TELEPHONE ENCOUNTER
Patient called to ask if her chest X-ray result had come in yet.  She had it done yesterday.  Informed her that it was still in process, but assured her that we would be in touch once it had finalized and her provider had reviewed and made their recommendations.

## 2025-05-29 ENCOUNTER — RESULTS FOLLOW-UP (OUTPATIENT)
Dept: FAMILY MEDICINE CLINIC | Facility: CLINIC | Age: 70
End: 2025-05-29

## 2025-05-29 DIAGNOSIS — J22 LOWER RESPIRATORY TRACT INFECTION: Primary | ICD-10-CM

## 2025-05-29 RX ORDER — DEXTROMETHORPHAN HYDROBROMIDE AND PROMETHAZINE HYDROCHLORIDE 15; 6.25 MG/5ML; MG/5ML
5 SYRUP ORAL 4 TIMES DAILY PRN
Qty: 118 ML | Refills: 1 | Status: SHIPPED | OUTPATIENT
Start: 2025-05-29

## 2025-05-29 NOTE — TELEPHONE ENCOUNTER
----- Message from Billy Kinney DO sent at 5/29/2025  7:10 AM EDT -----  Please let patient know that x-ray imaging of chest shows no acute cardiopulmonary disease.  Recommend continuing doxycycline/steroid to completion, monitor for symptomatic resolution.  ----- Message -----  From: Interface, Radiology Results In  Sent: 5/28/2025   7:36 PM EDT  To: Billy Kinney DO

## 2025-06-18 ENCOUNTER — HOSPITAL ENCOUNTER (OUTPATIENT)
Dept: SLEEP CENTER | Facility: CLINIC | Age: 70
Discharge: HOME | End: 2025-06-18
Attending: STUDENT IN AN ORGANIZED HEALTH CARE EDUCATION/TRAINING PROGRAM
Payer: COMMERCIAL

## 2025-06-18 DIAGNOSIS — R06.83 SNORING: ICD-10-CM

## 2025-06-18 DIAGNOSIS — I10 BENIGN ESSENTIAL HYPERTENSION: ICD-10-CM

## 2025-06-18 DIAGNOSIS — E11.00 TYPE 2 DIABETES MELLITUS WITH HYPEROSMOLARITY WITHOUT COMA, WITHOUT LONG-TERM CURRENT USE OF INSULIN (HCC): ICD-10-CM

## 2025-06-18 PROCEDURE — G0399 HOME SLEEP TEST/TYPE 3 PORTA: HCPCS

## 2025-06-18 NOTE — PROGRESS NOTES
Home Sleep Study Documentation    HOME STUDY DEVICE: Noxturnal no                                           Jadyn G3 yes device # 11      Pre-Sleep Home Study:    Set-up and instructions performed by: Betty    Technician performed demonstration for Patient: yes    Return demonstration performed by Patient: yes    Written instructions provided to Patient: yes    Patient signed consent form: yes          Post-Sleep Home Study:    Post Test Questionnaire Data: Pending    Additional comments by Patient: pending    Home Sleep Study Failed:pending    Failure reason: pending    Reported or Detected: pending    Scored by: pending

## 2025-06-23 ENCOUNTER — DOCUMENTATION (OUTPATIENT)
Dept: SLEEP CENTER | Facility: CLINIC | Age: 70
End: 2025-06-23

## 2025-06-23 PROBLEM — G47.33 OSA (OBSTRUCTIVE SLEEP APNEA): Status: ACTIVE | Noted: 2025-06-23

## 2025-06-23 PROCEDURE — 95806 SLEEP STUDY UNATT&RESP EFFT: CPT | Performed by: STUDENT IN AN ORGANIZED HEALTH CARE EDUCATION/TRAINING PROGRAM

## 2025-06-23 NOTE — PROGRESS NOTES
Patient recently completed a home sleep study which revealed mild obstructive sleep apnea.    I have messaged the clinical sleep pool to contact the patient regarding sleep study results and schedule office follow-up to discuss further.

## 2025-07-10 ENCOUNTER — TELEPHONE (OUTPATIENT)
Dept: SLEEP CENTER | Facility: CLINIC | Age: 70
End: 2025-07-10

## 2025-07-10 NOTE — TELEPHONE ENCOUNTER
Home sleep study resulted and shows mild sleep apnea worse in supine position with event related desaturations. SINA 6.3. Follow up with Dr. Ansari recommended.     Results read by patient.     Call to patient reviewed results.     Follow up appointment with Dr. Ansari scheduled for 7/17/25 @ 9 am in the [place] office.

## 2025-07-15 LAB
LEFT EYE DIABETIC RETINOPATHY: NORMAL
RIGHT EYE DIABETIC RETINOPATHY: NORMAL

## 2025-07-17 ENCOUNTER — OFFICE VISIT (OUTPATIENT)
Dept: SLEEP CENTER | Facility: CLINIC | Age: 70
End: 2025-07-17
Payer: COMMERCIAL

## 2025-07-17 ENCOUNTER — OFFICE VISIT (OUTPATIENT)
Dept: FAMILY MEDICINE CLINIC | Facility: CLINIC | Age: 70
End: 2025-07-17
Payer: COMMERCIAL

## 2025-07-17 VITALS
SYSTOLIC BLOOD PRESSURE: 110 MMHG | HEART RATE: 69 BPM | HEIGHT: 61 IN | WEIGHT: 198 LBS | BODY MASS INDEX: 37.38 KG/M2 | TEMPERATURE: 98.1 F | OXYGEN SATURATION: 98 % | RESPIRATION RATE: 18 BRPM | DIASTOLIC BLOOD PRESSURE: 80 MMHG

## 2025-07-17 VITALS
HEIGHT: 61 IN | BODY MASS INDEX: 37.23 KG/M2 | SYSTOLIC BLOOD PRESSURE: 140 MMHG | DIASTOLIC BLOOD PRESSURE: 82 MMHG | WEIGHT: 197.2 LBS | HEART RATE: 84 BPM | OXYGEN SATURATION: 97 %

## 2025-07-17 DIAGNOSIS — G47.33 OSA (OBSTRUCTIVE SLEEP APNEA): Primary | ICD-10-CM

## 2025-07-17 DIAGNOSIS — E78.5 HYPERLIPIDEMIA, UNSPECIFIED HYPERLIPIDEMIA TYPE: ICD-10-CM

## 2025-07-17 DIAGNOSIS — E11.00 TYPE 2 DIABETES MELLITUS WITH HYPEROSMOLARITY WITHOUT COMA, WITHOUT LONG-TERM CURRENT USE OF INSULIN (HCC): Primary | ICD-10-CM

## 2025-07-17 DIAGNOSIS — I10 BENIGN ESSENTIAL HYPERTENSION: ICD-10-CM

## 2025-07-17 DIAGNOSIS — Z13.29 SCREENING FOR THYROID DISORDER: ICD-10-CM

## 2025-07-17 DIAGNOSIS — E11.00 TYPE 2 DIABETES MELLITUS WITH HYPEROSMOLARITY WITHOUT COMA, WITHOUT LONG-TERM CURRENT USE OF INSULIN (HCC): ICD-10-CM

## 2025-07-17 LAB — SL AMB POCT HEMOGLOBIN AIC: 5.4 (ref ?–6.5)

## 2025-07-17 PROCEDURE — 99214 OFFICE O/P EST MOD 30 MIN: CPT | Performed by: FAMILY MEDICINE

## 2025-07-17 PROCEDURE — 99214 OFFICE O/P EST MOD 30 MIN: CPT | Performed by: STUDENT IN AN ORGANIZED HEALTH CARE EDUCATION/TRAINING PROGRAM

## 2025-07-17 PROCEDURE — 83036 HEMOGLOBIN GLYCOSYLATED A1C: CPT | Performed by: FAMILY MEDICINE

## 2025-07-17 NOTE — PROGRESS NOTES
Name: Darlin Garcia      : 1955      MRN: [id number]  Encounter Provider: Carmina Reyes MD  Encounter Date: 2025   Encounter department: [facility]    Assessment & Plan  Type 2 diabetes mellitus with hyperosmolarity without coma, without long-term current use of insulin (HCC)    Lab Results   Component Value Date    HGBA1C 5.4 2025       Orders:  •  POCT hemoglobin A1c  •  semaglutide, 1 mg/dose, (Ozempic) 4 mg/3 mL injection pen; Inject 0.75 mL (1 mg total) under the skin once a week  •  CBC and differential; Future  •  Comprehensive metabolic panel; Future  •  Albumin / creatinine urine ratio; Future  •  Hemoglobin A1C; Future    Benign essential hypertension         Hyperlipidemia, unspecified hyperlipidemia type    Orders:  •  Lipid panel; Future    Screening for thyroid disorder    Orders:  •  TSH, 3rd generation with Free T4 reflex; Future         History of Present Illness     HPI 69-year-old female presenting to the office.  Patient with a history of diabetes currently taking her medications as prescribed.  Patient taking her Lipitor as prescribed as well.  Patient is about to become a grandmother.  Denies any other acute concerns today  States the pharmacy never gave her Ozempic 1.  Has been taking 0.5  Review of Systems   Constitutional:  Negative for activity change, appetite change, chills, fatigue and fever.   HENT:  Negative for congestion.    Respiratory:  Negative for cough, chest tightness and shortness of breath.    Cardiovascular:  Negative for chest pain and leg swelling.   Gastrointestinal:  Negative for abdominal distention, abdominal pain, constipation, diarrhea, nausea and vomiting.   All other systems reviewed and are negative.    Past Medical History[1]  Past Surgical History[1]  Family History[1]  Social History[1]  Medications[1]  Allergies   Allergen Reactions   • Other      Reaction Date: 2005; Annotation - [id number]:  "rash...madlpn adhesive tape   • Penicillins Rash     Reaction Date: 16Feb2004; Annotation - [id number]: jjw     Immunization History   Administered Date(s) Administered   • COVID-19 PFIZER VACCINE 0.3 ML IM 03/20/2021, 04/10/2021, 01/19/2022   • Influenza Quadrivalent Preservative Free 3 years and older IM 09/25/2014   • Influenza, high dose seasonal 0.7 mL 12/22/2022   • Influenza, seasonal, injectable 09/05/2009, 09/23/2010, 10/01/2011, 09/26/2013, 09/20/2017   • Td (adult), adsorbed 07/14/1997   • Tdap 05/26/2016     Objective   /80 (BP Location: Left arm, Patient Position: Sitting, Cuff Size: Large)   Pulse 69   Temp 98.1 °F (36.7 °C) (Temporal)   Resp 18   Ht 5' 1\" (1.549 m)   Wt 89.8 kg (198 lb)   SpO2 98%   BMI 37.41 kg/m²     Physical Exam  Vitals reviewed.   Constitutional:       Appearance: Normal appearance. She is well-developed.   HENT:      Head: Normocephalic and atraumatic.      Right Ear: Tympanic membrane, ear canal and external ear normal. There is no impacted cerumen.      Left Ear: Tympanic membrane, ear canal and external ear normal. There is no impacted cerumen.      Nose: Nose normal.      Mouth/Throat:      Mouth: Mucous membranes are moist.      Pharynx: Oropharynx is clear.     Eyes:      Conjunctiva/sclera: Conjunctivae normal.      Pupils: Pupils are equal, round, and reactive to light.       Cardiovascular:      Rate and Rhythm: Normal rate and regular rhythm.      Pulses: no weak pulses.           Dorsalis pedis pulses are 2+ on the right side and 2+ on the left side.        Posterior tibial pulses are 2+ on the right side and 2+ on the left side.      Heart sounds: Normal heart sounds.   Pulmonary:      Effort: Pulmonary effort is normal.      Breath sounds: Normal breath sounds.   Abdominal:      General: Abdomen is flat. Bowel sounds are normal.      Palpations: Abdomen is soft.     Musculoskeletal:         General: Normal range of motion.      Cervical back: Normal " range of motion and neck supple.   Feet:      Right foot:      Skin integrity: No ulcer, skin breakdown, erythema, warmth, callus or dry skin.      Left foot:      Skin integrity: No ulcer, skin breakdown, erythema, warmth, callus or dry skin.     Skin:     General: Skin is warm.      Capillary Refill: Capillary refill takes less than 2 seconds.     Neurological:      General: No focal deficit present.      Mental Status: She is alert and oriented to person, place, and time. Mental status is at baseline.     Psychiatric:         Mood and Affect: Mood normal.         Behavior: Behavior normal.         Thought Content: Thought content normal.         Judgment: Judgment normal.       Diabetic Foot Exam    Patient's shoes and socks removed.    Right Foot/Ankle   Right Foot Inspection  Skin Exam: skin normal and skin intact. No dry skin, no warmth, no callus, no erythema, no maceration, no abnormal color, no pre-ulcer, no ulcer and no callus.     Toe Exam: ROM and strength within normal limits. No swelling    Sensory   Vibration: intact  Proprioception: intact  Monofilament testing: intact    Vascular  Capillary refills: < 3 seconds  The right DP pulse is 2+. The right PT pulse is 2+.     Left Foot/Ankle  Left Foot Inspection  Skin Exam: skin normal and skin intact. No dry skin, no warmth, no erythema, no maceration, normal color, no pre-ulcer, no ulcer and no callus.     Toe Exam: ROM and strength within normal limits. No swelling.     Sensory   Vibration: intact  Proprioception: intact  Monofilament testing: intact    Vascular  Capillary refills: < 3 seconds  The left DP pulse is 2+. The left PT pulse is 2+.     Assign Risk Category  No deformity present  Loss of protective sensation  No weak pulses  Risk: 0           [1]  Past Medical History:  Diagnosis Date   • Arthritis    • Arthropathy     ONSET: 09FEB2011   • Cellulitis     ONSET: 19AUG2009   • Colon polyp    • Costochondritis     ONSET: 23MAY2005   • Dermatitis   "   ONSET: 25JAN2005   • Diabetes mellitus (HCC)     borderline   • Hemorrhagic detachment of retinal pigment epithelium     ONSET: 10FEB2009   • Hx of vertigo     one year ago 2020-treated-no further episodes   • Hypertension     LAST ASSESSED: 02JUN2014   • Ingrown nail     LAST ASSESSED: 04MAY2010   • Ingrown toenail    • Labyrinthitis     ONSET: 30NOV2007   • Lymphadenopathy     ONSET: 10OCT2007   • Myalgia     ONSET: 23MAY2005   • Myositis     ONSET: 23MAY2005   • Nonallopathic lesion     ONSET: 07JUL2009   • HARMEET (obstructive sleep apnea) 6/23/2025   • Shortness of breath     ONSET: 30SEP2008-with exertion-had cardiac workup with stress test-was \"good\" per patient  2/23/21  DS   • Systemic lupus erythematosus (HCC)     LAST ASSESSED: 02JUN2014   • Tinea corporis     ONSET: 22JUL2011   • Vertigo     LAST ASSESSED: 15FEB2013   [1]  Past Surgical History:  Procedure Laterality Date   • COLONOSCOPY     • EGD     • FINGER SURGERY      left index   [1]  Family History  Problem Relation Name Age of Onset   • Stomach cancer Mother          MALIGNANT NEOPLASM   • Hypertension Father     • Coronary artery disease Sister     • Diabetes Sister Marcela    • No Known Problems Daughter     • No Known Problems Maternal Grandmother     • No Known Problems Maternal Grandfather     • No Known Problems Paternal Grandmother     • No Known Problems Paternal Grandfather     • Coronary artery disease Brother     • Other Brother          CARDIAC PACEMAKER, CARDIOMEGALY   • No Known Problems Brother     • No Known Problems Maternal Aunt     • No Known Problems Maternal Aunt     • No Known Problems Maternal Aunt     • No Known Problems Paternal Aunt     • No Known Problems Paternal Aunt     [1]  Social History  Tobacco Use   • Smoking status: Never     Passive exposure: Never   • Smokeless tobacco: Never   Vaping Use   • Vaping status: Never Used   Substance and Sexual Activity   • Alcohol use: Yes     Comment: rare   • Drug use: No   • " Sexual activity: Not Currently   [1]  Current Outpatient Medications on File Prior to Visit   Medication Sig   • albuterol (PROVENTIL HFA,VENTOLIN HFA) 90 mcg/act inhaler Inhale 2 puffs every 6 (six) hours as needed for wheezing   • aspirin (ECOTRIN LOW STRENGTH) 81 mg EC tablet Take 81 mg by mouth in the morning.   • atorvastatin (LIPITOR) 20 mg tablet TAKE 1 TABLET BY MOUTH EVERY DAY WITH DINNER   • Cholecalciferol (VITAMIN D3) 3000 units TABS Take 1,000 Units by mouth in the morning and 1,000 Units in the evening.   • Coenzyme Q10 (COQ10) 200 MG CAPS Take 1 capsule by mouth in the morning.   • ezetimibe (ZETIA) 10 mg tablet TAKE 1 TABLET BY MOUTH EVERY DAY   • hydroxychloroquine (PLAQUENIL) 200 mg tablet in the morning and in the evening. Take with meals.   • lisinopril (ZESTRIL) 40 mg tablet TAKE 1 TABLET BY MOUTH EVERY DAY   • LORazepam (ATIVAN) 1 mg tablet    • metoprolol tartrate (LOPRESSOR) 25 mg tablet TAKE 0.5 TABLETS (12.5 MG TOTAL) BY MOUTH EVERY 12 (TWELVE) HOURS   • predniSONE 10 mg tablet Take 1 tablet (10 mg total) by mouth daily   • Omega-3 Fatty Acids (fish oil) 1,000 mg Take 2 capsules (2,000 mg total) by mouth 2 (two) times a day (Patient not taking: Reported on 7/17/2025)

## 2025-07-17 NOTE — PROGRESS NOTES
Name: Darlin Garcia      : 1955      MRN: [id number]  Encounter Provider: Darius Ansari MD  Encounter Date: 2025   Encounter department: [facility]    :  Assessment & Plan  HARMEET (obstructive sleep apnea)  Mild Obstructive Sleep Apnea - Discussed pathophysiology of HARMEET, consequences of untreated HARMEET and treatment options including PAP therapy, mandibular advancement device, positional therapy, and surgical referral. - Discussed risks of sleepy driving and mitigation strategies (napping). Patient agrees to not drive if tired or sleepy. - Advised avoidance of alcohol and centrally acting medications as these can worsen HARMEET.  - Darlin presents today for follow-up of mild obstructive sleep apnea.  We have reviewed her home sleep study results today together in detail during our office visit.  We reviewed and discussed multiple treatment options for mild obstructive sleep apnea.  -Patient elects to start with a positional therapy device.  We reviewed treatment with positional therapy.  We have reviewed multiple positional therapy devices together in detail during our office visit.  Further information has been provided in her after visit summary.  I have encouraged the patient to purchase 1 of these devices to use with all periods of sleep.  -I have asked the patient to return to the office within 3 to 4 months for close clinical follow-up.  Patient verbalized understanding and stated that she would do so.       Benign essential hypertension  Hypertension - We reviewed the association between untreated obstructive sleep apnea and the increased risk for hypertension. Patient to continue on prescribed anti-hypertensive therapy and follow up with PCP for continuity of care.          Type 2 diabetes mellitus with hyperosmolarity without coma, without long-term current use of insulin (HCC)  With reported history of type 2 diabetes. With most recent A1C of   Lab Results  "  Component Value Date    HGBA1C 5.5 01/17/2025   . There is an association between sleep and glucose control and insulin sensitivity.  I have encouraged the patient to aim for at least 7 to 9 hours of sleep nightly for overall health and wellness.  For management of sleep disordered breathing as above.     Lab Results   Component Value Date    HGBA1C 5.5 01/17/2025            BMI 37.0-37.9, adult  Obesity - We reviewed the association of obesity on obstructive sleep apnea and sleep disordered breathing. Encouraged patient to follow healthy diet, regular exercise regimen, and pursue weight loss to manage symptoms.            History of Present Illness     HPI                                                  Review of Systems  Pertinent positives/negatives included in HPI and also as noted:         Objective   /82 (BP Location: Left arm, Patient Position: Sitting, Cuff Size: Adult)   Pulse 84   Ht 5' 1\" (1.549 m)   Wt 89.4 kg (197 lb 3.2 oz)   SpO2 97%   BMI 37.26 kg/m²        [facility]  Follow-up Visit    Patient Name: Darlin Garcia  Date of Service: 07/17/25  Date of Last Visit: 6/23/2025      PATIENT PROFILE  Ms. Garcia is a 69 y.o. female with a PMH of HTN, obesity, T2DM, SLE, who presents for follow up of sleep disordered breathing.     I have reviewed the 4/24/25 family medicine office note with JOSUE Yanez.      I have reviewed the 1/17/25 family medicine office note with Carmina Reyes MD.     Interval History:  Darlin presents today for follow-up of recent home sleep testing.    ESS today is 6/24 (normal is < 10).     She recently completed a home sleep study on 6/18/2025 at a weight of 196 pounds.  This home sleep study revealed mild obstructive sleep apnea with an overall SINA of 6.3 events per hour.  O2 kady of 73%.  Respiratory events were worse in the supine position.    I reviewed this home sleep study again in detail today with the " patient in the office.    We reviewed and discussed multiple treatment options for mild obstructive sleep apnea.  Patient elects to start with a positional therapy device.  We reviewed positional therapy treatment.  I have provided further information to the patient regarding positional therapy devices in her after visit summary.  I have asked the patient to purchase 1 of these devices to use with all periods of sleep including naps.    I have asked the patient to return to the office within 3 to 4 months for close clinical follow-up.  Patient verbalized understanding and stated that she would do so.    Sleep-Wake Schedule:   Bedtime: 7:00pm-9:00pm   SOL: Brief < 30 Minutes   Nocturnal awakenings: 1-2x, Nocturia  Wakeup time: 3:00am   Naps: Denied     Total sleep time estimate: Approximately 6-7 hours.     Patient's, past medical, surgical, social and family histories were reviewed and updated as appropriate.    Allergies[1]    Current Outpatient Medications on File Prior to Visit   Medication Sig    albuterol (PROVENTIL HFA,VENTOLIN HFA) 90 mcg/act inhaler Inhale 2 puffs every 6 (six) hours as needed for wheezing    aspirin (ECOTRIN LOW STRENGTH) 81 mg EC tablet Take 81 mg by mouth in the morning.    atorvastatin (LIPITOR) 20 mg tablet TAKE 1 TABLET BY MOUTH EVERY DAY WITH DINNER    Cholecalciferol (VITAMIN D3) 3000 units TABS Take 1,000 Units by mouth in the morning and 1,000 Units in the evening.    Coenzyme Q10 (COQ10) 200 MG CAPS Take 1 capsule by mouth in the morning.    ezetimibe (ZETIA) 10 mg tablet TAKE 1 TABLET BY MOUTH EVERY DAY    hydroxychloroquine (PLAQUENIL) 200 mg tablet in the morning and in the evening. Take with meals.    lisinopril (ZESTRIL) 40 mg tablet TAKE 1 TABLET BY MOUTH EVERY DAY    LORazepam (ATIVAN) 1 mg tablet     metoprolol tartrate (LOPRESSOR) 25 mg tablet TAKE 0.5 TABLETS (12.5 MG TOTAL) BY MOUTH EVERY 12 (TWELVE) HOURS    predniSONE 10 mg tablet Take 1 tablet (10 mg total) by mouth  "daily    promethazine-dextromethorphan (PHENERGAN-DM) 6.25-15 mg/5 mL oral syrup Take 5 mL by mouth 4 (four) times a day as needed for cough (Patient not taking: Reported on 7/17/2025)    semaglutide, 1 mg/dose, (Ozempic) 4 mg/3 mL injection pen Inject 0.75 mL (1 mg total) under the skin once a week (Patient taking differently: Inject 5 mg under the skin once a week)    benzonatate (TESSALON) 200 MG capsule Take 1 capsule (200 mg total) by mouth 3 (three) times a day as needed for cough (Patient not taking: Reported on 7/17/2025)    Omega-3 Fatty Acids (fish oil) 1,000 mg Take 2 capsules (2,000 mg total) by mouth 2 (two) times a day (Patient not taking: Reported on 7/17/2025)    semaglutide, 0.25 or 0.5 mg/dose, (Ozempic, 0.25 or 0.5 MG/DOSE,) 2 mg/3 mL injection pen INJECT 0.75 ML (0.5 MG TOTAL) UNDER THE SKIN EVERY 7 DAYS (Patient not taking: Reported on 7/17/2025)     No current facility-administered medications on file prior to visit.       Physical Examination:  Gen: No acute distress, not visibly anxious, speaking comfortably  H&N: MM III; no retro/micrognathia; no macroglossia; no visible thyromegaly  Neuro: Alert and oriented x3, interactive  Psych: Pleasant, normal affect  Skin: No visible rashes  Respiratory: No accessory muscle use, breathing comfortably  Cardiac: No visible edema of extremities  Abdomen: Soft, NT, nondistended  Musculoskeletal: Normal ROM Intact of Extremities      Lab Results   Component Value Date    SODIUM 145 (H) 09/21/2024    K 4.1 09/21/2024     (H) 09/21/2024    CO2 24 09/21/2024    BUN 10 09/21/2024    CREATININE 0.72 09/21/2024    GLUC 85 09/21/2024    CALCIUM 8.3 09/04/2022       Lab Results   Component Value Date    WBC 6.7 09/21/2024    HGB 13.5 09/21/2024    HCT 42.6 09/21/2024    MCV 88 09/21/2024     09/21/2024       Lab Results   Component Value Date    HGJ8EOJNPSZZ 1.720 06/07/2017    TSH 2.570 07/11/2018       No results found for: \"FERRITIN\"      Recent " "Weights: Body mass index is 37.26 kg/m².    Results/Data: I have reviewed the results and report from the 9/3/2022 CT head.     Independent Findings Reviewed: No acute intracranial abnormality.     I have reviewed the results and report from the 9/8/22 TTE: Left Ventricle: Left ventricular cavity size is normal. Wall thickness is normal. The left ventricular ejection fraction is 65%. Systolic function is normal. Wall motion is normal. Diastolic function is normal.      Independent Findings Reviewed: Normal left ventricular ejection fraction.  No wall motion abnormalities.    Follow-up will be in 3-4 months. I encouraged her to contact me with any questions, problems or concerns in the interim.  We will continue with longitudinal follow-up for management of sleep disordered breathing.    Darius Ansari MD  Sleep Medicine and Internal Medicine  [facility]  07/17/25      Portions of the record may have been created with voice recognition software.  Occasional wrong word or \"sound a like\" substitutions may have occurred due to the inherent limitations of voice recognition software.  Read the chart carefully and recognize, using context, where substitutions have occurred.            [1]   Allergies  Allergen Reactions    Other      Reaction Date: 25Jan2005; Annotation - 04Sep2012: rash...madlpn adhesive tape    Penicillins Rash     Reaction Date: 16Feb2004; Annotation - 04Sep2012: dayron     "

## 2025-07-17 NOTE — ASSESSMENT & PLAN NOTE
With reported history of type 2 diabetes. With most recent A1C of   Lab Results   Component Value Date    HGBA1C 5.5 01/17/2025   . There is an association between sleep and glucose control and insulin sensitivity.  I have encouraged the patient to aim for at least 7 to 9 hours of sleep nightly for overall health and wellness.  For management of sleep disordered breathing as above.     Lab Results   Component Value Date    HGBA1C 5.5 01/17/2025

## 2025-07-17 NOTE — PATIENT INSTRUCTIONS
"- Will start with conservative therapy-side sleeping in combination with weight loss.  - Showed the patient 3 devices to buy to help sleep on their side:   https://[employer],  https://www.[employer], https://www.[employer].Remed[employer].   - Recommended that she sew 3 pockets on the back of a shirt (one in the middle and one on each shoulder blade) and fill them with socks/washcloths. Another option is to get a oralia pack and wear it backwards (put socks or washcloths in it).      Patient Education     Sleep apnea in adults   The Basics   Written by the doctors and editors at [facility]   What is sleep apnea? -- Sleep apnea is a condition that makes you stop breathing for short periods while you are asleep. There are 2 types of sleep apnea. One is called \"obstructive sleep apnea.\" The other is called \"central sleep apnea.\"  In obstructive sleep apnea, you stop breathing because your throat narrows or closes (figure 1). In central sleep apnea, you stop breathing because your brain does not send the right signals to your muscles to make you breathe. When people talk about sleep apnea, they are usually referring to obstructive sleep apnea, which is what this article is about.  People with sleep apnea do not know that they stop breathing when they are asleep. But they do sometimes wake up startled or gasping for breath. They also often hear from loved ones that they snore.  What are the symptoms of sleep apnea? -- The main symptoms of sleep apnea are loud snoring, tiredness, and daytime sleepiness. Other symptoms can include:   Restless sleep   Waking up choking or gasping   Morning headaches, dry mouth, or sore throat   Waking up often to urinate   Waking up feeling unrested or groggy   Trouble thinking clearly or remembering things  Some people with sleep apnea don't have symptoms, or don't realize that they have them.  Should I see a doctor or nurse? -- Yes. If you think that you might have sleep apnea, see your doctor.  Is " "there a test for sleep apnea? -- Yes. First, your doctor or nurse will ask about your symptoms. If you have a bed partner, they might also ask that person if you snore or gasp in your sleep. If the doctor or nurse suspects that you have sleep apnea, they might send you for a \"sleep study.\"  Sleep studies can sometimes be done at home, but they are usually done in a sleep lab. For the study, you spend the night in the lab, and you are hooked up to different machines that monitor your heart rate, breathing, and other body functions. The results of the test tell your doctor or nurse if you have the disorder.  Is there anything I can do on my own to help my sleep apnea? -- Yes. Some things that might help:   Try to avoid sleeping on your back, if possible. This might help some people.   Lose weight, if you have excess body weight.   Get regular physical activity. This might help you lose weight. But even if it doesn't, being active is good for your health.   Avoid alcohol, especially in the evening. Alcohol can make sleep apnea worse.  How is sleep apnea treated? -- Treatment can include:   Weight loss - As mentioned above, weight loss can help if you have excess weight or obesity. But losing weight can be challenging, and it takes time to lose enough weight to help with your sleep apnea. Most people need other treatment while they work on losing weight.   CPAP - The most effective treatment for sleep apnea is a device that keeps your airway open while you sleep. Treatment with this device is called \"continuous positive airway pressure\" (\"CPAP\"). People getting CPAP wear a face mask at night that keeps them breathing (figure 2).  If your doctor or nurse recommends a CPAP machine, be patient about using it. The mask might seem uncomfortable to wear at first, and the machine might seem noisy, but using the machine can really help you. People with sleep apnea who use a CPAP machine feel more rested and generally feel " "better.  If CPAP does not work, your doctor might suggest other treatment. Options might include:   An oral device - This is a device that you wear in your mouth. It is called an \"oral appliance\" or \"mandibular advancement device.\" It helps keep your airway open while you sleep.   Hypoglossal nerve stimulation - This involves a procedure to implant a small device into your chest. The device has a wire that connects to the nerve under your tongue. While you are sleeping, it sends an electrical signal that causes the tongue to push forward. This helps open up your airway.   Surgery to widen your airway - This might involve removing your tonsils or other tissue that blocks the airway.  Is sleep apnea dangerous? -- It can be. Risks include:   Accidents - People with sleep apnea do not get good-quality sleep, so they are often tired and not alert. This puts them at risk for car accidents and other types of accidents.   Other health problems - Studies show that people with sleep apnea are more likely than others to have high blood pressure, heart attacks, and other serious heart problems. Some people also have mood changes or depression.  In people with severe sleep apnea, getting treated (for example, with CPAP) can help lower these risks.  All topics are updated as new evidence becomes available and our peer review process is complete.  This topic retrieved from [employer] on: Feb 28, 2024.  Topic 97002 Version 18.0  Release: 32.2.4 - C32.58  © 2024 UpToDate, Inc. and/or its affiliates. All rights reserved.  figure 1: Airway in a person with sleep apnea     Normally, when a person sleeps, the airway remains open, and air can pass from the nose and mouth to the lungs. In a person with sleep apnea, parts of the throat and mouth drop into the airway and block off the flow of air. This can cause loud snoring and interrupt breathing for short periods.  Graphic 22909 Version 6.0  figure 2: Continuous positive airway pressure " (CPAP) for sleep apnea     The CPAP mask gently blows air into your nose while you sleep. It puts just enough pressure on your airway to keep it from closing. The mask in this picture fits over just the nose. Other CPAP devices have masks that fit over the nose and mouth.  Graphic 96781 Version 5.0  Consumer Information Use and Disclaimer   Disclaimer: This generalized information is a limited summary of diagnosis, treatment, and/or medication information. It is not meant to be comprehensive and should be used as a tool to help the user understand and/or assess potential diagnostic and treatment options. It does NOT include all information about conditions, treatments, medications, side effects, or risks that may apply to a specific patient. It is not intended to be medical advice or a substitute for the medical advice, diagnosis, or treatment of a health care provider based on the health care provider's examination and assessment of a patient's specific and unique circumstances. Patients must speak with a health care provider for complete information about their health, medical questions, and treatment options, including any risks or benefits regarding use of medications. This information does not endorse any treatments or medications as safe, effective, or approved for treating a specific patient. UpToDate, Inc. and its affiliates disclaim any warranty or liability relating to this information or the use thereof.The use of this information is governed by the Terms of Use, available at https://www.woltersSensbeatuwer.com/en/know/clinical-effectiveness-terms. 2024© UpToDate, Inc. and its affiliates and/or licensors. All rights reserved.  Copyright   © 2024 UpToDate, Inc. and/or its affiliates. All rights reserved.

## 2025-07-17 NOTE — ASSESSMENT & PLAN NOTE
Lab Results   Component Value Date    HGBA1C 5.4 07/17/2025       Orders:  •  POCT hemoglobin A1c  •  semaglutide, 1 mg/dose, (Ozempic) 4 mg/3 mL injection pen; Inject 0.75 mL (1 mg total) under the skin once a week  •  CBC and differential; Future  •  Comprehensive metabolic panel; Future  •  Albumin / creatinine urine ratio; Future  •  Hemoglobin A1C; Future

## 2025-07-18 ENCOUNTER — TELEPHONE (OUTPATIENT)
Dept: ADMINISTRATIVE | Facility: OTHER | Age: 70
End: 2025-07-18

## 2025-07-18 NOTE — TELEPHONE ENCOUNTER
----- Message from Carmina Reyes MD sent at 7/17/2025  1:19 PM EDT -----  Regarding: care gap request  07/17/25 1:19 PM    Hello, our patient attached above has had Diabetic Eye Exam completed/performed. Please assist in updating the patient chart by making an External outreach  [facility], in [place] [address]lee     JARED COLINDRES

## 2025-07-18 NOTE — TELEPHONE ENCOUNTER
----- Message from Carmina Reyes MD sent at 7/17/2025  1:19 PM EDT -----  Regarding: care gap request  07/17/25 1:19 PM    Hello, our patient attached above has had Diabetic Eye Exam completed/performed. Please assist in updating the patient chart by making an External outreach  [facility], in [place] [address]lee     JARED COLINDRES

## 2025-08-08 ENCOUNTER — HOSPITAL ENCOUNTER (OUTPATIENT)
Dept: RADIOLOGY | Facility: HOSPITAL | Age: 70
Discharge: HOME | End: 2025-08-08
Attending: FAMILY MEDICINE
Payer: COMMERCIAL

## 2025-08-08 VITALS — BODY MASS INDEX: 36.63 KG/M2 | HEIGHT: 61 IN | WEIGHT: 194 LBS

## 2025-08-08 DIAGNOSIS — Z12.31 ENCOUNTER FOR SCREENING MAMMOGRAM FOR MALIGNANT NEOPLASM OF BREAST: ICD-10-CM

## 2025-08-08 PROCEDURE — 77063 BREAST TOMOSYNTHESIS BI: CPT

## 2025-08-08 PROCEDURE — 77067 SCR MAMMO BI INCL CAD: CPT

## 2025-08-19 DIAGNOSIS — E78.5 HYPERLIPIDEMIA, UNSPECIFIED HYPERLIPIDEMIA TYPE: ICD-10-CM

## 2025-08-20 RX ORDER — ATORVASTATIN CALCIUM 20 MG/1
20 TABLET, FILM COATED ORAL
Qty: 90 TABLET | Refills: 1 | Status: SHIPPED | OUTPATIENT
Start: 2025-08-20